# Patient Record
Sex: FEMALE | Race: WHITE | Employment: PART TIME | URBAN - METROPOLITAN AREA
[De-identification: names, ages, dates, MRNs, and addresses within clinical notes are randomized per-mention and may not be internally consistent; named-entity substitution may affect disease eponyms.]

---

## 2019-06-20 ENCOUNTER — HOSPITAL ENCOUNTER (OUTPATIENT)
Age: 39
Discharge: HOME | End: 2019-06-22
Payer: COMMERCIAL

## 2019-06-20 LAB
ALBUMIN SERPL-MCNC: 4.6 G/DL (ref 3.5–5.2)
ALP BLD-CCNC: 62 U/L (ref 35–104)
ALT SERPL-CCNC: 15 U/L (ref 0–32)
ANION GAP SERPL CALCULATED.3IONS-SCNC: 17 MMOL/L (ref 7–16)
AST SERPL-CCNC: 18 U/L (ref 0–31)
BILIRUB SERPL-MCNC: 0.3 MG/DL (ref 0–1.2)
BUN BLDV-MCNC: 8 MG/DL (ref 6–20)
CALCIUM SERPL-MCNC: 9.6 MG/DL (ref 8.6–10.2)
CHLORIDE BLD-SCNC: 102 MMOL/L (ref 98–107)
CO2: 21 MMOL/L (ref 22–29)
CREAT SERPL-MCNC: 0.8 MG/DL (ref 0.5–1)
GFR AFRICAN AMERICAN: >60
GFR NON-AFRICAN AMERICAN: >60 ML/MIN/1.73
GLUCOSE BLD-MCNC: 121 MG/DL (ref 74–99)
POTASSIUM SERPL-SCNC: 3.6 MMOL/L (ref 3.5–5)
SODIUM BLD-SCNC: 140 MMOL/L (ref 132–146)
T4 FREE: 1.29 NG/DL (ref 0.93–1.7)
TOTAL PROTEIN: 7.9 G/DL (ref 6.4–8.3)
TSH SERPL DL<=0.05 MIU/L-ACNC: 1.52 UIU/ML (ref 0.27–4.2)

## 2019-06-20 PROCEDURE — 84439 ASSAY OF FREE THYROXINE: CPT

## 2019-06-20 PROCEDURE — 80053 COMPREHEN METABOLIC PANEL: CPT

## 2019-06-20 PROCEDURE — 84443 ASSAY THYROID STIM HORMONE: CPT

## 2019-06-20 PROCEDURE — 36415 COLL VENOUS BLD VENIPUNCTURE: CPT

## 2019-07-23 ENCOUNTER — OFFICE VISIT (OUTPATIENT)
Dept: FAMILY MEDICINE CLINIC | Age: 39
End: 2019-07-23
Payer: COMMERCIAL

## 2019-07-23 VITALS
OXYGEN SATURATION: 94 % | HEIGHT: 63 IN | BODY MASS INDEX: 31.18 KG/M2 | SYSTOLIC BLOOD PRESSURE: 120 MMHG | TEMPERATURE: 97.9 F | DIASTOLIC BLOOD PRESSURE: 88 MMHG | HEART RATE: 81 BPM | WEIGHT: 176 LBS

## 2019-07-23 DIAGNOSIS — B02.29 POST HERPETIC NEURALGIA: ICD-10-CM

## 2019-07-23 DIAGNOSIS — K22.70 BARRETT'S ESOPHAGUS WITHOUT DYSPLASIA: ICD-10-CM

## 2019-07-23 DIAGNOSIS — E78.2 MIXED HYPERLIPIDEMIA: ICD-10-CM

## 2019-07-23 DIAGNOSIS — E55.9 VITAMIN D DEFICIENCY: ICD-10-CM

## 2019-07-23 DIAGNOSIS — Z79.899 LONG TERM USE OF DRUG: ICD-10-CM

## 2019-07-23 DIAGNOSIS — K22.4 ESOPHAGEAL SPASM: ICD-10-CM

## 2019-07-23 DIAGNOSIS — J30.9 CHRONIC ALLERGIC RHINITIS: ICD-10-CM

## 2019-07-23 DIAGNOSIS — F33.1 MODERATE EPISODE OF RECURRENT MAJOR DEPRESSIVE DISORDER (HCC): ICD-10-CM

## 2019-07-23 DIAGNOSIS — K52.9 COLITIS: ICD-10-CM

## 2019-07-23 DIAGNOSIS — K21.9 GASTROESOPHAGEAL REFLUX DISEASE WITHOUT ESOPHAGITIS: ICD-10-CM

## 2019-07-23 DIAGNOSIS — M15.9 PRIMARY OSTEOARTHRITIS INVOLVING MULTIPLE JOINTS: ICD-10-CM

## 2019-07-23 DIAGNOSIS — E05.90 HYPERTHYROIDISM: ICD-10-CM

## 2019-07-23 DIAGNOSIS — R73.01 IMPAIRED FASTING GLUCOSE: ICD-10-CM

## 2019-07-23 DIAGNOSIS — F41.1 GENERALIZED ANXIETY DISORDER: ICD-10-CM

## 2019-07-23 PROBLEM — M15.0 PRIMARY OSTEOARTHRITIS INVOLVING MULTIPLE JOINTS: Status: ACTIVE | Noted: 2019-07-23

## 2019-07-23 PROCEDURE — 99214 OFFICE O/P EST MOD 30 MIN: CPT | Performed by: INTERNAL MEDICINE

## 2019-07-23 RX ORDER — ATENOLOL 25 MG/1
50 TABLET ORAL 2 TIMES DAILY
Refills: 0 | COMMUNITY
Start: 2019-07-15 | End: 2021-12-13 | Stop reason: SDUPTHER

## 2019-07-23 RX ORDER — VALACYCLOVIR HYDROCHLORIDE 1 G/1
TABLET, FILM COATED ORAL
Refills: 0 | COMMUNITY
Start: 2019-06-24 | End: 2019-07-23 | Stop reason: SDUPTHER

## 2019-07-23 RX ORDER — FLUTICASONE PROPIONATE 50 MCG
2 SPRAY, SUSPENSION (ML) NASAL DAILY
Qty: 1 BOTTLE | Refills: 5 | Status: SHIPPED
Start: 2019-07-23 | End: 2020-02-25 | Stop reason: SDUPTHER

## 2019-07-23 RX ORDER — ELUXADOLINE 100 MG/1
1 TABLET, FILM COATED ORAL 2 TIMES DAILY
Refills: 0 | COMMUNITY
Start: 2019-07-01

## 2019-07-23 RX ORDER — LEVONORGESTREL/ETHINYL ESTRADIOL AND ETHINYL ESTRADIOL 100-20(84)
KIT ORAL
Refills: 0 | COMMUNITY
Start: 2019-05-04

## 2019-07-23 RX ORDER — CYCLOBENZAPRINE HCL 5 MG
TABLET ORAL
COMMUNITY
Start: 2011-12-21 | End: 2020-02-25 | Stop reason: SDUPTHER

## 2019-07-23 RX ORDER — ALPRAZOLAM 1 MG/1
1 TABLET ORAL 2 TIMES DAILY
COMMUNITY
Start: 2014-12-01 | End: 2019-07-23 | Stop reason: SDUPTHER

## 2019-07-23 RX ORDER — GABAPENTIN 600 MG/1
TABLET ORAL
Refills: 0 | COMMUNITY
Start: 2019-06-26 | End: 2019-07-23 | Stop reason: SDUPTHER

## 2019-07-23 RX ORDER — OMEPRAZOLE 40 MG/1
CAPSULE, DELAYED RELEASE ORAL
COMMUNITY
End: 2019-07-23 | Stop reason: SDUPTHER

## 2019-07-23 RX ORDER — MONTELUKAST SODIUM 10 MG/1
TABLET ORAL
Refills: 0 | COMMUNITY
Start: 2019-07-01 | End: 2019-07-23 | Stop reason: SDUPTHER

## 2019-07-23 RX ORDER — FLUTICASONE PROPIONATE 50 MCG
2 SPRAY, SUSPENSION (ML) NASAL DAILY
COMMUNITY
Start: 2019-05-16 | End: 2019-07-23 | Stop reason: SDUPTHER

## 2019-07-23 RX ORDER — DULOXETIN HYDROCHLORIDE 60 MG/1
60 CAPSULE, DELAYED RELEASE ORAL DAILY
Qty: 30 CAPSULE | Refills: 5 | Status: SHIPPED
Start: 2019-07-23 | End: 2020-02-12 | Stop reason: SDUPTHER

## 2019-07-23 RX ORDER — VALACYCLOVIR HYDROCHLORIDE 1 G/1
1000 TABLET, FILM COATED ORAL DAILY
Qty: 30 TABLET | Refills: 5 | Status: SHIPPED | OUTPATIENT
Start: 2019-07-23 | End: 2020-02-03 | Stop reason: SDUPTHER

## 2019-07-23 RX ORDER — DULOXETIN HYDROCHLORIDE 60 MG/1
CAPSULE, DELAYED RELEASE ORAL
COMMUNITY
Start: 2018-07-16 | End: 2019-07-23 | Stop reason: SDUPTHER

## 2019-07-23 RX ORDER — MONTELUKAST SODIUM 10 MG/1
10 TABLET ORAL NIGHTLY
Qty: 30 TABLET | Refills: 5 | Status: SHIPPED | OUTPATIENT
Start: 2019-07-23 | End: 2020-02-03 | Stop reason: SDUPTHER

## 2019-07-23 RX ORDER — OMEPRAZOLE 40 MG/1
40 CAPSULE, DELAYED RELEASE ORAL DAILY
Qty: 30 CAPSULE | Refills: 5 | Status: SHIPPED
Start: 2019-07-23 | End: 2020-02-25 | Stop reason: SDUPTHER

## 2019-07-23 RX ORDER — ALPRAZOLAM 1 MG/1
1 TABLET ORAL 2 TIMES DAILY
Qty: 60 TABLET | Refills: 1 | Status: SHIPPED | OUTPATIENT
Start: 2019-07-23 | End: 2019-10-21 | Stop reason: SDUPTHER

## 2019-07-23 RX ORDER — PROMETHAZINE HYDROCHLORIDE 25 MG/1
TABLET ORAL
Refills: 0 | COMMUNITY
Start: 2019-07-08 | End: 2020-07-06 | Stop reason: SDUPTHER

## 2019-07-23 RX ORDER — GABAPENTIN 600 MG/1
TABLET ORAL
Qty: 75 TABLET | Refills: 2 | Status: SHIPPED | OUTPATIENT
Start: 2019-07-23 | End: 2019-10-21 | Stop reason: SDUPTHER

## 2019-07-23 ASSESSMENT — PATIENT HEALTH QUESTIONNAIRE - PHQ9
1. LITTLE INTEREST OR PLEASURE IN DOING THINGS: 0
SUM OF ALL RESPONSES TO PHQ QUESTIONS 1-9: 0
2. FEELING DOWN, DEPRESSED OR HOPELESS: 0
SUM OF ALL RESPONSES TO PHQ QUESTIONS 1-9: 0
SUM OF ALL RESPONSES TO PHQ9 QUESTIONS 1 & 2: 0

## 2019-07-23 ASSESSMENT — ENCOUNTER SYMPTOMS
EYE PAIN: 0
CONSTIPATION: 0
BLOOD IN STOOL: 0
VOMITING: 0
ABDOMINAL PAIN: 0
SORE THROAT: 0
SHORTNESS OF BREATH: 0
DIARRHEA: 0
NAUSEA: 0
CHEST TIGHTNESS: 0
COUGH: 0
RHINORRHEA: 0

## 2019-07-23 NOTE — PROGRESS NOTES
improved with xanax and duloxetine    - States had childhood asthma. States singular has helped. States loratadine and ProAir as needed. States no wheezing or SOB. - States has seen gyn and placed on OCP for irregular menstrual cycles    Health Maintenance   - immunizations:   Influenza Vaccination  - declines  Pneumonia Vaccination  Zoster/Shingles Vaccine  Tetanus Vaccination - (2011)     - Screenings:   Bone Density Scan - (2/8/2017)  Pelvic/Pap Exam - (2017) - gyn  Mammogram     Colonoscopy - (8/2016) - polyp, hyperplastic, internal hemorrhoids, (1/2019) - tortuous colon, colon spasm, diverticulosis, No UC seen    EGD - (8/2016) - Sosa's esophagus, gastric polyps, (1/2019), Mild to mod chronic active gastritis - esophageal spasm    ROS:  Review of Systems   Constitutional: Negative for appetite change, chills, fever and unexpected weight change. HENT: Negative for congestion, rhinorrhea and sore throat. Eyes: Negative for pain and visual disturbance. Respiratory: Negative for cough, chest tightness and shortness of breath. Cardiovascular: Negative for chest pain and palpitations. Gastrointestinal: Negative for abdominal pain, blood in stool, constipation, diarrhea, nausea and vomiting. Genitourinary: Negative for difficulty urinating, dysuria, frequency, pelvic pain, urgency and vaginal bleeding. Musculoskeletal: Negative for arthralgias and myalgias. Skin: Negative for rash. Neurological: Negative for dizziness, syncope, weakness, light-headedness, numbness and headaches. Hematological: Negative for adenopathy. Psychiatric/Behavioral: Negative for suicidal ideas. The patient is not nervous/anxious.           Current Outpatient Medications:     cyclobenzaprine (FLEXERIL) 5 MG tablet, cyclobenzaprine 5 mg tablet  TAKE 1 TABLET BY MOUTH EVERY NIGHT AT BEDTIME TO TWICE DAILY AS NEEDED, Disp: , Rfl:     omeprazole (PRILOSEC) 40 MG delayed release capsule, Take 1 capsule by mouth daily, Inability: Not on file    Transportation needs:     Medical: Not on file     Non-medical: Not on file   Tobacco Use    Smoking status: Former Smoker     Packs/day: 0.50     Years: 1.50     Pack years: 0.75     Types: Cigarettes     Last attempt to quit: 1999     Years since quittin.0    Smokeless tobacco: Never Used   Substance and Sexual Activity    Alcohol use: Not on file    Drug use: Not on file    Sexual activity: Not on file   Lifestyle    Physical activity:     Days per week: Not on file     Minutes per session: Not on file    Stress: Not on file   Relationships    Social connections:     Talks on phone: Not on file     Gets together: Not on file     Attends Quaker service: Not on file     Active member of club or organization: Not on file     Attends meetings of clubs or organizations: Not on file     Relationship status: Not on file    Intimate partner violence:     Fear of current or ex partner: Not on file     Emotionally abused: Not on file     Physically abused: Not on file     Forced sexual activity: Not on file   Other Topics Concern    Not on file   Social History Narrative    Not on file       Vitals:    19 1305   BP: 120/88   Pulse: 81   Temp: 97.9 °F (36.6 °C)   SpO2: 94%   Weight: 176 lb (79.8 kg)   Height: 5' 3\" (1.6 m)       Exam:  Physical Exam   Constitutional: She is oriented to person, place, and time. She appears well-developed and well-nourished. HENT:   Head: Normocephalic and atraumatic. Right Ear: External ear normal.   Left Ear: External ear normal.   Mouth/Throat: Oropharynx is clear and moist.   Eyes: Pupils are equal, round, and reactive to light. EOM are normal.   Neck: Normal range of motion. Neck supple. No thyromegaly present. Cardiovascular: Normal rate, regular rhythm and normal heart sounds. No murmur heard. Pulmonary/Chest: Effort normal and breath sounds normal. She has no wheezes. Abdominal: Soft.  Bowel sounds are normal. She

## 2019-08-28 ENCOUNTER — HOSPITAL ENCOUNTER (OUTPATIENT)
Age: 39
Discharge: HOME | End: 2019-08-30
Payer: COMMERCIAL

## 2019-08-28 DIAGNOSIS — R73.01 IMPAIRED FASTING GLUCOSE: ICD-10-CM

## 2019-08-28 DIAGNOSIS — E78.2 MIXED HYPERLIPIDEMIA: ICD-10-CM

## 2019-08-28 DIAGNOSIS — E55.9 VITAMIN D DEFICIENCY: ICD-10-CM

## 2019-08-28 LAB
ALBUMIN SERPL-MCNC: 4.2 G/DL (ref 3.5–5.2)
ALP BLD-CCNC: 63 U/L (ref 35–104)
ALT SERPL-CCNC: 16 U/L (ref 0–32)
ANION GAP SERPL CALCULATED.3IONS-SCNC: 14 MMOL/L (ref 7–16)
AST SERPL-CCNC: 35 U/L (ref 0–31)
BACTERIA: ABNORMAL /HPF
BASOPHILS ABSOLUTE: 0.05 E9/L (ref 0–0.2)
BASOPHILS RELATIVE PERCENT: 0.4 % (ref 0–2)
BILIRUB SERPL-MCNC: 0.4 MG/DL (ref 0–1.2)
BILIRUBIN URINE: NEGATIVE
BLOOD, URINE: ABNORMAL
BUN BLDV-MCNC: 10 MG/DL (ref 6–20)
CALCIUM SERPL-MCNC: 9.2 MG/DL (ref 8.6–10.2)
CHLORIDE BLD-SCNC: 102 MMOL/L (ref 98–107)
CHOLESTEROL, FASTING: 221 MG/DL (ref 0–199)
CLARITY: CLEAR
CO2: 24 MMOL/L (ref 22–29)
COLOR: YELLOW
CREAT SERPL-MCNC: 0.8 MG/DL (ref 0.5–1)
EOSINOPHILS ABSOLUTE: 0.19 E9/L (ref 0.05–0.5)
EOSINOPHILS RELATIVE PERCENT: 1.6 % (ref 0–6)
EPITHELIAL CELLS, UA: ABNORMAL /HPF
GFR AFRICAN AMERICAN: >60
GFR NON-AFRICAN AMERICAN: >60 ML/MIN/1.73
GLUCOSE BLD-MCNC: 114 MG/DL (ref 74–99)
GLUCOSE URINE: NEGATIVE MG/DL
HBA1C MFR BLD: 5.2 % (ref 4–5.6)
HCT VFR BLD CALC: 42.4 % (ref 34–48)
HDLC SERPL-MCNC: 39 MG/DL
HEMOGLOBIN: 13.5 G/DL (ref 11.5–15.5)
IMMATURE GRANULOCYTES #: 0.05 E9/L
IMMATURE GRANULOCYTES %: 0.4 % (ref 0–5)
KETONES, URINE: NEGATIVE MG/DL
LDL CHOLESTEROL CALCULATED: 138 MG/DL (ref 0–99)
LEUKOCYTE ESTERASE, URINE: ABNORMAL
LYMPHOCYTES ABSOLUTE: 3.56 E9/L (ref 1.5–4)
LYMPHOCYTES RELATIVE PERCENT: 30.2 % (ref 20–42)
MAGNESIUM: 2.3 MG/DL (ref 1.6–2.6)
MCH RBC QN AUTO: 33.7 PG (ref 26–35)
MCHC RBC AUTO-ENTMCNC: 31.8 % (ref 32–34.5)
MCV RBC AUTO: 105.7 FL (ref 80–99.9)
MONOCYTES ABSOLUTE: 0.66 E9/L (ref 0.1–0.95)
MONOCYTES RELATIVE PERCENT: 5.6 % (ref 2–12)
NEUTROPHILS ABSOLUTE: 7.27 E9/L (ref 1.8–7.3)
NEUTROPHILS RELATIVE PERCENT: 61.8 % (ref 43–80)
NITRITE, URINE: NEGATIVE
PDW BLD-RTO: 12.2 FL (ref 11.5–15)
PH UA: 6 (ref 5–9)
PLATELET # BLD: 424 E9/L (ref 130–450)
PMV BLD AUTO: 10.5 FL (ref 7–12)
POTASSIUM SERPL-SCNC: 5 MMOL/L (ref 3.5–5)
PROTEIN UA: NEGATIVE MG/DL
RBC # BLD: 4.01 E12/L (ref 3.5–5.5)
RBC UA: ABNORMAL /HPF (ref 0–2)
SODIUM BLD-SCNC: 140 MMOL/L (ref 132–146)
SPECIFIC GRAVITY UA: <=1.005 (ref 1–1.03)
TOTAL PROTEIN: 7.5 G/DL (ref 6.4–8.3)
TRIGLYCERIDE, FASTING: 219 MG/DL (ref 0–149)
UROBILINOGEN, URINE: 0.2 E.U./DL
VITAMIN D 25-HYDROXY: 16 NG/ML (ref 30–100)
VLDLC SERPL CALC-MCNC: 44 MG/DL
WBC # BLD: 11.8 E9/L (ref 4.5–11.5)
WBC UA: ABNORMAL /HPF (ref 0–5)

## 2019-08-28 PROCEDURE — 83036 HEMOGLOBIN GLYCOSYLATED A1C: CPT

## 2019-08-28 PROCEDURE — 81001 URINALYSIS AUTO W/SCOPE: CPT

## 2019-08-28 PROCEDURE — 36415 COLL VENOUS BLD VENIPUNCTURE: CPT

## 2019-08-28 PROCEDURE — 85025 COMPLETE CBC W/AUTO DIFF WBC: CPT

## 2019-08-28 PROCEDURE — 83735 ASSAY OF MAGNESIUM: CPT

## 2019-08-28 PROCEDURE — 80061 LIPID PANEL: CPT

## 2019-08-28 PROCEDURE — 82306 VITAMIN D 25 HYDROXY: CPT

## 2019-08-28 PROCEDURE — 80053 COMPREHEN METABOLIC PANEL: CPT

## 2019-08-29 ENCOUNTER — TELEPHONE (OUTPATIENT)
Dept: PRIMARY CARE CLINIC | Age: 39
End: 2019-08-29

## 2019-08-29 NOTE — LETTER
[address] [place] [address]  Phone: [phone]  Fax: [phone]    Ashley Echevarria MD        September 3, 2019     [facility] [address]      Dear Evaristo Jacobs:    Below are the results from your recent visit:    Resulted Orders   Comprehensive Metabolic Panel   Result Value Ref Range    Sodium 140 132 - 146 mmol/L    Potassium 5.0 3.5 - 5.0 mmol/L      Comment:      Specimen is moderately Hemolyzed. Result may be artificially increased. Chloride 102 98 - 107 mmol/L    CO2 24 22 - 29 mmol/L    Anion Gap 14 7 - 16 mmol/L    Glucose 114 (H) 74 - 99 mg/dL    BUN 10 6 - 20 mg/dL    CREATININE 0.8 0.5 - 1.0 mg/dL    GFR Non-African American >60 >=60 mL/min/1.73      Comment:      Chronic Kidney Disease: less than 60 ml/min/1.73 sq.m. Kidney Failure: less than 15 ml/min/1.73 sq.m. Results valid for patients 18 years and older. GFR  >60     Calcium 9.2 8.6 - 10.2 mg/dL    Total Protein 7.5 6.4 - 8.3 g/dL    Alb 4.2 3.5 - 5.2 g/dL    Total Bilirubin 0.4 0.0 - 1.2 mg/dL    Alkaline Phosphatase 63 35 - 104 U/L    ALT 16 0 - 32 U/L    AST 35 (H) 0 - 31 U/L      Comment:      Specimen is moderately Hemolyzed. Result may be artificially increased. Lipid, Fasting   Result Value Ref Range    Cholesterol, Fasting 221 (H) 0 - 199 mg/dL    Triglyceride, Fasting 219 (H) 0 - 149 mg/dL    HDL 39 >40 mg/dL    LDL Calculated 138 (H) 0 - 99 mg/dL    VLDL Cholesterol Calculated 44 mg/dL   Magnesium   Result Value Ref Range    Magnesium 2.3 1.6 - 2.6 mg/dL   Hemoglobin A1C   Result Value Ref Range    Hemoglobin A1C 5.2 4.0 - 5.6 %   Vitamin D 25 Hydroxy   Result Value Ref Range    Vit D, 25-Hydroxy 16 (L) 30 - 100 ng/mL      Comment:      <20 ng/mL. ........... [facility] [place] Deficient  20-30 ng/mL. ......... [facility]   ng/mL. ........ [facility] [place] Sufficient  >100 ng/mL. .......... Edu[facility] [place] Toxic     Urinalysis   Result Value Ref Range    Color, UA Yellow Straw/Yellow

## 2019-09-03 DIAGNOSIS — R31.29 MICROHEMATURIA: ICD-10-CM

## 2019-09-03 DIAGNOSIS — Z79.899 LONG TERM USE OF DRUG: Primary | ICD-10-CM

## 2019-09-03 DIAGNOSIS — D75.89 MACROCYTOSIS WITHOUT ANEMIA: ICD-10-CM

## 2019-09-04 ENCOUNTER — HOSPITAL ENCOUNTER (OUTPATIENT)
Age: 39
Discharge: HOME | End: 2019-09-06
Payer: COMMERCIAL

## 2019-09-04 DIAGNOSIS — R31.29 MICROHEMATURIA: ICD-10-CM

## 2019-09-04 DIAGNOSIS — Z79.899 LONG TERM USE OF DRUG: ICD-10-CM

## 2019-09-04 DIAGNOSIS — D75.89 MACROCYTOSIS WITHOUT ANEMIA: ICD-10-CM

## 2019-09-04 LAB
BACTERIA: ABNORMAL /HPF
BILIRUBIN URINE: NEGATIVE
BLOOD, URINE: NORMAL
CLARITY: CLEAR
COLOR: YELLOW
EPITHELIAL CELLS, UA: ABNORMAL /HPF
FOLATE: 5.4 NG/ML (ref 4.8–24.2)
GLUCOSE URINE: NEGATIVE MG/DL
KETONES, URINE: NEGATIVE MG/DL
LEUKOCYTE ESTERASE, URINE: NEGATIVE
NITRITE, URINE: NEGATIVE
PH UA: 5.5 (ref 5–9)
PROTEIN UA: NEGATIVE MG/DL
RBC UA: ABNORMAL /HPF (ref 0–2)
SPECIFIC GRAVITY UA: 1.01 (ref 1–1.03)
UROBILINOGEN, URINE: 0.2 E.U./DL
VITAMIN B-12: 618 PG/ML (ref 211–946)
WBC UA: ABNORMAL /HPF (ref 0–5)

## 2019-09-04 PROCEDURE — 82607 VITAMIN B-12: CPT

## 2019-09-04 PROCEDURE — 36415 COLL VENOUS BLD VENIPUNCTURE: CPT

## 2019-09-04 PROCEDURE — 81001 URINALYSIS AUTO W/SCOPE: CPT

## 2019-09-04 PROCEDURE — 82746 ASSAY OF FOLIC ACID SERUM: CPT

## 2019-09-04 PROCEDURE — 87088 URINE BACTERIA CULTURE: CPT

## 2019-09-05 LAB
PATHOLOGIST REVIEW: NORMAL
URINE CULTURE, ROUTINE: NORMAL

## 2019-09-07 ENCOUNTER — TELEPHONE (OUTPATIENT)
Dept: FAMILY MEDICINE CLINIC | Age: 39
End: 2019-09-07

## 2019-09-09 RX ORDER — FOLIC ACID 1 MG/1
1 TABLET ORAL DAILY
COMMUNITY
End: 2019-10-21 | Stop reason: SDUPTHER

## 2019-09-10 DIAGNOSIS — N39.0 URINARY TRACT INFECTION WITHOUT HEMATURIA, SITE UNSPECIFIED: ICD-10-CM

## 2019-09-10 DIAGNOSIS — E53.8 FOLATE DEFICIENCY: ICD-10-CM

## 2019-09-10 DIAGNOSIS — E55.9 VITAMIN D DEFICIENCY: Primary | ICD-10-CM

## 2019-09-10 RX ORDER — SULFAMETHOXAZOLE AND TRIMETHOPRIM 800; 160 MG/1; MG/1
1 TABLET ORAL 2 TIMES DAILY
Qty: 10 TABLET | Refills: 0 | Status: SHIPPED | OUTPATIENT
Start: 2019-09-10 | End: 2019-09-15

## 2019-09-10 RX ORDER — FOLIC ACID 1 MG/1
1 TABLET ORAL DAILY
Qty: 30 TABLET | Refills: 5 | Status: SHIPPED
Start: 2019-09-10 | End: 2020-02-25 | Stop reason: SDUPTHER

## 2019-09-10 RX ORDER — ACETAMINOPHEN 160 MG
2000 TABLET,DISINTEGRATING ORAL DAILY
Qty: 30 CAPSULE | Refills: 5 | Status: SHIPPED | OUTPATIENT
Start: 2019-09-10 | End: 2019-12-20

## 2019-09-10 NOTE — TELEPHONE ENCOUNTER
Hannah Bonilla called back and asked if you will send a script to her pharmacy for the Vit D & Folic acid. If you write these as scripts, the insurance will cover them. Also, she is having sx of a UTI and would like you to send something in for that as well.

## 2019-09-12 RX ORDER — CYCLOBENZAPRINE HCL 5 MG
5 TABLET ORAL DAILY PRN
Qty: 30 TABLET | Refills: 5 | Status: SHIPPED | OUTPATIENT
Start: 2019-09-12 | End: 2019-10-12

## 2019-10-21 ENCOUNTER — HOSPITAL ENCOUNTER (OUTPATIENT)
Age: 39
Discharge: HOME | End: 2019-10-23
Payer: COMMERCIAL

## 2019-10-21 ENCOUNTER — OFFICE VISIT (OUTPATIENT)
Dept: FAMILY MEDICINE CLINIC | Age: 39
End: 2019-10-21
Payer: COMMERCIAL

## 2019-10-21 VITALS
TEMPERATURE: 98.1 F | SYSTOLIC BLOOD PRESSURE: 114 MMHG | OXYGEN SATURATION: 97 % | HEART RATE: 97 BPM | HEIGHT: 63 IN | DIASTOLIC BLOOD PRESSURE: 72 MMHG | WEIGHT: 185.38 LBS | BODY MASS INDEX: 32.85 KG/M2

## 2019-10-21 DIAGNOSIS — E55.9 VITAMIN D DEFICIENCY: ICD-10-CM

## 2019-10-21 DIAGNOSIS — K22.70 BARRETT'S ESOPHAGUS WITHOUT DYSPLASIA: ICD-10-CM

## 2019-10-21 DIAGNOSIS — J30.9 CHRONIC ALLERGIC RHINITIS: ICD-10-CM

## 2019-10-21 DIAGNOSIS — R73.01 IMPAIRED FASTING GLUCOSE: ICD-10-CM

## 2019-10-21 DIAGNOSIS — M15.9 PRIMARY OSTEOARTHRITIS INVOLVING MULTIPLE JOINTS: ICD-10-CM

## 2019-10-21 DIAGNOSIS — F33.1 MODERATE EPISODE OF RECURRENT MAJOR DEPRESSIVE DISORDER (HCC): ICD-10-CM

## 2019-10-21 DIAGNOSIS — K21.9 GASTROESOPHAGEAL REFLUX DISEASE WITHOUT ESOPHAGITIS: ICD-10-CM

## 2019-10-21 DIAGNOSIS — B02.29 POST HERPETIC NEURALGIA: ICD-10-CM

## 2019-10-21 DIAGNOSIS — F41.1 GENERALIZED ANXIETY DISORDER: ICD-10-CM

## 2019-10-21 DIAGNOSIS — E78.2 MIXED HYPERLIPIDEMIA: ICD-10-CM

## 2019-10-21 DIAGNOSIS — K22.4 ESOPHAGEAL SPASM: ICD-10-CM

## 2019-10-21 DIAGNOSIS — E05.90 HYPERTHYROIDISM: ICD-10-CM

## 2019-10-21 DIAGNOSIS — Z79.899 LONG TERM USE OF DRUG: Primary | ICD-10-CM

## 2019-10-21 DIAGNOSIS — K52.9 COLITIS: ICD-10-CM

## 2019-10-21 LAB
C-REACTIVE PROTEIN: 1.3 MG/DL (ref 0–0.4)
RHEUMATOID FACTOR: <10 IU/ML (ref 0–13)
URIC ACID, SERUM: 4.8 MG/DL (ref 2.4–5.7)

## 2019-10-21 PROCEDURE — 84550 ASSAY OF BLOOD/URIC ACID: CPT

## 2019-10-21 PROCEDURE — 86431 RHEUMATOID FACTOR QUANT: CPT

## 2019-10-21 PROCEDURE — G8484 FLU IMMUNIZE NO ADMIN: HCPCS | Performed by: INTERNAL MEDICINE

## 2019-10-21 PROCEDURE — 36415 COLL VENOUS BLD VENIPUNCTURE: CPT

## 2019-10-21 PROCEDURE — G8417 CALC BMI ABV UP PARAM F/U: HCPCS | Performed by: INTERNAL MEDICINE

## 2019-10-21 PROCEDURE — 86140 C-REACTIVE PROTEIN: CPT

## 2019-10-21 PROCEDURE — 1036F TOBACCO NON-USER: CPT | Performed by: INTERNAL MEDICINE

## 2019-10-21 PROCEDURE — G8427 DOCREV CUR MEDS BY ELIG CLIN: HCPCS | Performed by: INTERNAL MEDICINE

## 2019-10-21 PROCEDURE — 99214 OFFICE O/P EST MOD 30 MIN: CPT | Performed by: INTERNAL MEDICINE

## 2019-10-21 PROCEDURE — 85651 RBC SED RATE NONAUTOMATED: CPT

## 2019-10-21 PROCEDURE — 86200 CCP ANTIBODY: CPT

## 2019-10-21 RX ORDER — ALPRAZOLAM 1 MG/1
1 TABLET ORAL 2 TIMES DAILY
Qty: 60 TABLET | Refills: 2 | Status: SHIPPED | OUTPATIENT
Start: 2019-10-21 | End: 2020-09-16 | Stop reason: SDUPTHER

## 2019-10-21 RX ORDER — PERPHENAZINE AND AMITRIPTYLINE HYDROCHLORIDE 2; 10 MG/1; MG/1
1 TABLET, FILM COATED ORAL
COMMUNITY
End: 2020-09-16

## 2019-10-21 RX ORDER — GABAPENTIN 600 MG/1
TABLET ORAL
Qty: 75 TABLET | Refills: 2 | Status: SHIPPED | OUTPATIENT
Start: 2019-10-21 | End: 2020-02-25 | Stop reason: SDUPTHER

## 2019-10-21 ASSESSMENT — ENCOUNTER SYMPTOMS
VOMITING: 0
NAUSEA: 0
SHORTNESS OF BREATH: 0
CONSTIPATION: 0
RHINORRHEA: 0
DIARRHEA: 0
ABDOMINAL PAIN: 0
SORE THROAT: 0
COUGH: 0
CHEST TIGHTNESS: 0
EYE PAIN: 0
BLOOD IN STOOL: 0

## 2019-10-22 LAB — SEDIMENTATION RATE, ERYTHROCYTE: 17 MM/HR (ref 0–20)

## 2019-10-23 LAB — CCP IGG ANTIBODIES: 6 UNITS (ref 0–19)

## 2019-10-27 ENCOUNTER — TELEPHONE (OUTPATIENT)
Dept: FAMILY MEDICINE CLINIC | Age: 39
End: 2019-10-27

## 2019-11-18 RX ORDER — LORATADINE 10 MG/1
10 TABLET ORAL DAILY
Qty: 90 TABLET | Refills: 1 | Status: SHIPPED | OUTPATIENT
Start: 2019-11-18 | End: 2020-09-16 | Stop reason: SDUPTHER

## 2019-12-20 ENCOUNTER — OFFICE VISIT (OUTPATIENT)
Dept: FAMILY MEDICINE CLINIC | Age: 39
End: 2019-12-20
Payer: COMMERCIAL

## 2019-12-20 VITALS
HEIGHT: 63 IN | OXYGEN SATURATION: 98 % | TEMPERATURE: 98.3 F | DIASTOLIC BLOOD PRESSURE: 78 MMHG | BODY MASS INDEX: 33.73 KG/M2 | HEART RATE: 133 BPM | WEIGHT: 190.4 LBS | SYSTOLIC BLOOD PRESSURE: 138 MMHG

## 2019-12-20 DIAGNOSIS — K08.89 TOOTHACHE: Primary | ICD-10-CM

## 2019-12-20 PROCEDURE — 99213 OFFICE O/P EST LOW 20 MIN: CPT | Performed by: FAMILY MEDICINE

## 2019-12-20 PROCEDURE — G8484 FLU IMMUNIZE NO ADMIN: HCPCS | Performed by: FAMILY MEDICINE

## 2019-12-20 PROCEDURE — G8417 CALC BMI ABV UP PARAM F/U: HCPCS | Performed by: FAMILY MEDICINE

## 2019-12-20 PROCEDURE — G8427 DOCREV CUR MEDS BY ELIG CLIN: HCPCS | Performed by: FAMILY MEDICINE

## 2019-12-20 PROCEDURE — 1036F TOBACCO NON-USER: CPT | Performed by: FAMILY MEDICINE

## 2019-12-20 RX ORDER — AMOXICILLIN AND CLAVULANATE POTASSIUM 875; 125 MG/1; MG/1
1 TABLET, FILM COATED ORAL 2 TIMES DAILY
Qty: 14 TABLET | Refills: 0 | Status: SHIPPED | OUTPATIENT
Start: 2019-12-20 | End: 2019-12-27

## 2019-12-20 RX ORDER — AMITRIPTYLINE HYDROCHLORIDE 10 MG/1
10 TABLET, FILM COATED ORAL NIGHTLY
COMMUNITY
Start: 2019-12-12

## 2019-12-20 RX ORDER — CHOLECALCIFEROL (VITAMIN D3) 50 MCG
TABLET ORAL
Refills: 0 | COMMUNITY
Start: 2019-11-12 | End: 2020-09-16

## 2019-12-20 RX ORDER — HYDROCODONE BITARTRATE AND ACETAMINOPHEN 5; 325 MG/1; MG/1
1 TABLET ORAL EVERY 6 HOURS PRN
Qty: 12 TABLET | Refills: 0 | Status: SHIPPED | OUTPATIENT
Start: 2019-12-20 | End: 2019-12-23

## 2019-12-20 RX ORDER — ALBUTEROL SULFATE 90 UG/1
AEROSOL, METERED RESPIRATORY (INHALATION)
COMMUNITY
Start: 2019-12-10 | End: 2020-02-25 | Stop reason: SDUPTHER

## 2019-12-20 RX ORDER — PERPHENAZINE 2 MG/1
2 TABLET, FILM COATED ORAL DAILY
COMMUNITY
Start: 2019-12-12

## 2019-12-20 RX ORDER — 1.1% SODIUM FLUORIDE PRESCRIPTION DENTAL CREAM 5 MG/G
CREAM DENTAL
Refills: 0 | COMMUNITY
Start: 2019-10-19 | End: 2021-03-23 | Stop reason: ALTCHOICE

## 2020-01-14 ENCOUNTER — HOSPITAL ENCOUNTER (OUTPATIENT)
Age: 40
Discharge: HOME | End: 2020-01-16
Payer: COMMERCIAL

## 2020-01-14 LAB
ALBUMIN SERPL-MCNC: 4.3 G/DL (ref 3.5–5.2)
ALP BLD-CCNC: 62 U/L (ref 35–104)
ALT SERPL-CCNC: 13 U/L (ref 0–32)
ANION GAP SERPL CALCULATED.3IONS-SCNC: 19 MMOL/L (ref 7–16)
AST SERPL-CCNC: 17 U/L (ref 0–31)
BILIRUB SERPL-MCNC: 0.2 MG/DL (ref 0–1.2)
BUN BLDV-MCNC: 11 MG/DL (ref 6–20)
CALCIUM SERPL-MCNC: 8.9 MG/DL (ref 8.6–10.2)
CHLORIDE BLD-SCNC: 102 MMOL/L (ref 98–107)
CO2: 18 MMOL/L (ref 22–29)
CREAT SERPL-MCNC: 0.9 MG/DL (ref 0.5–1)
GFR AFRICAN AMERICAN: >60
GFR NON-AFRICAN AMERICAN: >60 ML/MIN/1.73
GLUCOSE BLD-MCNC: 163 MG/DL (ref 74–99)
POTASSIUM SERPL-SCNC: 3.8 MMOL/L (ref 3.5–5)
SODIUM BLD-SCNC: 139 MMOL/L (ref 132–146)
TOTAL PROTEIN: 7.3 G/DL (ref 6.4–8.3)
TSH SERPL DL<=0.05 MIU/L-ACNC: 2 UIU/ML (ref 0.27–4.2)

## 2020-01-14 PROCEDURE — 80053 COMPREHEN METABOLIC PANEL: CPT

## 2020-01-14 PROCEDURE — 36415 COLL VENOUS BLD VENIPUNCTURE: CPT

## 2020-01-14 PROCEDURE — 84443 ASSAY THYROID STIM HORMONE: CPT

## 2020-01-14 PROCEDURE — 84439 ASSAY OF FREE THYROXINE: CPT

## 2020-01-15 LAB — T4 FREE: 1.02 NG/DL (ref 0.93–1.7)

## 2020-02-03 RX ORDER — MONTELUKAST SODIUM 10 MG/1
10 TABLET ORAL NIGHTLY
Qty: 30 TABLET | Refills: 0 | Status: SHIPPED
Start: 2020-02-03 | End: 2020-02-25 | Stop reason: SDUPTHER

## 2020-02-03 RX ORDER — VALACYCLOVIR HYDROCHLORIDE 1 G/1
1000 TABLET, FILM COATED ORAL DAILY
Qty: 30 TABLET | Refills: 0 | Status: SHIPPED
Start: 2020-02-03 | End: 2020-02-25 | Stop reason: SDUPTHER

## 2020-02-03 NOTE — TELEPHONE ENCOUNTER
Name of Medication(s) Requested:  Singulair & Valtrex    Pharmacy Requested:   [facility]      Medication(s) pended? [x] Yes  [] No    Last Appointment:  10/21/2019    Future appts:  Future Appointments   Date Time Provider Jeanette Gia   2/25/2020  3:45 PM Akira Nation  [address]        Does patient need call back?   [] Yes  [x] No

## 2020-02-12 RX ORDER — DULOXETIN HYDROCHLORIDE 60 MG/1
60 CAPSULE, DELAYED RELEASE ORAL DAILY
Qty: 30 CAPSULE | Refills: 0 | Status: SHIPPED
Start: 2020-02-12 | End: 2020-02-25 | Stop reason: SDUPTHER

## 2020-02-25 ENCOUNTER — OFFICE VISIT (OUTPATIENT)
Dept: FAMILY MEDICINE CLINIC | Age: 40
End: 2020-02-25
Payer: COMMERCIAL

## 2020-02-25 VITALS
WEIGHT: 194 LBS | SYSTOLIC BLOOD PRESSURE: 116 MMHG | OXYGEN SATURATION: 98 % | BODY MASS INDEX: 34.37 KG/M2 | TEMPERATURE: 98.6 F | HEART RATE: 109 BPM | DIASTOLIC BLOOD PRESSURE: 76 MMHG

## 2020-02-25 PROBLEM — K60.2 ANAL FISSURE: Status: ACTIVE | Noted: 2020-02-25

## 2020-02-25 PROCEDURE — G8427 DOCREV CUR MEDS BY ELIG CLIN: HCPCS | Performed by: INTERNAL MEDICINE

## 2020-02-25 PROCEDURE — 96160 PT-FOCUSED HLTH RISK ASSMT: CPT | Performed by: INTERNAL MEDICINE

## 2020-02-25 PROCEDURE — G8484 FLU IMMUNIZE NO ADMIN: HCPCS | Performed by: INTERNAL MEDICINE

## 2020-02-25 PROCEDURE — G8417 CALC BMI ABV UP PARAM F/U: HCPCS | Performed by: INTERNAL MEDICINE

## 2020-02-25 PROCEDURE — 1036F TOBACCO NON-USER: CPT | Performed by: INTERNAL MEDICINE

## 2020-02-25 PROCEDURE — 99214 OFFICE O/P EST MOD 30 MIN: CPT | Performed by: INTERNAL MEDICINE

## 2020-02-25 RX ORDER — DULOXETIN HYDROCHLORIDE 60 MG/1
60 CAPSULE, DELAYED RELEASE ORAL DAILY
Qty: 30 CAPSULE | Refills: 2 | Status: SHIPPED
Start: 2020-02-25 | End: 2020-05-01 | Stop reason: SDUPTHER

## 2020-02-25 RX ORDER — MONTELUKAST SODIUM 10 MG/1
10 TABLET ORAL NIGHTLY
Qty: 30 TABLET | Refills: 2 | Status: SHIPPED
Start: 2020-02-25 | End: 2020-06-02 | Stop reason: SDUPTHER

## 2020-02-25 RX ORDER — FOLIC ACID 1 MG/1
1 TABLET ORAL DAILY
Qty: 30 TABLET | Refills: 5 | Status: SHIPPED
Start: 2020-02-25 | End: 2020-09-16

## 2020-02-25 RX ORDER — VALACYCLOVIR HYDROCHLORIDE 1 G/1
1000 TABLET, FILM COATED ORAL DAILY
Qty: 30 TABLET | Refills: 2 | Status: SHIPPED
Start: 2020-02-25 | End: 2020-05-01 | Stop reason: SDUPTHER

## 2020-02-25 RX ORDER — FLUTICASONE PROPIONATE 50 MCG
2 SPRAY, SUSPENSION (ML) NASAL DAILY
Qty: 1 BOTTLE | Refills: 5 | Status: SHIPPED
Start: 2020-02-25 | End: 2020-09-16 | Stop reason: SDUPTHER

## 2020-02-25 RX ORDER — ALBUTEROL SULFATE 90 UG/1
2 AEROSOL, METERED RESPIRATORY (INHALATION) EVERY 4 HOURS PRN
Qty: 1 INHALER | Refills: 3 | Status: SHIPPED
Start: 2020-02-25 | End: 2021-03-23 | Stop reason: SDUPTHER

## 2020-02-25 RX ORDER — OMEPRAZOLE 40 MG/1
40 CAPSULE, DELAYED RELEASE ORAL DAILY
Qty: 30 CAPSULE | Refills: 5 | Status: SHIPPED
Start: 2020-02-25 | End: 2020-08-31 | Stop reason: SDUPTHER

## 2020-02-25 RX ORDER — GABAPENTIN 600 MG/1
TABLET ORAL
Qty: 75 TABLET | Refills: 2 | Status: SHIPPED
Start: 2020-02-25 | End: 2020-05-01 | Stop reason: SDUPTHER

## 2020-02-25 RX ORDER — CYCLOBENZAPRINE HCL 5 MG
5 TABLET ORAL 3 TIMES DAILY PRN
Qty: 60 TABLET | Refills: 2 | Status: SHIPPED
Start: 2020-02-25 | End: 2020-07-15 | Stop reason: SDUPTHER

## 2020-02-25 RX ORDER — ALPRAZOLAM 1 MG/1
1 TABLET ORAL NIGHTLY PRN
Qty: 60 TABLET | Refills: 2 | Status: SHIPPED | OUTPATIENT
Start: 2020-02-25 | End: 2020-05-01

## 2020-02-25 ASSESSMENT — ENCOUNTER SYMPTOMS
VOMITING: 0
EYE PAIN: 0
DIARRHEA: 0
BLOOD IN STOOL: 0
NAUSEA: 0
RHINORRHEA: 0
CHEST TIGHTNESS: 0
SORE THROAT: 0
COUGH: 0
ABDOMINAL PAIN: 0
CONSTIPATION: 0
SHORTNESS OF BREATH: 0

## 2020-02-25 ASSESSMENT — PATIENT HEALTH QUESTIONNAIRE - PHQ9
3. TROUBLE FALLING OR STAYING ASLEEP: 0
5. POOR APPETITE OR OVEREATING: 0
1. LITTLE INTEREST OR PLEASURE IN DOING THINGS: 0
SUM OF ALL RESPONSES TO PHQ9 QUESTIONS 1 & 2: 0
8. MOVING OR SPEAKING SO SLOWLY THAT OTHER PEOPLE COULD HAVE NOTICED. OR THE OPPOSITE, BEING SO FIGETY OR RESTLESS THAT YOU HAVE BEEN MOVING AROUND A LOT MORE THAN USUAL: 0
SUM OF ALL RESPONSES TO PHQ QUESTIONS 1-9: 0
6. FEELING BAD ABOUT YOURSELF - OR THAT YOU ARE A FAILURE OR HAVE LET YOURSELF OR YOUR FAMILY DOWN: 0
7. TROUBLE CONCENTRATING ON THINGS, SUCH AS READING THE NEWSPAPER OR WATCHING TELEVISION: 0
10. IF YOU CHECKED OFF ANY PROBLEMS, HOW DIFFICULT HAVE THESE PROBLEMS MADE IT FOR YOU TO DO YOUR WORK, TAKE CARE OF THINGS AT HOME, OR GET ALONG WITH OTHER PEOPLE: 0
2. FEELING DOWN, DEPRESSED OR HOPELESS: 0
4. FEELING TIRED OR HAVING LITTLE ENERGY: 0
9. THOUGHTS THAT YOU WOULD BE BETTER OFF DEAD, OR OF HURTING YOURSELF: 0
SUM OF ALL RESPONSES TO PHQ QUESTIONS 1-9: 0

## 2020-02-25 NOTE — PROGRESS NOTES
[facility]) Physicians   Internal Medicine     2020  Evi Loco : 1980 Sex: female  Age:39 y.o. Chief Complaint   Patient presents with    Gastroesophageal Reflux     Sosa's    Hypothyroidism    Hyperlipidemia    Rectal Bleeding     w/ BM. Was seen at [facility] was told that she has anal fissuers. Rx for Nifedipine 0.2% ointment given.  Anxiety     PHQ9 and Med Contract signed today         HPI:   Patient presents to office for review and management of chronic medical conditions.    - States was seen at [facility]. States through colorectal surgery. States had anal fissure. States placed on compounded ointment - nifedipine 2-3 times per day. States still has pain. Bleeding is less. Using topical lidocaine. States also taking fiber.      - States GERD, states on omeprazole - had EGD and colonoscopy - Sosa esophagus on (2016), follow up 2019 - no Sosa - states esophageal spasm and gastritis. Now on perphen-amitriptyline. Stopped phenergan and isosorbide as needed. States suggested haldol or Marinol, - Marinol not covered. Has side effects with haldol, now off. To follow up. - States having severe IBS with diarrhea, possible ulcerative colitis. Stopped Lialda. No abdo pain. No current bowel urgency, bleeding. States bloating and gas. No fever or chills. States follows with GI. States on Viberzi twice a day- states has decreased to once a day. Last colonoscopy was 2019    - States anxiety is more controlled. On duloxetine and on xanax. States taking xanax daily and sometimes twice a day. States also helps with sleeping. Did not go to counseling. No Suicidal thoughts. No reported side effects with medications. States meds are helping symptoms.    - States shingles has become recurrent. Stable. States saw infectious disease and placed on valtrex daily for suppressive treatment. States taking gabapentin 600mg twice a day for post herpetic neuralgia.  Not currently the next 50mg (then repeat), Disp: , Rfl: 0    CAMRESE LO 0.1-0.02 & 0.01 MG TABS, , Disp: , Rfl: 0    promethazine (PHENERGAN) 25 MG tablet, 1-2 tabs q 4 hrs prn, Disp: , Rfl: 0    VIBERZI 100 MG TABS, 1 tablet 2 times daily. , Disp: , Rfl: 0    ALPRAZolam (XANAX) 1 MG tablet, Take 1 tablet by mouth 2 times daily for 90 days. , Disp: 60 tablet, Rfl: 2    perphenazine-amitriptyline (ETRAFON;TRIAVIL) 2-10 MG per tablet, Take 1 tablet by mouth 1=2 tabltes by mouth once a day   Green[place] Brady, Disp: , Rfl:     Allergies   Allergen Reactions    Latex     Ibuprofen Other (See Comments)     NSAIDs trigger colitis        Past Medical History:   Diagnosis Date    Asthma     Sosa's esophagus without dysplasia 7/23/2019    Chronic allergic rhinitis 7/23/2019    Colitis 7/23/2019    Depression     Esophageal spasm 7/23/2019    Gastroesophageal reflux disease without esophagitis 7/23/2019    Generalized anxiety disorder 7/23/2019    Hyperthyroidism 7/23/2019    Impaired fasting glucose 7/23/2019    Insomnia     Irritable bowel syndrome     Seasonal allergies     Ulcerative colitis ([facility] [address]        Past Surgical History:   Procedure Laterality Date    NASAL SEPTUM SURGERY      biopsy     WISDOM TOOTH EXTRACTION         Family History   Problem Relation Age of Onset    Osteoarthritis Mother     Elevated Lipids Mother     Heart Disease Father     Other Father         back pain     Elevated Lipids Father        Social History     Socioeconomic History    Marital status: Unknown     Spouse name: Not on file    Number of children: Not on file    Years of education: Not on file    Highest education level: Not on file   Occupational History    Not on file   Social Needs    Financial resource strain: Not on file    Food insecurity:     Worry: Not on file     Inability: Not on file    Transportation needs:     Medical: Not on file     Non-medical: Not on file   Tobacco Use    Smoking status: Former tab 2-3 times qd    montelukast (SINGULAIR) 10 MG tablet 30 tablet 2     Sig: Take 1 tablet by mouth nightly    omeprazole (PRILOSEC) 40 MG delayed release capsule 30 capsule 5     Sig: Take 1 capsule by mouth daily    valACYclovir (VALTREX) 1 g tablet 30 tablet 2     Sig: Take 1 tablet by mouth daily    albuterol sulfate  (90 Base) MCG/ACT inhaler 1 Inhaler 3     Sig: Inhale 2 puffs into the lungs every 4 hours as needed for Wheezing    cyclobenzaprine (FLEXERIL) 5 MG tablet 60 tablet 2     Sig: Take 1 tablet by mouth 3 times daily as needed for Muscle spasms    ALPRAZolam (XANAX) 1 MG tablet 60 tablet 2     Sig: Take 1 tablet by mouth nightly as needed for Anxiety for up to 30 days.        Solitario Stockton MD  2/25/2020  5:17 PM

## 2020-04-29 ENCOUNTER — PATIENT MESSAGE (OUTPATIENT)
Dept: FAMILY MEDICINE CLINIC | Age: 40
End: 2020-04-29

## 2020-05-01 ENCOUNTER — TELEMEDICINE (OUTPATIENT)
Dept: PRIMARY CARE CLINIC | Age: 40
End: 2020-05-01
Payer: COMMERCIAL

## 2020-05-01 PROCEDURE — 1036F TOBACCO NON-USER: CPT | Performed by: INTERNAL MEDICINE

## 2020-05-01 PROCEDURE — G8417 CALC BMI ABV UP PARAM F/U: HCPCS | Performed by: INTERNAL MEDICINE

## 2020-05-01 PROCEDURE — G8427 DOCREV CUR MEDS BY ELIG CLIN: HCPCS | Performed by: INTERNAL MEDICINE

## 2020-05-01 PROCEDURE — 99214 OFFICE O/P EST MOD 30 MIN: CPT | Performed by: INTERNAL MEDICINE

## 2020-05-01 RX ORDER — VALACYCLOVIR HYDROCHLORIDE 1 G/1
1000 TABLET, FILM COATED ORAL DAILY
Qty: 30 TABLET | Refills: 2 | Status: SHIPPED
Start: 2020-05-01 | End: 2020-09-14 | Stop reason: SDUPTHER

## 2020-05-01 RX ORDER — VALACYCLOVIR HYDROCHLORIDE 1 G/1
1000 TABLET, FILM COATED ORAL 3 TIMES DAILY
Qty: 21 TABLET | Refills: 0 | Status: SHIPPED | OUTPATIENT
Start: 2020-05-01 | End: 2020-05-08

## 2020-05-01 RX ORDER — GABAPENTIN 600 MG/1
TABLET ORAL
Qty: 75 TABLET | Refills: 2 | Status: SHIPPED
Start: 2020-05-01 | End: 2020-08-24 | Stop reason: SDUPTHER

## 2020-05-01 RX ORDER — DULOXETIN HYDROCHLORIDE 60 MG/1
60 CAPSULE, DELAYED RELEASE ORAL DAILY
Qty: 30 CAPSULE | Refills: 2 | Status: SHIPPED
Start: 2020-05-01 | End: 2020-09-14 | Stop reason: SDUPTHER

## 2020-05-01 ASSESSMENT — ENCOUNTER SYMPTOMS
ABDOMINAL PAIN: 0
CHEST TIGHTNESS: 0
COUGH: 0
EYE PAIN: 0
SHORTNESS OF BREATH: 0
VOMITING: 0
NAUSEA: 0
CONSTIPATION: 0
BLOOD IN STOOL: 0
DIARRHEA: 0
SORE THROAT: 0
RHINORRHEA: 0

## 2020-05-01 NOTE — PROGRESS NOTES
unexpected weight change. HENT: Negative for congestion, rhinorrhea and sore throat. Eyes: Negative for pain and visual disturbance. Respiratory: Negative for cough, chest tightness and shortness of breath. Cardiovascular: Negative for chest pain and palpitations. Gastrointestinal: Negative for abdominal pain, blood in stool, constipation, diarrhea, nausea and vomiting. Genitourinary: Negative for difficulty urinating, dysuria, frequency, pelvic pain, urgency and vaginal bleeding. Musculoskeletal: Negative for arthralgias and myalgias. Skin: Positive for rash. Neurological: Negative for dizziness, syncope, weakness, light-headedness, numbness and headaches. Hematological: Negative for adenopathy. Psychiatric/Behavioral: Negative for suicidal ideas. The patient is nervous/anxious. Prior to Visit Medications    Medication Sig Taking?  Authorizing Provider   gabapentin (NEURONTIN) 600 MG tablet 1 tab 2-3 times qd Yes Terri Craig MD   valACYclovir (VALTREX) 1 g tablet Take 1 tablet by mouth daily Yes Terri Craig MD   DULoxetine (CYMBALTA) 60 MG extended release capsule Take 1 capsule by mouth daily Yes Terri Craig MD   valACYclovir (VALTREX) 1 g tablet Take 1 tablet by mouth 3 times daily for 7 days Yes Terri Craig MD   NONFORMULARY Apply topically Nifedipine 0.2% ointment 2-3 times per day to annual fissure Yes Historical Provider, MD   fluticasone (FLONASE) 50 MCG/ACT nasal spray 2 sprays by Nasal route daily Yes Terri Craig MD   folic acid (FOLVITE) 1 MG tablet Take 1 tablet by mouth daily Yes Terri Craig MD   montelukast (SINGULAIR) 10 MG tablet Take 1 tablet by mouth nightly Yes Terri Craig MD   omeprazole (PRILOSEC) 40 MG delayed release capsule Take 1 capsule by mouth daily Yes Terri Craig MD   albuterol sulfate  (90 Base) MCG/ACT inhaler Inhale 2 puffs into the lungs every 4 hours as needed for Wheezing Yes Terri Craig MD Gastroesophageal reflux disease without esophagitis 2019    Generalized anxiety disorder 2019    Hyperthyroidism 2019    Impaired fasting glucose 2019    Insomnia     Irritable bowel syndrome     Seasonal allergies     Ulcerative colitis (HCC)    ,   Past Surgical History:   Procedure Laterality Date    NASAL SEPTUM SURGERY      biopsy     WISDOM TOOTH EXTRACTION     ,   Social History     Tobacco Use    Smoking status: Former Smoker     Packs/day: 0.50     Years: 1.50     Pack years: 0.75     Types: Cigarettes     Last attempt to quit: 1999     Years since quittin.7    Smokeless tobacco: Never Used   Substance Use Topics    Alcohol use: Yes     Comment: socially     Drug use: Not on file   ,   Immunization History   Administered Date(s) Administered    Tdap (Boostrix, Adacel) 10/03/2011   ,   Health Maintenance   Topic Date Due    HIV screen  1993    Cervical cancer screen  2013    A1C test (Diabetic or Prediabetic)  2020    Flu vaccine (Season Ended) 2020    TSH testing  2021    DTaP/Tdap/Td vaccine (2 - Td) 10/03/2021    Lipid screen  2024    Hepatitis A vaccine  Aged Out    Hepatitis B vaccine  Aged Out    Hib vaccine  Aged Out    Meningococcal (ACWY) vaccine  Aged Out    Pneumococcal 0-64 years Vaccine  Aged Out       PHYSICAL EXAMINATION:  [ INSTRUCTIONS:  \"[x]\" Indicates a positive item  \"[]\" Indicates a negative item  -- DELETE ALL ITEMS NOT EXAMINED]  Vital Signs: (As obtained by patient/caregiver or practitioner observation)    Blood pressure-  Heart rate-    Respiratory rate-    Temperature-  Pulse oximetry-     Constitutional: [x] Appears well-developed and well-nourished [] No apparent distress      [] Abnormal-   Mental status  [x] Alert and awake  [x] Oriented to person/place/time []Able to follow commands      Eyes:  EOM    [x]  Normal  [] Abnormal-  Sclera  []  Normal  [] Abnormal -         Discharge []  None treatment  - watch diet  - on omeprazole   - discussed long term use side effects  - EGD 2019, esophageal spasm and gastritis, no gayle  - recommended haldol by GI - did not tolerate side effects   - on perphen-amitriptyline  - stable      Colitis  - continue present treatment  - following up with GI  - uncertain etiology   - stopped lialda  - now stable  - last colonoscopy       Impaired fasting glucose  hyperglycemia  - Follow A1c      Mixed hyperlipidemia  - continue present treatment  - watch diet     Hyperthyroidism  - following with endocrinology  - off methimazole  - on atenolol on alternating doses  - last lab (2020) - normal.     Esophageal spasm  - EGD () - esophageal spasm and gastritis, no gayle  - recommended haldol by GI - did not tolerate   - concern with antipsychotic, long term use adverse reactions and other drug interactions and uncertain indication. - on perphen-amitriptyline  - unclear if calcium channel blocker is contraindicated  - Marinol not covered     Chronic allergic rhinitis  - continue present treatment  - on singulair     Vitamin D deficiency  - continue present treatment  - on vitamin D3 2000units daily   - follow labs      Primary osteoarthritis involving multiple joints  - uncertain etiology  - improved on duloxetine  - Stable      Long term use of drug    Return in about 3 months (around 2020) for check up and review. No orders of the defined types were placed in this encounter. Requested Prescriptions     Signed Prescriptions Disp Refills    gabapentin (NEURONTIN) 600 MG tablet 75 tablet 2     Si tab 2-3 times qd    valACYclovir (VALTREX) 1 g tablet 30 tablet 2     Sig: Take 1 tablet by mouth daily    DULoxetine (CYMBALTA) 60 MG extended release capsule 30 capsule 2     Sig: Take 1 capsule by mouth daily    valACYclovir (VALTREX) 1 g tablet 21 tablet 0     Sig: Take 1 tablet by mouth 3 times daily for 7 days       Cris Hoffman is a 37 y.o.

## 2020-06-02 RX ORDER — MONTELUKAST SODIUM 10 MG/1
10 TABLET ORAL NIGHTLY
Qty: 30 TABLET | Refills: 2 | Status: SHIPPED
Start: 2020-06-02 | End: 2020-08-31 | Stop reason: SDUPTHER

## 2020-07-06 RX ORDER — PROMETHAZINE HYDROCHLORIDE 25 MG/1
25 TABLET ORAL EVERY 8 HOURS PRN
Qty: 40 TABLET | Refills: 0 | Status: SHIPPED
Start: 2020-07-06 | End: 2020-09-16 | Stop reason: SDUPTHER

## 2020-07-15 RX ORDER — CYCLOBENZAPRINE HCL 5 MG
5 TABLET ORAL 3 TIMES DAILY PRN
Qty: 60 TABLET | Refills: 2 | Status: SHIPPED
Start: 2020-07-15 | End: 2020-12-14 | Stop reason: SDUPTHER

## 2020-08-24 RX ORDER — GABAPENTIN 600 MG/1
TABLET ORAL
Qty: 75 TABLET | Refills: 2 | Status: SHIPPED
Start: 2020-08-24 | End: 2020-11-04 | Stop reason: SDUPTHER

## 2020-08-28 ENCOUNTER — TELEPHONE (OUTPATIENT)
Dept: FAMILY MEDICINE CLINIC | Age: 40
End: 2020-08-28

## 2020-08-31 RX ORDER — MONTELUKAST SODIUM 10 MG/1
10 TABLET ORAL NIGHTLY
Qty: 30 TABLET | Refills: 2 | Status: SHIPPED
Start: 2020-08-31 | End: 2020-11-30 | Stop reason: SDUPTHER

## 2020-08-31 RX ORDER — OMEPRAZOLE 40 MG/1
40 CAPSULE, DELAYED RELEASE ORAL DAILY
Qty: 30 CAPSULE | Refills: 5 | Status: SHIPPED
Start: 2020-08-31 | End: 2021-03-01 | Stop reason: SDUPTHER

## 2020-09-14 RX ORDER — VALACYCLOVIR HYDROCHLORIDE 1 G/1
1000 TABLET, FILM COATED ORAL DAILY
Qty: 30 TABLET | Refills: 2 | Status: SHIPPED
Start: 2020-09-14 | End: 2020-12-07 | Stop reason: SDUPTHER

## 2020-09-14 RX ORDER — DULOXETIN HYDROCHLORIDE 60 MG/1
60 CAPSULE, DELAYED RELEASE ORAL DAILY
Qty: 30 CAPSULE | Refills: 2 | Status: SHIPPED
Start: 2020-09-14 | End: 2020-12-07 | Stop reason: SDUPTHER

## 2020-09-16 ENCOUNTER — OFFICE VISIT (OUTPATIENT)
Dept: FAMILY MEDICINE CLINIC | Age: 40
End: 2020-09-16
Payer: COMMERCIAL

## 2020-09-16 ENCOUNTER — HOSPITAL ENCOUNTER (OUTPATIENT)
Age: 40
Discharge: HOME | End: 2020-09-18
Payer: COMMERCIAL

## 2020-09-16 VITALS
DIASTOLIC BLOOD PRESSURE: 82 MMHG | SYSTOLIC BLOOD PRESSURE: 116 MMHG | TEMPERATURE: 97.8 F | WEIGHT: 205 LBS | HEIGHT: 63 IN | HEART RATE: 89 BPM | BODY MASS INDEX: 36.32 KG/M2 | OXYGEN SATURATION: 98 %

## 2020-09-16 LAB
ALBUMIN SERPL-MCNC: 4 G/DL (ref 3.5–5.2)
ALP BLD-CCNC: 62 U/L (ref 35–104)
ALT SERPL-CCNC: 13 U/L (ref 0–32)
ANION GAP SERPL CALCULATED.3IONS-SCNC: 13 MMOL/L (ref 7–16)
AST SERPL-CCNC: 17 U/L (ref 0–31)
BILIRUB SERPL-MCNC: 0.3 MG/DL (ref 0–1.2)
BUN BLDV-MCNC: 10 MG/DL (ref 6–20)
CALCIUM SERPL-MCNC: 8.9 MG/DL (ref 8.6–10.2)
CHLORIDE BLD-SCNC: 102 MMOL/L (ref 98–107)
CO2: 21 MMOL/L (ref 22–29)
CREAT SERPL-MCNC: 0.8 MG/DL (ref 0.5–1)
GFR AFRICAN AMERICAN: >60
GFR NON-AFRICAN AMERICAN: >60 ML/MIN/1.73
GLUCOSE BLD-MCNC: 118 MG/DL (ref 74–99)
HBA1C MFR BLD: 5.5 % (ref 4–5.6)
POTASSIUM SERPL-SCNC: 4.2 MMOL/L (ref 3.5–5)
SODIUM BLD-SCNC: 136 MMOL/L (ref 132–146)
T4 FREE: 1.15 NG/DL (ref 0.93–1.7)
TOTAL PROTEIN: 7.2 G/DL (ref 6.4–8.3)
TSH SERPL DL<=0.05 MIU/L-ACNC: 1.44 UIU/ML (ref 0.27–4.2)

## 2020-09-16 PROCEDURE — 84439 ASSAY OF FREE THYROXINE: CPT

## 2020-09-16 PROCEDURE — 84443 ASSAY THYROID STIM HORMONE: CPT

## 2020-09-16 PROCEDURE — 36415 COLL VENOUS BLD VENIPUNCTURE: CPT

## 2020-09-16 PROCEDURE — 83036 HEMOGLOBIN GLYCOSYLATED A1C: CPT

## 2020-09-16 PROCEDURE — 80053 COMPREHEN METABOLIC PANEL: CPT

## 2020-09-16 PROCEDURE — G8417 CALC BMI ABV UP PARAM F/U: HCPCS | Performed by: INTERNAL MEDICINE

## 2020-09-16 PROCEDURE — 1036F TOBACCO NON-USER: CPT | Performed by: INTERNAL MEDICINE

## 2020-09-16 PROCEDURE — 99214 OFFICE O/P EST MOD 30 MIN: CPT | Performed by: INTERNAL MEDICINE

## 2020-09-16 PROCEDURE — G8427 DOCREV CUR MEDS BY ELIG CLIN: HCPCS | Performed by: INTERNAL MEDICINE

## 2020-09-16 RX ORDER — PROMETHAZINE HYDROCHLORIDE 25 MG/1
25 TABLET ORAL EVERY 8 HOURS PRN
Qty: 40 TABLET | Refills: 1 | Status: SHIPPED
Start: 2020-09-16 | End: 2021-03-23 | Stop reason: SDUPTHER

## 2020-09-16 RX ORDER — FLUTICASONE PROPIONATE 50 MCG
2 SPRAY, SUSPENSION (ML) NASAL DAILY
Qty: 1 BOTTLE | Refills: 5 | Status: SHIPPED
Start: 2020-09-16 | End: 2021-03-23 | Stop reason: SDUPTHER

## 2020-09-16 RX ORDER — LORATADINE 10 MG/1
10 TABLET ORAL DAILY PRN
Qty: 30 TABLET | Refills: 2 | Status: SHIPPED
Start: 2020-09-16 | End: 2021-03-23 | Stop reason: SDUPTHER

## 2020-09-16 RX ORDER — ALPRAZOLAM 1 MG/1
1 TABLET ORAL 2 TIMES DAILY
Qty: 60 TABLET | Refills: 2 | Status: SHIPPED | OUTPATIENT
Start: 2020-09-16 | End: 2020-12-14 | Stop reason: SDUPTHER

## 2020-09-16 ASSESSMENT — ENCOUNTER SYMPTOMS
CHEST TIGHTNESS: 0
BLOOD IN STOOL: 0
DIARRHEA: 0
COUGH: 0
SHORTNESS OF BREATH: 0
SORE THROAT: 0
VOMITING: 0
RHINORRHEA: 0
ABDOMINAL PAIN: 0
CONSTIPATION: 0
EYE PAIN: 0
NAUSEA: 0

## 2020-09-16 NOTE — PROGRESS NOTES
following with ID.     - States allergy and sinus symptoms have improved with Flonase. Stable. States taking loratadine as needed. On Singulair    - States back pain is stable. States comes and goes. States gabapentin has helped. - States now seeing endocrinology for graves disease. States methimazole stopped. State on atenolol 50mg alternating with 25mg. Last TSH per patient was 2.0    - States insomnia has improved with xanax and duloxetine    - States had childhood asthma. States singular has helped. States loratadine and ProAir as needed. States no wheezing or SOB. - States has seen gyn and placed on OCP for irregular menstrual cycles    Health Maintenance   - immunizations:   Influenza Vaccination  - declines  Pneumonia Vaccination  Zoster/Shingles Vaccine  Tetanus Vaccination - (2011)     - Screenings:   Bone Density Scan - (2/8/2017)  Pelvic/Pap Exam - (2017), (2019) - gyn  Mammogram     Colonoscopy - (8/2016) - polyp, hyperplastic, internal hemorrhoids, (1/2019) - tortuous colon, colon spasm, diverticulosis, No UC seen    EGD - (8/2016) - Sosa's esophagus, gastric polyps, (1/2019), Mild to mod chronic active gastritis - esophageal spasm    ROS:  Review of Systems   Constitutional: Negative for appetite change, chills, fever and unexpected weight change. HENT: Negative for congestion, rhinorrhea and sore throat. Eyes: Negative for pain and visual disturbance. Respiratory: Negative for cough, chest tightness and shortness of breath. Cardiovascular: Negative for chest pain and palpitations. Gastrointestinal: Negative for abdominal pain, blood in stool, constipation, diarrhea, nausea and vomiting. Genitourinary: Negative for difficulty urinating, dysuria, frequency, pelvic pain, urgency and vaginal bleeding. Musculoskeletal: Negative for arthralgias and myalgias. Skin: Negative for rash.    Neurological: Negative for dizziness, syncope, weakness, light-headedness, numbness and headaches. Hematological: Negative for adenopathy. Psychiatric/Behavioral: Negative for suicidal ideas. The patient is not nervous/anxious. Current Outpatient Medications:     ALPRAZolam (XANAX) 1 MG tablet, Take 1 tablet by mouth 2 times daily for 90 days. , Disp: 60 tablet, Rfl: 2    fluticasone (FLONASE) 50 MCG/ACT nasal spray, 2 sprays by Nasal route daily, Disp: 1 Bottle, Rfl: 5    loratadine (CLARITIN) 10 MG tablet, Take 1 tablet by mouth daily as needed (allergies), Disp: 30 tablet, Rfl: 2    promethazine (PHENERGAN) 25 MG tablet, Take 1 tablet by mouth every 8 hours as needed for Nausea 1-2 tabs q 4 hrs prn, Disp: 40 tablet, Rfl: 1    Magic Mouthwash (MIRACLE MOUTHWASH), Swish and spit 5 mLs 4 times daily as needed for Irritation, Disp: 50 mL, Rfl: 0    valACYclovir (VALTREX) 1 g tablet, Take 1 tablet by mouth daily, Disp: 30 tablet, Rfl: 2    DULoxetine (CYMBALTA) 60 MG extended release capsule, Take 1 capsule by mouth daily, Disp: 30 capsule, Rfl: 2    omeprazole (PRILOSEC) 40 MG delayed release capsule, Take 1 capsule by mouth daily, Disp: 30 capsule, Rfl: 5    montelukast (SINGULAIR) 10 MG tablet, Take 1 tablet by mouth nightly, Disp: 30 tablet, Rfl: 2    gabapentin (NEURONTIN) 600 MG tablet, 1 tab 2-3 times qd, Disp: 75 tablet, Rfl: 2    cyclobenzaprine (FLEXERIL) 5 MG tablet, Take 1 tablet by mouth 3 times daily as needed for Muscle spasms, Disp: 60 tablet, Rfl: 2    albuterol sulfate  (90 Base) MCG/ACT inhaler, Inhale 2 puffs into the lungs every 4 hours as needed for Wheezing, Disp: 1 Inhaler, Rfl: 3    amitriptyline (ELAVIL) 10 MG tablet, Take 10 mg by mouth nightly , Disp: , Rfl:     perphenazine 2 MG tablet, Take 2 mg by mouth daily , Disp: , Rfl:     SF 5000 PLUS 1.1 % CREA, use as directed twice a day, Disp: , Rfl: 0    atenolol (TENORMIN) 25 MG tablet, Alternates between 25mg one day and the next 50mg (then repeat), Disp: , Rfl: 0    CAMRESE LO 0.1-0.02 & 0.01 MG TABS, , Disp: , Rfl: 0    VIBERZI 100 MG TABS, 1 tablet 2 times daily. , Disp: , Rfl: 0    Allergies   Allergen Reactions    Latex     Ibuprofen Other (See Comments)     NSAIDs trigger colitis        Past Medical History:   Diagnosis Date    Asthma     Sosa's esophagus without dysplasia 2019    Chronic allergic rhinitis 2019    Colitis 2019    Depression     Esophageal spasm 2019    Gastroesophageal reflux disease without esophagitis 2019    Generalized anxiety disorder 2019    Hyperthyroidism 2019    Impaired fasting glucose 2019    Insomnia     Irritable bowel syndrome     Seasonal allergies     Ulcerative colitis (HCC)        Past Surgical History:   Procedure Laterality Date    NASAL SEPTUM SURGERY      biopsy     WISDOM TOOTH EXTRACTION         Family History   Problem Relation Age of Onset    Osteoarthritis Mother     Elevated Lipids Mother     Heart Disease Father     Other Father         back pain     Elevated Lipids Father        Social History     Socioeconomic History    Marital status:      Spouse name: Not on file    Number of children: Not on file    Years of education: Not on file    Highest education level: Not on file   Occupational History    Not on file   Social Needs    Financial resource strain: Not on file    Food insecurity     Worry: Not on file     Inability: Not on file    Transportation needs     Medical: Not on file     Non-medical: Not on file   Tobacco Use    Smoking status: Former Smoker     Packs/day: 0.50     Years: 1.50     Pack years: 0.75     Types: Cigarettes     Last attempt to quit: 1999     Years since quittin.1    Smokeless tobacco: Never Used   Substance and Sexual Activity    Alcohol use: Yes     Comment: socially     Drug use: Not on file    Sexual activity: Not on file   Lifestyle    Physical activity     Days per week: Not on file     Minutes per session: Not on file reflux, depression and anxiety. Diagnoses and all orders for this visit:    Oral mucosal lesion  - magic mouth wash   - dental referral     Anal fissure  - following with [facility]   - placed on nifedipine - currently off   - topical lidocaine - currently off   - resolved     Gayle's esophagus without dysplasia  - last EGD 1/2019 - no gayle seen  - on omeprazole  - following with GI  - follow up EGD in 3 years (2022)     Gastroesophageal reflux disease without esophagitis  - continue present treatment  - watch diet  - on omeprazole   - discussed long term use side effects  - EGD 1/2019, esophageal spasm and gastritis, no gayle  - recommended haldol by GI - did not tolerate side effects   - on perphen-amitriptyline  - stable     Colitis  - continue present treatment  - following up with GI  - uncertain etiology   - stopped lialda  - now stable  - last colonoscopy 2019     Impaired fasting glucose  hyperglycemia  - Follow A1c     Mixed hyperlipidemia  - continue present treatment  - watch diet    Hyperthyroidism  - following with endocrinology  - off methimazole  - on atenolol on alternating doses  - last lab (1/2020) - normal     Moderate episode of recurrent major depressive disorder [facility]  - continue present treatment  - off paxil  - on duloxetine given helps also with joint pain at 60mg  - stable     Generalized anxiety disorder  - continue present treatment  - off paxil  - on duloxetine given helps also with joint pain at 60mg  - on xanax  - suggest counseling - declines  - Stable     [place] report reviewed (9/16/20)  controlled substance agreement updated (2/25/20)    Patient requests benzodiazepine medication refill. I have reviewed the current medications and prior notes regarding the need for these refills. I believe the need for refill is warranted at this time. I have reviewed the [place] report and found no suspicion of drug seeking behavior.  I have discussed the side effects and narcotic/benzodiazepine policy with this patient and the patient has demonstrated understanding. A follow up appointment will be scheduled with me. Esophageal spasm  - EGD (2019) - esophageal spasm and gastritis, no gayle  - recommended haldol by GI - did not tolerate   - concern with antipsychotic, long term use adverse reactions and other drug interactions and uncertain indication. - on perphen-amitriptyline  - unclear if calcium channel blocker is contraindicated  - Marinol not covered    Post herpetic neuralgia  - on gabapentin and valtrex  - Stable    Chronic allergic rhinitis  - continue present treatment  - on singulair    Vitamin D deficiency  - continue present treatment  - on vitamin D3 2000units daily   - follow labs     Primary osteoarthritis involving multiple joints  - uncertain etiology  - improved on duloxetine  - Stable     Long term use of drug    Return in about 3 months (around 2020) for check up and review. No orders of the defined types were placed in this encounter. Requested Prescriptions     Signed Prescriptions Disp Refills    ALPRAZolam (XANAX) 1 MG tablet 60 tablet 2     Sig: Take 1 tablet by mouth 2 times daily for 90 days.     fluticasone (FLONASE) 50 MCG/ACT nasal spray 1 Bottle 5     Si sprays by Nasal route daily    loratadine (CLARITIN) 10 MG tablet 30 tablet 2     Sig: Take 1 tablet by mouth daily as needed (allergies)    promethazine (PHENERGAN) 25 MG tablet 40 tablet 1     Sig: Take 1 tablet by mouth every 8 hours as needed for Nausea 1-2 tabs q 4 hrs prn    Magic Mouthwash (MIRACLE MOUTHWASH) 50 mL 0     Sig: Swish and spit 5 mLs 4 times daily as needed for Irritation       Cydney Daniel MD  2020  4:28 PM

## 2020-11-02 ENCOUNTER — TELEPHONE (OUTPATIENT)
Dept: PODIATRY | Age: 40
End: 2020-11-02

## 2020-11-02 NOTE — TELEPHONE ENCOUNTER
Returned pts phone message in regards to making an appt. No answer. Left message to call for scheduling.

## 2020-11-04 RX ORDER — GABAPENTIN 600 MG/1
TABLET ORAL
Qty: 75 TABLET | Refills: 0 | Status: SHIPPED
Start: 2020-11-04 | End: 2020-12-14 | Stop reason: SDUPTHER

## 2020-11-04 NOTE — TELEPHONE ENCOUNTER
Pharmacy is requesting a refill on Gabapentin. 3 mo supply was given on 8/24/20. Pt's f/u appt is scheduled for 12/14/20 and she will be short.

## 2020-11-10 LAB
AVERAGE GLUCOSE: NORMAL
HBA1C MFR BLD: 5.5 %

## 2020-11-30 RX ORDER — MONTELUKAST SODIUM 10 MG/1
10 TABLET ORAL NIGHTLY
Qty: 30 TABLET | Refills: 0 | Status: SHIPPED
Start: 2020-11-30 | End: 2020-12-14 | Stop reason: SDUPTHER

## 2020-12-07 RX ORDER — DULOXETIN HYDROCHLORIDE 60 MG/1
60 CAPSULE, DELAYED RELEASE ORAL DAILY
Qty: 30 CAPSULE | Refills: 0 | Status: SHIPPED
Start: 2020-12-07 | End: 2020-12-14 | Stop reason: SDUPTHER

## 2020-12-07 RX ORDER — VALACYCLOVIR HYDROCHLORIDE 1 G/1
1000 TABLET, FILM COATED ORAL DAILY
Qty: 30 TABLET | Refills: 0 | Status: SHIPPED
Start: 2020-12-07 | End: 2020-12-14 | Stop reason: SDUPTHER

## 2020-12-07 NOTE — TELEPHONE ENCOUNTER
Pharmacy is requesting a refill    Last Appointment:  9/16/2020  Future Appointments   Date Time Provider Jeanette Nielsen   12/14/2020  4:00 PM Mesha Torres  W [facility] [address]

## 2020-12-14 ENCOUNTER — OFFICE VISIT (OUTPATIENT)
Dept: FAMILY MEDICINE CLINIC | Age: 40
End: 2020-12-14
Payer: COMMERCIAL

## 2020-12-14 VITALS
OXYGEN SATURATION: 98 % | HEART RATE: 106 BPM | HEIGHT: 63 IN | BODY MASS INDEX: 37.61 KG/M2 | SYSTOLIC BLOOD PRESSURE: 130 MMHG | TEMPERATURE: 98 F | DIASTOLIC BLOOD PRESSURE: 86 MMHG | WEIGHT: 212.25 LBS

## 2020-12-14 PROCEDURE — 1036F TOBACCO NON-USER: CPT | Performed by: INTERNAL MEDICINE

## 2020-12-14 PROCEDURE — G8484 FLU IMMUNIZE NO ADMIN: HCPCS | Performed by: INTERNAL MEDICINE

## 2020-12-14 PROCEDURE — G8427 DOCREV CUR MEDS BY ELIG CLIN: HCPCS | Performed by: INTERNAL MEDICINE

## 2020-12-14 PROCEDURE — 99213 OFFICE O/P EST LOW 20 MIN: CPT | Performed by: INTERNAL MEDICINE

## 2020-12-14 PROCEDURE — G8417 CALC BMI ABV UP PARAM F/U: HCPCS | Performed by: INTERNAL MEDICINE

## 2020-12-14 RX ORDER — AMOXICILLIN 500 MG/1
1 TABLET, FILM COATED ORAL 3 TIMES DAILY
COMMUNITY
End: 2021-01-21

## 2020-12-14 RX ORDER — VALACYCLOVIR HYDROCHLORIDE 1 G/1
1000 TABLET, FILM COATED ORAL DAILY
Qty: 30 TABLET | Refills: 2 | Status: SHIPPED
Start: 2020-12-14 | End: 2021-03-23 | Stop reason: SDUPTHER

## 2020-12-14 RX ORDER — GABAPENTIN 600 MG/1
TABLET ORAL
Qty: 75 TABLET | Refills: 2 | Status: SHIPPED
Start: 2020-12-14 | End: 2021-03-23 | Stop reason: SDUPTHER

## 2020-12-14 RX ORDER — ACETAMINOPHEN 500 MG
500 TABLET ORAL EVERY 6 HOURS PRN
COMMUNITY

## 2020-12-14 RX ORDER — DULOXETIN HYDROCHLORIDE 60 MG/1
60 CAPSULE, DELAYED RELEASE ORAL DAILY
Qty: 30 CAPSULE | Refills: 2 | Status: SHIPPED
Start: 2020-12-14 | End: 2021-03-23 | Stop reason: SDUPTHER

## 2020-12-14 RX ORDER — ALPRAZOLAM 1 MG/1
1 TABLET ORAL 2 TIMES DAILY
Qty: 60 TABLET | Refills: 2 | Status: SHIPPED | OUTPATIENT
Start: 2020-12-14 | End: 2021-03-23 | Stop reason: SDUPTHER

## 2020-12-14 RX ORDER — CYCLOBENZAPRINE HCL 5 MG
5 TABLET ORAL 3 TIMES DAILY PRN
Qty: 60 TABLET | Refills: 2 | Status: SHIPPED
Start: 2020-12-14 | End: 2021-03-23 | Stop reason: SDUPTHER

## 2020-12-14 RX ORDER — MONTELUKAST SODIUM 10 MG/1
10 TABLET ORAL NIGHTLY
Qty: 30 TABLET | Refills: 2 | Status: SHIPPED
Start: 2020-12-14 | End: 2021-03-23 | Stop reason: SDUPTHER

## 2020-12-14 ASSESSMENT — ENCOUNTER SYMPTOMS
VOMITING: 0
SORE THROAT: 0
RHINORRHEA: 0
EYE PAIN: 0
ABDOMINAL PAIN: 0
CHEST TIGHTNESS: 0
NAUSEA: 0
BLOOD IN STOOL: 0
DIARRHEA: 0
SHORTNESS OF BREATH: 0
CONSTIPATION: 0
COUGH: 0

## 2020-12-14 NOTE — PROGRESS NOTES
[facility]) Physicians   Internal Medicine     2020  [facility] : 1980 Sex: female  Age:40 y.o. Chief Complaint   Patient presents with    3 Month Follow-Up    Gastroesophageal Reflux    Hyperlipidemia    Depression        HPI:   Patient presents to office for review and management of chronic medical conditions.    - States has lesion to mouth and throat. States has been taking tylenol. Was using magic mouth wash. States due to denatal disease, cracked tooth. Has been to dentist. States located to under tongue on right. Improved     - Has impaired fasting glucose. Following with endocrinology. States A1c was good. - States GERD, states on omeprazole - had EGD and colonoscopy - Sosa esophagus on (2016), follow up 2019 - no Sosa - states esophageal spasm and gastritis. Now on perphen-amitriptyline. Stopped phenergan and isosorbide as needed. States suggested haldol or Marinol, not covered. Has side effects with haldol, now off.     - States having severe IBS with diarrhea, possible ulcerative colitis. Stopped Lialda. No abdo pain. No current bowel urgency, bleeding. States bloating and gas. No fever or chills. States follows with GI. States on Viberzi twice a day or once a day. Last colonoscopy was 2019    - States anxiety is more controlled. On duloxetine and on xanax. States taking xanax daily and sometimes twice a day. States also helps with sleeping. Did not go to counseling. No Suicidal thoughts. No reported side effects with medications. States meds are helping symptoms.    - States shingles has become recurrent. Stable. States saw infectious disease and placed on valtrex daily for suppressive treatment. States taking gabapentin 600mg twice a day for post herpetic neuralgia. Not currently following with ID.     - States allergy and sinus symptoms have improved with Flonase. Stable. States taking loratadine as needed. On Singulair    - States back pain is stable. States comes and goes. States gabapentin has helped. - States now seeing endocrinology for graves disease. States methimazole stopped. State on atenolol 50mg alternating with 25mg. Last TSH was stable. Last endo visit was (11/20) - check US thryoid in 2 years     - States insomnia has improved with xanax and duloxetine    - States had childhood asthma. States singular has helped. States loratadine and ProAir as needed. States no wheezing or SOB. - States has seen gyn and placed on OCP for irregular menstrual cycles, last visit (11/2020)     Health Maintenance   - immunizations:   Influenza Vaccination  - declines  Pneumonia Vaccination  Zoster/Shingles Vaccine  Tetanus Vaccination - (2011)     - Screenings:   Bone Density Scan - (2/8/2017)  Pelvic/Pap Exam - (2017), (2019) - gyn  Mammogram     Colonoscopy - (8/2016) - polyp, hyperplastic, internal hemorrhoids, (1/2019) - tortuous colon, colon spasm, diverticulosis, No UC seen    EGD - (8/2016) - Sosa's esophagus, gastric polyps, (1/2019), Mild to mod chronic active gastritis - esophageal spasm    ROS:  Review of Systems   Constitutional: Negative for appetite change, chills, fever and unexpected weight change. HENT: Negative for congestion, rhinorrhea and sore throat. Eyes: Negative for pain and visual disturbance. Respiratory: Negative for cough, chest tightness and shortness of breath. Cardiovascular: Negative for chest pain and palpitations. Gastrointestinal: Negative for abdominal pain, blood in stool, constipation, diarrhea, nausea and vomiting. Genitourinary: Negative for difficulty urinating, dysuria, frequency, pelvic pain, urgency and vaginal bleeding. Musculoskeletal: Negative for arthralgias and myalgias. Skin: Negative for rash. Neurological: Negative for dizziness, syncope, weakness, light-headedness, numbness and headaches. Hematological: Negative for adenopathy. Psychiatric/Behavioral: Negative for suicidal ideas.  The patient is not nervous/anxious. Current Outpatient Medications:     Amoxicillin 500 MG TABS, Take 1 tablet by mouth 3 times daily, Disp: , Rfl:     acetaminophen (TYLENOL) 500 MG tablet, Take 500 mg by mouth every 6 hours as needed for Pain, Disp: , Rfl:     ALPRAZolam (XANAX) 1 MG tablet, Take 1 tablet by mouth 2 times daily for 90 days. , Disp: 60 tablet, Rfl: 2    cyclobenzaprine (FLEXERIL) 5 MG tablet, Take 1 tablet by mouth 3 times daily as needed for Muscle spasms, Disp: 60 tablet, Rfl: 2    DULoxetine (CYMBALTA) 60 MG extended release capsule, Take 1 capsule by mouth daily, Disp: 30 capsule, Rfl: 2    gabapentin (NEURONTIN) 600 MG tablet, 1 tab 2-3 times qd, Disp: 75 tablet, Rfl: 2    montelukast (SINGULAIR) 10 MG tablet, Take 1 tablet by mouth nightly, Disp: 30 tablet, Rfl: 2    valACYclovir (VALTREX) 1 g tablet, Take 1 tablet by mouth daily, Disp: 30 tablet, Rfl: 2    fluticasone (FLONASE) 50 MCG/ACT nasal spray, 2 sprays by Nasal route daily, Disp: 1 Bottle, Rfl: 5    loratadine (CLARITIN) 10 MG tablet, Take 1 tablet by mouth daily as needed (allergies), Disp: 30 tablet, Rfl: 2    promethazine (PHENERGAN) 25 MG tablet, Take 1 tablet by mouth every 8 hours as needed for Nausea 1-2 tabs q 4 hrs prn, Disp: 40 tablet, Rfl: 1    omeprazole (PRILOSEC) 40 MG delayed release capsule, Take 1 capsule by mouth daily, Disp: 30 capsule, Rfl: 5    albuterol sulfate  (90 Base) MCG/ACT inhaler, Inhale 2 puffs into the lungs every 4 hours as needed for Wheezing, Disp: 1 Inhaler, Rfl: 3    amitriptyline (ELAVIL) 10 MG tablet, Take 10 mg by mouth nightly , Disp: , Rfl:     perphenazine 2 MG tablet, Take 2 mg by mouth daily , Disp: , Rfl:     SF 5000 PLUS 1.1 % CREA, use as directed twice a day, Disp: , Rfl: 0    atenolol (TENORMIN) 25 MG tablet, Alternates between 25mg one day and the next 50mg (then repeat), Disp: , Rfl: 0    CAMRESE LO 0.1-0.02 & 0.01 MG TABS, , Disp: , Rfl: 0    VIBERZI connections     Talks on phone: Not on file     Gets together: Not on file     Attends Yazidism service: Not on file     Active member of club or organization: Not on file     Attends meetings of clubs or organizations: Not on file     Relationship status: Not on file    Intimate partner violence     Fear of current or ex partner: Not on file     Emotionally abused: Not on file     Physically abused: Not on file     Forced sexual activity: Not on file   Other Topics Concern    Not on file   Social History Narrative    Not on file       Vitals:    12/14/20 1618   BP: (!) 148/98   Site: Left Upper Arm   Position: Sitting   Cuff Size: Large Adult   Pulse: 106   Temp: 98 °F (36.7 °C)   SpO2: 98%   Weight: 212 lb 4 oz (96.3 kg)   Height: 5' 3\" (1.6 m)       Exam:  Physical Exam  Constitutional:       Appearance: She is well-developed. HENT:      Head: Normocephalic and atraumatic. Right Ear: External ear normal.      Left Ear: External ear normal.   Eyes:      Pupils: Pupils are equal, round, and reactive to light. Neck:      Musculoskeletal: Normal range of motion and neck supple. Thyroid: No thyromegaly. Cardiovascular:      Rate and Rhythm: Normal rate and regular rhythm. Heart sounds: Normal heart sounds. No murmur. Pulmonary:      Effort: Pulmonary effort is normal.      Breath sounds: Normal breath sounds. No wheezing. Abdominal:      General: Bowel sounds are normal. There is no distension. Palpations: Abdomen is soft. Tenderness: There is no abdominal tenderness. There is no guarding or rebound. Musculoskeletal: Normal range of motion. Lymphadenopathy:      Cervical: No cervical adenopathy. Skin:     General: Skin is warm and dry. Neurological:      Mental Status: She is alert and oriented to person, place, and time. Cranial Nerves: No cranial nerve deficit.    Psychiatric:         Judgment: Judgment normal.         Assessment and Plan:    Carlie Evans was seen today scheduled with me. Esophageal spasm  - EGD (2019) - esophageal spasm and gastritis, no gayle  - recommended haldol by GI - did not tolerate   - concern with antipsychotic, long term use adverse reactions and other drug interactions and uncertain indication. - on perphen-amitriptyline  - unclear if calcium channel blocker is contraindicated  - Marinol not covered    Post herpetic neuralgia  - on gabapentin and valtrex  - Stable    Chronic allergic rhinitis  - continue present treatment  - on singulair    Vitamin D deficiency  - continue present treatment  - on vitamin D3 2000units daily   - follow labs     Primary osteoarthritis involving multiple joints  - uncertain etiology  - improved on duloxetine  - Stable     Long term use of drug    Return in about 3 months (around 3/14/2021) for check up and review. No orders of the defined types were placed in this encounter. Requested Prescriptions     Signed Prescriptions Disp Refills    ALPRAZolam (XANAX) 1 MG tablet 60 tablet 2     Sig: Take 1 tablet by mouth 2 times daily for 90 days.     cyclobenzaprine (FLEXERIL) 5 MG tablet 60 tablet 2     Sig: Take 1 tablet by mouth 3 times daily as needed for Muscle spasms    DULoxetine (CYMBALTA) 60 MG extended release capsule 30 capsule 2     Sig: Take 1 capsule by mouth daily    gabapentin (NEURONTIN) 600 MG tablet 75 tablet 2     Si tab 2-3 times qd    montelukast (SINGULAIR) 10 MG tablet 30 tablet 2     Sig: Take 1 tablet by mouth nightly    valACYclovir (VALTREX) 1 g tablet 30 tablet 2     Sig: Take 1 tablet by mouth daily       Kori Cantrell MD  2020  4:43 PM

## 2021-01-08 ENCOUNTER — TELEPHONE (OUTPATIENT)
Dept: FAMILY MEDICINE CLINIC | Age: 41
End: 2021-01-08

## 2021-01-08 NOTE — TELEPHONE ENCOUNTER
Martin Fregoso calling about an infected toothe that was just pulled on Wed. She had gone to the ER on the weekend with a swollen jaw and was told it was infected. She was given Clindamycin & Tylenol #3 every 6 hours for pain. The tooth was pulled by the dentist on wed. She took the last pain pill last night and asking if you will give her a few more to get her through the next 2 days?

## 2021-01-21 ENCOUNTER — TELEPHONE (OUTPATIENT)
Dept: FAMILY MEDICINE CLINIC | Age: 41
End: 2021-01-21

## 2021-01-21 ENCOUNTER — OFFICE VISIT (OUTPATIENT)
Dept: FAMILY MEDICINE CLINIC | Age: 41
End: 2021-01-21
Payer: COMMERCIAL

## 2021-01-21 VITALS
WEIGHT: 219.2 LBS | TEMPERATURE: 97.4 F | DIASTOLIC BLOOD PRESSURE: 88 MMHG | HEART RATE: 110 BPM | SYSTOLIC BLOOD PRESSURE: 138 MMHG | BODY MASS INDEX: 38.84 KG/M2 | HEIGHT: 63 IN | OXYGEN SATURATION: 97 % | RESPIRATION RATE: 18 BRPM

## 2021-01-21 DIAGNOSIS — N39.0 URINARY TRACT INFECTION WITHOUT HEMATURIA, SITE UNSPECIFIED: ICD-10-CM

## 2021-01-21 DIAGNOSIS — R35.0 FREQUENCY OF MICTURITION: ICD-10-CM

## 2021-01-21 DIAGNOSIS — R30.0 DYSURIA: ICD-10-CM

## 2021-01-21 LAB
BILIRUBIN, POC: NEGATIVE
BLOOD URINE, POC: NORMAL
CLARITY, POC: CLEAR
COLOR, POC: CLEAR
CONTROL: NORMAL
GLUCOSE URINE, POC: NEGATIVE
KETONES, POC: NEGATIVE
LEUKOCYTE EST, POC: NORMAL
NITRITE, POC: NEGATIVE
PH, POC: 5.5
PREGNANCY TEST URINE, POC: NEGATIVE
PROTEIN, POC: NEGATIVE
SPECIFIC GRAVITY, POC: 1
UROBILINOGEN, POC: 0.2

## 2021-01-21 PROCEDURE — G8484 FLU IMMUNIZE NO ADMIN: HCPCS | Performed by: INTERNAL MEDICINE

## 2021-01-21 PROCEDURE — G8427 DOCREV CUR MEDS BY ELIG CLIN: HCPCS | Performed by: INTERNAL MEDICINE

## 2021-01-21 PROCEDURE — 1036F TOBACCO NON-USER: CPT | Performed by: INTERNAL MEDICINE

## 2021-01-21 PROCEDURE — 99213 OFFICE O/P EST LOW 20 MIN: CPT | Performed by: INTERNAL MEDICINE

## 2021-01-21 PROCEDURE — 81025 URINE PREGNANCY TEST: CPT | Performed by: INTERNAL MEDICINE

## 2021-01-21 PROCEDURE — 81002 URINALYSIS NONAUTO W/O SCOPE: CPT | Performed by: INTERNAL MEDICINE

## 2021-01-21 PROCEDURE — G8417 CALC BMI ABV UP PARAM F/U: HCPCS | Performed by: INTERNAL MEDICINE

## 2021-01-21 RX ORDER — VALACYCLOVIR HYDROCHLORIDE 1 G/1
1000 TABLET, FILM COATED ORAL 3 TIMES DAILY
Qty: 21 TABLET | Refills: 0 | Status: SHIPPED | OUTPATIENT
Start: 2021-01-21 | End: 2021-01-28

## 2021-01-21 RX ORDER — NITROFURANTOIN 25; 75 MG/1; MG/1
100 CAPSULE ORAL 2 TIMES DAILY
Qty: 14 CAPSULE | Refills: 0 | Status: SHIPPED | OUTPATIENT
Start: 2021-01-21 | End: 2021-01-28

## 2021-01-21 ASSESSMENT — ENCOUNTER SYMPTOMS
ABDOMINAL DISTENTION: 0
NAUSEA: 0
ABDOMINAL PAIN: 0

## 2021-01-21 NOTE — PROGRESS NOTES
[facility]     21  Faustina Jaquez : 1980 Sex: female  Age: 41 y.o. Chief Complaint   Patient presents with    Urinary Tract Infection     onset a few weeks     Dysuria       HPI  Patient presents to express care today complaining of urinary tract symptoms including dysuria frequency and urgency over the last 2 weeks. She states 3+ weeks ago she had a tooth pulled and was placed on amoxicillin and clindamycin for a week. She states she was on the antibiotics when her symptoms began. She finished them up a day or 2 into the course of her symptoms. She denies any hematuria or flank pain. No abdominal symptoms. Denies any vaginal discharge. Denies pregnancy. Review of Systems   Constitutional: Negative for chills and fever. Gastrointestinal: Negative for abdominal distention, abdominal pain and nausea. Genitourinary: Positive for dysuria, frequency and urgency. Negative for flank pain and hematuria. REST OF PERTINENT ROS GONE OVER AND WAS NEGATIVE. Current Outpatient Medications:     valACYclovir (VALTREX) 1 g tablet, Take 1 tablet by mouth 3 times daily for 7 days, Disp: 21 tablet, Rfl: 0    nitrofurantoin, macrocrystal-monohydrate, (MACROBID) 100 MG capsule, Take 1 capsule by mouth 2 times daily for 7 days, Disp: 14 capsule, Rfl: 0    acetaminophen (TYLENOL) 500 MG tablet, Take 500 mg by mouth every 6 hours as needed for Pain, Disp: , Rfl:     ALPRAZolam (XANAX) 1 MG tablet, Take 1 tablet by mouth 2 times daily for 90 days. , Disp: 60 tablet, Rfl: 2    cyclobenzaprine (FLEXERIL) 5 MG tablet, Take 1 tablet by mouth 3 times daily as needed for Muscle spasms, Disp: 60 tablet, Rfl: 2    DULoxetine (CYMBALTA) 60 MG extended release capsule, Take 1 capsule by mouth daily, Disp: 30 capsule, Rfl: 2    montelukast (SINGULAIR) 10 MG tablet, Take 1 tablet by mouth nightly, Disp: 30 tablet, Rfl: 2   valACYclovir (VALTREX) 1 g tablet, Take 1 tablet by mouth daily, Disp: 30 tablet, Rfl: 2    fluticasone (FLONASE) 50 MCG/ACT nasal spray, 2 sprays by Nasal route daily, Disp: 1 Bottle, Rfl: 5    loratadine (CLARITIN) 10 MG tablet, Take 1 tablet by mouth daily as needed (allergies), Disp: 30 tablet, Rfl: 2    promethazine (PHENERGAN) 25 MG tablet, Take 1 tablet by mouth every 8 hours as needed for Nausea 1-2 tabs q 4 hrs prn, Disp: 40 tablet, Rfl: 1    omeprazole (PRILOSEC) 40 MG delayed release capsule, Take 1 capsule by mouth daily, Disp: 30 capsule, Rfl: 5    albuterol sulfate  (90 Base) MCG/ACT inhaler, Inhale 2 puffs into the lungs every 4 hours as needed for Wheezing, Disp: 1 Inhaler, Rfl: 3    amitriptyline (ELAVIL) 10 MG tablet, Take 10 mg by mouth nightly , Disp: , Rfl:     perphenazine 2 MG tablet, Take 2 mg by mouth daily , Disp: , Rfl:     SF 5000 PLUS 1.1 % CREA, use as directed twice a day, Disp: , Rfl: 0    atenolol (TENORMIN) 25 MG tablet, Alternates between 25mg one day and the next 50mg (then repeat), Disp: , Rfl: 0    CAMRESE LO 0.1-0.02 & 0.01 MG TABS, , Disp: , Rfl: 0    VIBERZI 100 MG TABS, 1 tablet 2 times daily. , Disp: , Rfl: 0    gabapentin (NEURONTIN) 600 MG tablet, 1 tab 2-3 times qd, Disp: 75 tablet, Rfl: 2  Allergies   Allergen Reactions    Latex     Ibuprofen Other (See Comments)     NSAIDs trigger colitis        Past Medical History:   Diagnosis Date    Asthma     Sosa's esophagus without dysplasia 7/23/2019    Chronic allergic rhinitis 7/23/2019    Colitis 7/23/2019    Depression     Esophageal spasm 7/23/2019    Gastroesophageal reflux disease without esophagitis 7/23/2019    Generalized anxiety disorder 7/23/2019    Hyperthyroidism 7/23/2019    Impaired fasting glucose 7/23/2019    Insomnia     Irritable bowel syndrome     Seasonal allergies     Ulcerative colitis ([address]      Past Surgical History:   Procedure Laterality Date  NASAL SEPTUM SURGERY      biopsy     WISDOM TOOTH EXTRACTION       Family History   Problem Relation Age of Onset    Osteoarthritis Mother     Elevated Lipids Mother     Heart Disease Father     Other Father         back pain     Elevated Lipids Father      Social History     Socioeconomic History    Marital status:      Spouse name: Not on file    Number of children: Not on file    Years of education: Not on file    Highest education level: Not on file   Occupational History    Not on file   Social Needs    Financial resource strain: Not on file    Food insecurity     Worry: Not on file     Inability: Not on file    Transportation needs     Medical: Not on file     Non-medical: Not on file   Tobacco Use    Smoking status: Former Smoker     Packs/day: 0.50     Years: 1.50     Pack years: 0.75     Types: Cigarettes     Quit date: 1999     Years since quittin.5    Smokeless tobacco: Never Used   Substance and Sexual Activity    Alcohol use: Yes     Comment: socially     Drug use: Not on file    Sexual activity: Not on file   Lifestyle    Physical activity     Days per week: Not on file     Minutes per session: Not on file    Stress: Not on file   Relationships    Social connections     Talks on phone: Not on file     Gets together: Not on file     Attends Islam service: Not on file     Active member of club or organization: Not on file     Attends meetings of clubs or organizations: Not on file     Relationship status: Not on file    Intimate partner violence     Fear of current or ex partner: Not on file     Emotionally abused: Not on file     Physically abused: Not on file     Forced sexual activity: Not on file   Other Topics Concern    Not on file   Social History Narrative    Not on file       Vitals:    21 1512   BP: 138/88   Pulse: 110   Resp: 18   Temp: 97.4 °F (36.3 °C)   SpO2: 97%   Weight: 219 lb 3.2 oz (99.4 kg)   Height: 5' 3\" (1.6 m) Physical Exam  Vitals signs and nursing note reviewed. Constitutional:       General: She is not in acute distress. Appearance: She is not ill-appearing. Abdominal:      General: Bowel sounds are normal.      Palpations: Abdomen is soft. Tenderness: There is no abdominal tenderness. There is no right CVA tenderness or left CVA tenderness. Comments: No suprapubic tenderness to palpation nor flank tenderness to percussion noted. Neurological:      Mental Status: She is alert and oriented to person, place, and time. Psychiatric:         Mood and Affect: Mood normal.         Behavior: Behavior normal.         Thought Content: Thought content normal.         Judgment: Judgment normal.                 Assessment and Plan:  Cierra Marcano was seen today for urinary tract infection and dysuria. Diagnoses and all orders for this visit:    Dysuria  -     POCT Urinalysis no Micro  -     Culture, Urine  -     POCT urine pregnancy    Frequency of micturition  -     POCT urine pregnancy    Urinary tract infection without hematuria, site unspecified  -     POCT urine pregnancy    Other orders  -     nitrofurantoin, macrocrystal-monohydrate, (MACROBID) 100 MG capsule; Take 1 capsule by mouth 2 times daily for 7 days      Plan: Urine pregnancy point-of-care was negative. Urine dip trace leukocytes. Culture was sent. Empiric treatment with nitrofurantoin. Warned of potential side effects. Probiotic. Push fluids. Follow-up with PCP. Notify us if not improving. No follow-ups on file. Seen By:  Dyana Carrington MD      *Document was created using voice recognition software. Note was reviewed however may contain grammatical errors.

## 2021-01-21 NOTE — TELEPHONE ENCOUNTER
Pt called to say that she is having a shingles outbreak and is asking if you would prescribe Valtrex 1 tid instead of 1 daily.

## 2021-01-21 NOTE — TELEPHONE ENCOUNTER
Requested Prescriptions     Signed Prescriptions Disp Refills    valACYclovir (VALTREX) 1 g tablet 21 tablet 0     Sig: Take 1 tablet by mouth 3 times daily for 7 days     Authorizing Provider: Myrna Beck to pharmacy

## 2021-01-24 ENCOUNTER — TELEPHONE (OUTPATIENT)
Dept: FAMILY MEDICINE CLINIC | Age: 41
End: 2021-01-24

## 2021-01-24 LAB — URINE CULTURE, ROUTINE: NORMAL

## 2021-01-24 NOTE — TELEPHONE ENCOUNTER
Urine culture looks more like contaminated results. If she is improving with the antibiotic she can finish it.   If not stop it and she needs reevaluated by PCP

## 2021-03-01 DIAGNOSIS — K22.70 BARRETT'S ESOPHAGUS WITHOUT DYSPLASIA: ICD-10-CM

## 2021-03-01 DIAGNOSIS — K21.9 GASTROESOPHAGEAL REFLUX DISEASE WITHOUT ESOPHAGITIS: ICD-10-CM

## 2021-03-01 DIAGNOSIS — K22.4 ESOPHAGEAL SPASM: ICD-10-CM

## 2021-03-01 RX ORDER — OMEPRAZOLE 40 MG/1
40 CAPSULE, DELAYED RELEASE ORAL DAILY
Qty: 30 CAPSULE | Refills: 0 | Status: SHIPPED
Start: 2021-03-01 | End: 2021-03-23 | Stop reason: SDUPTHER

## 2021-03-01 NOTE — TELEPHONE ENCOUNTER
Pharmacy is requesting a refill     Last Appointment:  12/14/2020  Future Appointments   Date Time Provider Jeanette Nielsen   3/8/2021  4:00 PM Irma Barron  [address] [place] [address]

## 2021-03-23 ENCOUNTER — OFFICE VISIT (OUTPATIENT)
Dept: FAMILY MEDICINE CLINIC | Age: 41
End: 2021-03-23
Payer: COMMERCIAL

## 2021-03-23 VITALS
BODY MASS INDEX: 38.45 KG/M2 | WEIGHT: 217 LBS | TEMPERATURE: 97.3 F | DIASTOLIC BLOOD PRESSURE: 84 MMHG | SYSTOLIC BLOOD PRESSURE: 124 MMHG | OXYGEN SATURATION: 98 % | HEIGHT: 63 IN | HEART RATE: 89 BPM

## 2021-03-23 DIAGNOSIS — K22.4 ESOPHAGEAL SPASM: ICD-10-CM

## 2021-03-23 DIAGNOSIS — E78.2 MIXED HYPERLIPIDEMIA: ICD-10-CM

## 2021-03-23 DIAGNOSIS — J45.20 MILD INTERMITTENT ASTHMA WITHOUT COMPLICATION: ICD-10-CM

## 2021-03-23 DIAGNOSIS — K51.919 ULCERATIVE COLITIS WITH COMPLICATION, UNSPECIFIED LOCATION (HCC): ICD-10-CM

## 2021-03-23 DIAGNOSIS — E55.9 VITAMIN D DEFICIENCY: ICD-10-CM

## 2021-03-23 DIAGNOSIS — K22.70 BARRETT'S ESOPHAGUS WITHOUT DYSPLASIA: ICD-10-CM

## 2021-03-23 DIAGNOSIS — Z79.899 LONG TERM USE OF DRUG: ICD-10-CM

## 2021-03-23 DIAGNOSIS — J30.9 CHRONIC ALLERGIC RHINITIS: ICD-10-CM

## 2021-03-23 DIAGNOSIS — R73.01 IMPAIRED FASTING GLUCOSE: ICD-10-CM

## 2021-03-23 DIAGNOSIS — B02.29 POST HERPETIC NEURALGIA: ICD-10-CM

## 2021-03-23 DIAGNOSIS — K52.9 COLITIS: ICD-10-CM

## 2021-03-23 DIAGNOSIS — K21.9 GASTROESOPHAGEAL REFLUX DISEASE WITHOUT ESOPHAGITIS: ICD-10-CM

## 2021-03-23 DIAGNOSIS — F41.1 GENERALIZED ANXIETY DISORDER: ICD-10-CM

## 2021-03-23 DIAGNOSIS — K52.9 COLITIS: Primary | ICD-10-CM

## 2021-03-23 DIAGNOSIS — F33.1 MODERATE EPISODE OF RECURRENT MAJOR DEPRESSIVE DISORDER (HCC): ICD-10-CM

## 2021-03-23 DIAGNOSIS — M15.9 PRIMARY OSTEOARTHRITIS INVOLVING MULTIPLE JOINTS: ICD-10-CM

## 2021-03-23 DIAGNOSIS — E05.90 HYPERTHYROIDISM: ICD-10-CM

## 2021-03-23 LAB
ALBUMIN SERPL-MCNC: 4.3 G/DL (ref 3.5–5.2)
ALP BLD-CCNC: 58 U/L (ref 35–104)
ALT SERPL-CCNC: 12 U/L (ref 0–32)
ANION GAP SERPL CALCULATED.3IONS-SCNC: 17 MMOL/L (ref 7–16)
AST SERPL-CCNC: 20 U/L (ref 0–31)
BASOPHILS ABSOLUTE: 0.06 E9/L (ref 0–0.2)
BASOPHILS RELATIVE PERCENT: 0.5 % (ref 0–2)
BILIRUB SERPL-MCNC: 0.2 MG/DL (ref 0–1.2)
BILIRUBIN URINE: NEGATIVE
BLOOD, URINE: NEGATIVE
BUN BLDV-MCNC: 7 MG/DL (ref 6–20)
CALCIUM SERPL-MCNC: 8.8 MG/DL (ref 8.6–10.2)
CHLORIDE BLD-SCNC: 103 MMOL/L (ref 98–107)
CHOLESTEROL, FASTING: 210 MG/DL (ref 0–199)
CLARITY: CLEAR
CO2: 19 MMOL/L (ref 22–29)
COLOR: YELLOW
CREAT SERPL-MCNC: 0.9 MG/DL (ref 0.5–1)
EOSINOPHILS ABSOLUTE: 0.35 E9/L (ref 0.05–0.5)
EOSINOPHILS RELATIVE PERCENT: 2.9 % (ref 0–6)
FOLATE: 9 NG/ML (ref 4.8–24.2)
GFR AFRICAN AMERICAN: >60
GFR NON-AFRICAN AMERICAN: >60 ML/MIN/1.73
GLUCOSE BLD-MCNC: 83 MG/DL (ref 74–99)
GLUCOSE URINE: NEGATIVE MG/DL
HCT VFR BLD CALC: 42 % (ref 34–48)
HDLC SERPL-MCNC: 34 MG/DL
HEMOGLOBIN: 13.3 G/DL (ref 11.5–15.5)
IMMATURE GRANULOCYTES #: 0.06 E9/L
IMMATURE GRANULOCYTES %: 0.5 % (ref 0–5)
KETONES, URINE: NEGATIVE MG/DL
LDL CHOLESTEROL CALCULATED: 116 MG/DL (ref 0–99)
LEUKOCYTE ESTERASE, URINE: NEGATIVE
LYMPHOCYTES ABSOLUTE: 3.57 E9/L (ref 1.5–4)
LYMPHOCYTES RELATIVE PERCENT: 29.1 % (ref 20–42)
MAGNESIUM: 2.1 MG/DL (ref 1.6–2.6)
MCH RBC QN AUTO: 32.9 PG (ref 26–35)
MCHC RBC AUTO-ENTMCNC: 31.7 % (ref 32–34.5)
MCV RBC AUTO: 104 FL (ref 80–99.9)
MONOCYTES ABSOLUTE: 0.78 E9/L (ref 0.1–0.95)
MONOCYTES RELATIVE PERCENT: 6.4 % (ref 2–12)
NEUTROPHILS ABSOLUTE: 7.43 E9/L (ref 1.8–7.3)
NEUTROPHILS RELATIVE PERCENT: 60.6 % (ref 43–80)
NITRITE, URINE: NEGATIVE
PDW BLD-RTO: 12.7 FL (ref 11.5–15)
PH UA: 6 (ref 5–9)
PLATELET # BLD: 406 E9/L (ref 130–450)
PMV BLD AUTO: 10.7 FL (ref 7–12)
POTASSIUM SERPL-SCNC: 4 MMOL/L (ref 3.5–5)
PROTEIN UA: NEGATIVE MG/DL
RBC # BLD: 4.04 E12/L (ref 3.5–5.5)
SODIUM BLD-SCNC: 139 MMOL/L (ref 132–146)
SPECIFIC GRAVITY UA: <=1.005 (ref 1–1.03)
TOTAL PROTEIN: 7.2 G/DL (ref 6.4–8.3)
TRIGLYCERIDE, FASTING: 301 MG/DL (ref 0–149)
UROBILINOGEN, URINE: 0.2 E.U./DL
VITAMIN B-12: 755 PG/ML (ref 211–946)
VITAMIN D 25-HYDROXY: 16 NG/ML (ref 30–100)
VLDLC SERPL CALC-MCNC: 60 MG/DL
WBC # BLD: 12.3 E9/L (ref 4.5–11.5)

## 2021-03-23 PROCEDURE — G8427 DOCREV CUR MEDS BY ELIG CLIN: HCPCS | Performed by: INTERNAL MEDICINE

## 2021-03-23 PROCEDURE — 99214 OFFICE O/P EST MOD 30 MIN: CPT | Performed by: INTERNAL MEDICINE

## 2021-03-23 PROCEDURE — G8417 CALC BMI ABV UP PARAM F/U: HCPCS | Performed by: INTERNAL MEDICINE

## 2021-03-23 PROCEDURE — G8484 FLU IMMUNIZE NO ADMIN: HCPCS | Performed by: INTERNAL MEDICINE

## 2021-03-23 PROCEDURE — 1036F TOBACCO NON-USER: CPT | Performed by: INTERNAL MEDICINE

## 2021-03-23 RX ORDER — ALBUTEROL SULFATE 90 UG/1
2 AEROSOL, METERED RESPIRATORY (INHALATION) EVERY 4 HOURS PRN
Qty: 1 INHALER | Refills: 3 | Status: SHIPPED | OUTPATIENT
Start: 2021-03-23

## 2021-03-23 RX ORDER — MONTELUKAST SODIUM 10 MG/1
10 TABLET ORAL NIGHTLY
Qty: 30 TABLET | Refills: 2 | Status: SHIPPED
Start: 2021-03-23 | End: 2021-06-21 | Stop reason: SDUPTHER

## 2021-03-23 RX ORDER — OMEPRAZOLE 40 MG/1
40 CAPSULE, DELAYED RELEASE ORAL DAILY
Qty: 30 CAPSULE | Refills: 0 | Status: SHIPPED
Start: 2021-03-23 | End: 2021-04-27 | Stop reason: SDUPTHER

## 2021-03-23 RX ORDER — DULOXETIN HYDROCHLORIDE 60 MG/1
60 CAPSULE, DELAYED RELEASE ORAL DAILY
Qty: 30 CAPSULE | Refills: 2 | Status: SHIPPED
Start: 2021-03-23 | End: 2021-06-21 | Stop reason: SDUPTHER

## 2021-03-23 RX ORDER — GABAPENTIN 600 MG/1
TABLET ORAL
Qty: 75 TABLET | Refills: 2 | Status: SHIPPED
Start: 2021-03-23 | End: 2021-06-21 | Stop reason: SDUPTHER

## 2021-03-23 RX ORDER — FLUTICASONE PROPIONATE 50 MCG
2 SPRAY, SUSPENSION (ML) NASAL DAILY
Qty: 1 BOTTLE | Refills: 5 | Status: SHIPPED
Start: 2021-03-23 | End: 2021-09-20 | Stop reason: SDUPTHER

## 2021-03-23 RX ORDER — VALACYCLOVIR HYDROCHLORIDE 1 G/1
1000 TABLET, FILM COATED ORAL DAILY
Qty: 30 TABLET | Refills: 2 | Status: SHIPPED
Start: 2021-03-23 | End: 2021-06-21 | Stop reason: SDUPTHER

## 2021-03-23 RX ORDER — CYCLOBENZAPRINE HCL 5 MG
5 TABLET ORAL 3 TIMES DAILY PRN
Qty: 60 TABLET | Refills: 2 | Status: SHIPPED
Start: 2021-03-23 | End: 2021-06-15 | Stop reason: SDUPTHER

## 2021-03-23 RX ORDER — PROMETHAZINE HYDROCHLORIDE 25 MG/1
25 TABLET ORAL EVERY 8 HOURS PRN
Qty: 40 TABLET | Refills: 1 | Status: SHIPPED
Start: 2021-03-23 | End: 2021-06-21 | Stop reason: SDUPTHER

## 2021-03-23 RX ORDER — HYOSCYAMINE SULFATE EXTENDED-RELEASE 0.38 MG/1
TABLET ORAL
COMMUNITY
Start: 2021-02-11 | End: 2021-03-23 | Stop reason: SINTOL

## 2021-03-23 RX ORDER — LORATADINE 10 MG/1
10 TABLET ORAL DAILY PRN
Qty: 30 TABLET | Refills: 2 | Status: SHIPPED
Start: 2021-03-23 | End: 2021-09-20 | Stop reason: SDUPTHER

## 2021-03-23 RX ORDER — ALPRAZOLAM 1 MG/1
1 TABLET ORAL 2 TIMES DAILY
Qty: 60 TABLET | Refills: 2 | Status: SHIPPED | OUTPATIENT
Start: 2021-03-23 | End: 2021-06-21 | Stop reason: SDUPTHER

## 2021-03-23 ASSESSMENT — ENCOUNTER SYMPTOMS
SORE THROAT: 0
NAUSEA: 0
CHEST TIGHTNESS: 0
RHINORRHEA: 0
VOMITING: 0
EYE PAIN: 0
ABDOMINAL PAIN: 0
SHORTNESS OF BREATH: 0
COUGH: 0
CONSTIPATION: 0
BLOOD IN STOOL: 0
DIARRHEA: 0

## 2021-03-23 ASSESSMENT — PATIENT HEALTH QUESTIONNAIRE - PHQ9
2. FEELING DOWN, DEPRESSED OR HOPELESS: 0
SUM OF ALL RESPONSES TO PHQ QUESTIONS 1-9: 0
SUM OF ALL RESPONSES TO PHQ9 QUESTIONS 1 & 2: 0
SUM OF ALL RESPONSES TO PHQ QUESTIONS 1-9: 0

## 2021-03-23 NOTE — PROGRESS NOTES
[facility]) Physicians   Internal Medicine     3/23/2021  Jovana Krueger : 1980 Sex: female  Age:40 y.o. Chief Complaint   Patient presents with    Other     Scheduled 21 for EGD and Colonoscopy. Failed on Hyoscyamine - abdominal craps and Vomiting     Gastroesophageal Reflux    Hyperlipidemia        HPI:   Patient presents to office for evaluation of the following medical concerns. - States had urine infection. Was seen in urgent care. Was given antibiotics. Improved, No dysuria, frequency or urgency. - States shingles has become recurrent. Last exacerbation (2021) - increased valtrx to tid x 1 week. Infectious disease recommended valtrex daily for suppressive treatment. States taking gabapentin 600mg twice a day for post herpetic neuralgia. Not currently following with ID. Stable. - Has impaired fasting glucose. Following with endocrinology. States A1c was good at 5.5 (2020)     - States GERD, states on omeprazole - had EGD and colonoscopy - Sosa esophagus on (2016), follow up 2019 - no [place] - states esophageal spasm and gastritis. Now on perphen-amitriptyline. On phenergan as needed. Offisosorbide as needed. States suggested haldol or Marinol, not covered. Has side effects with haldol, now off. Last GI visit (2021) - plan for EGD and colonoscopy. - States having severe IBS with diarrhea, possible ulcerative colitis. Stopped Lialda. No abdo pain. No current bowel urgency, bleeding. States bloating and gas. No fever or chills. States follows with GI. States on Viberzi twice a day or once a day. Last colonoscopy was 2019 () - continue Viberzi, Phenergan, and hycosamine, plan for EGD and colonoscopy to assess IBS and Sosa's. States stopped perphemazine and amitriptyline and switched to hycosamine. did not tolerate hycoamine stopped and restarted perphenazine/amitriptyline.     - States anxiety is more controlled. On duloxetine and on xanax.  States taking xanax daily and sometimes twice a day. States also helps with sleeping. Did not go to counseling. No Suicidal thoughts. No reported side effects with medications. States meds are helping symptoms.    - States allergy and sinus symptoms have improved. On  Flonase. Stable. States taking loratadine as needed. On Singulair    - States back pain is stable. States comes and goes. States gabapentin has helped. - States now seeing endocrinology for graves disease. States methimazole stopped. State on atenolol 50mg alternating with 25mg. Last TSH was stable. Last endo visit was (11/20) - check US thryoid in 2 years     - States insomnia has improved with xanax and duloxetine    - States had childhood asthma. States singular has helped. States loratadine and ProAir as needed. States no wheezing or SOB. - States has seen gyn and placed on OCP for irregular menstrual cycles, last visit (11/2020)     Health Maintenance   - immunizations:   Influenza Vaccination  - declines  Pneumonia Vaccination  Zoster/Shingles Vaccine  Tetanus Vaccination - (2011)   covid - declined     - Screenings:   Bone Density Scan - (2/8/2017)  Pelvic/Pap Exam - (2017), (2019) - gyn  Mammogram     Colonoscopy - (8/2016) - polyp, hyperplastic, internal hemorrhoids, (1/2019) - tortuous colon, colon spasm, diverticulosis, No UC seen    EGD - (8/2016) - Sosa's esophagus, gastric polyps, (1/2019), Mild to mod chronic active gastritis - esophageal spasm    ROS:  Review of Systems   Constitutional: Negative for appetite change, chills, fever and unexpected weight change. HENT: Negative for congestion, rhinorrhea and sore throat. Eyes: Negative for pain and visual disturbance. Respiratory: Negative for cough, chest tightness and shortness of breath. Cardiovascular: Negative for chest pain and palpitations. Gastrointestinal: Negative for abdominal pain, blood in stool, constipation, diarrhea, nausea and vomiting.    Genitourinary: Negative for difficulty urinating, dysuria, frequency, pelvic pain, urgency and vaginal bleeding. Musculoskeletal: Negative for arthralgias and myalgias. Skin: Negative for rash. Neurological: Negative for dizziness, syncope, weakness, light-headedness, numbness and headaches. Hematological: Negative for adenopathy. Psychiatric/Behavioral: Negative for suicidal ideas. The patient is not nervous/anxious. Current Outpatient Medications:     albuterol sulfate  (90 Base) MCG/ACT inhaler, Inhale 2 puffs into the lungs every 4 hours as needed for Wheezing, Disp: 1 Inhaler, Rfl: 3    ALPRAZolam (XANAX) 1 MG tablet, Take 1 tablet by mouth 2 times daily for 90 days. , Disp: 60 tablet, Rfl: 2    cyclobenzaprine (FLEXERIL) 5 MG tablet, Take 1 tablet by mouth 3 times daily as needed for Muscle spasms, Disp: 60 tablet, Rfl: 2    DULoxetine (CYMBALTA) 60 MG extended release capsule, Take 1 capsule by mouth daily, Disp: 30 capsule, Rfl: 2    fluticasone (FLONASE) 50 MCG/ACT nasal spray, 2 sprays by Nasal route daily, Disp: 1 Bottle, Rfl: 5    gabapentin (NEURONTIN) 600 MG tablet, 1 tab 2-3 times qd, Disp: 75 tablet, Rfl: 2    loratadine (CLARITIN) 10 MG tablet, Take 1 tablet by mouth daily as needed (allergies), Disp: 30 tablet, Rfl: 2    montelukast (SINGULAIR) 10 MG tablet, Take 1 tablet by mouth nightly, Disp: 30 tablet, Rfl: 2    omeprazole (PRILOSEC) 40 MG delayed release capsule, Take 1 capsule by mouth daily, Disp: 30 capsule, Rfl: 0    promethazine (PHENERGAN) 25 MG tablet, Take 1 tablet by mouth every 8 hours as needed for Nausea 1-2 tabs q 4 hrs prn, Disp: 40 tablet, Rfl: 1    valACYclovir (VALTREX) 1 g tablet, Take 1 tablet by mouth daily, Disp: 30 tablet, Rfl: 2    acetaminophen (TYLENOL) 500 MG tablet, Take 500 mg by mouth every 6 hours as needed for Pain, Disp: , Rfl:     amitriptyline (ELAVIL) 10 MG tablet, Take 10 mg by mouth nightly , Disp: , Rfl:     perphenazine 2 MG tablet, Take 2 mg by mouth daily , Disp: , Rfl:     atenolol (TENORMIN) 25 MG tablet, Alternates between 25mg one day and the next 50mg (then repeat), Disp: , Rfl: 0    CAMRESE LO 0.1-0.02 & 0.01 MG TABS, , Disp: , Rfl: 0    VIBERZI 100 MG TABS, 1 tablet 2 times daily. , Disp: , Rfl: 0    Allergies   Allergen Reactions    Latex     Ibuprofen Other (See Comments)     NSAIDs trigger colitis        Past Medical History:   Diagnosis Date    Asthma     Sosa's esophagus without dysplasia 2019    Chronic allergic rhinitis 2019    Colitis 2019    Depression     Esophageal spasm 2019    Gastroesophageal reflux disease without esophagitis 2019    Generalized anxiety disorder 2019    Hyperthyroidism 2019    Impaired fasting glucose 2019    Insomnia     Irritable bowel syndrome     Seasonal allergies     Ulcerative colitis (HCC)        Past Surgical History:   Procedure Laterality Date    NASAL SEPTUM SURGERY      biopsy     WISDOM TOOTH EXTRACTION         Family History   Problem Relation Age of Onset    Osteoarthritis Mother     Elevated Lipids Mother     Heart Disease Father     Other Father         back pain     Elevated Lipids Father        Social History     Socioeconomic History    Marital status:      Spouse name: Not on file    Number of children: Not on file    Years of education: Not on file    Highest education level: Not on file   Occupational History    Not on file   Social Needs    Financial resource strain: Not on file    Food insecurity     Worry: Not on file     Inability: Not on file    Transportation needs     Medical: Not on file     Non-medical: Not on file   Tobacco Use    Smoking status: Former Smoker     Packs/day: 0.50     Years: 1.50     Pack years: 0.75     Types: Cigarettes     Quit date: 1999     Years since quittin.6    Smokeless tobacco: Never Used   Substance and Sexual Activity    Alcohol use: Yes     Comment: socially     Drug use: Not on file    Sexual activity: Not on file   Lifestyle    Physical activity     Days per week: Not on file     Minutes per session: Not on file    Stress: Not on file   Relationships    Social connections     Talks on phone: Not on file     Gets together: Not on file     Attends Islam service: Not on file     Active member of club or organization: Not on file     Attends meetings of clubs or organizations: Not on file     Relationship status: Not on file    Intimate partner violence     Fear of current or ex partner: Not on file     Emotionally abused: Not on file     Physically abused: Not on file     Forced sexual activity: Not on file   Other Topics Concern    Not on file   Social History Narrative    Not on file       Vitals:    03/23/21 1434   BP: 124/84   Pulse: 89   Temp: 97.3 °F (36.3 °C)   SpO2: 98%   Weight: 217 lb (98.4 kg)   Height: 5' 3\" (1.6 m)       Exam:  Physical Exam  Constitutional:       Appearance: She is well-developed. HENT:      Head: Normocephalic and atraumatic. Right Ear: External ear normal.      Left Ear: External ear normal.   Eyes:      Pupils: Pupils are equal, round, and reactive to light. Neck:      Musculoskeletal: Normal range of motion and neck supple. Thyroid: No thyromegaly. Cardiovascular:      Rate and Rhythm: Normal rate and regular rhythm. Heart sounds: Normal heart sounds. No murmur. Pulmonary:      Effort: Pulmonary effort is normal.      Breath sounds: Normal breath sounds. No wheezing. Abdominal:      General: Bowel sounds are normal. There is no distension. Palpations: Abdomen is soft. Tenderness: There is no abdominal tenderness. There is no guarding or rebound. Musculoskeletal: Normal range of motion. Lymphadenopathy:      Cervical: No cervical adenopathy. Skin:     General: Skin is warm and dry. Neurological:      Mental Status: She is alert and oriented to person, place, and time. Cranial Nerves: No cranial nerve deficit. Psychiatric:         Judgment: Judgment normal.         Assessment and Plan:    Diagnoses and all orders for this visit:    Gayle's esophagus without dysplasia  - last EGD 1/2019 - no gayle seen  - on omeprazole  - following with GI  - for follow up EGD     Gastroesophageal reflux disease without esophagitis  - continue present treatment  - watch diet  - on omeprazole   - discussed long term use side effects  - EGD 1/2019, esophageal spasm and gastritis, no gayle  - recommended haldol by GI - did not tolerate side effects   - on perphen-amitriptyline  - stable     Colitis - poss Ulcerative colitis with complication, unspecified location [facility])  - following up with GI  - uncertain etiology   - stopped lialda  - last colonoscopy 2019   - for colonoscopy     Impaired fasting glucose  hyperglycemia  - Follow A1c - last 11/2020 - 5.5     Mixed hyperlipidemia  - watch diet  - calculate 10 year risk   - not on medications at present     Hyperthyroidism  - following with endocrinology  - off methimazole  - on atenolol on alternating doses  - last lab (9/2020) - normal     Moderate episode of recurrent major depressive disorder (HCC)  - off paxil  - on duloxetine given helps also with joint pain at 60mg  - stable     Generalized anxiety disorder  - continue present treatment  - off paxil  - on duloxetine given helps also with joint pain at 60mg  - on xanax  - suggest counseling - declines  - Stable     [place] report reviewed (3/23/2021)  controlled substance agreement updated (2/25/20)    Patient requests benzodiazepine medication refill. I have reviewed the current medications and prior notes regarding the need for these refills. I believe the need for refill is warranted at this time. I have reviewed the [place] report and found no suspicion of drug seeking behavior.  I have discussed the side effects and narcotic/benzodiazepine policy with this patient and the patient has demonstrated understanding. A follow up appointment will be scheduled with me. Controlled Substance Monitoring:    Acute and Chronic Pain Monitoring:   RX Monitoring 3/23/2021   Periodic Controlled Substance Monitoring Possible medication side effects, risk of tolerance/dependence & alternative treatments discussed. ;No signs of potential drug abuse or diversion identified. Esophageal spasm  - EGD (2019) - esophageal spasm and gastritis, no gayle  - recommended haldol by GI - did not tolerate   - concern with antipsychotic, long term use adverse reactions and other drug interactions and uncertain indication. - on perphen-amitriptyline  - unclear if calcium channel blocker is contraindicated  - Marinol not covered    Post herpetic neuralgia  - on gabapentin and valtrex  - Stable    Chronic allergic rhinitis and Mild intermittent asthma without complication  - on singulair  - on flonase   - on antihistamine as needed  - stable     Vitamin D deficiency  - on vitamin D3 2000units daily   - follow labs     Primary osteoarthritis involving multiple joints  - uncertain etiology  - improved on duloxetine  - Stable     Long term use of drug    Return in about 3 months (around 6/23/2021) for check up and review.     Orders Placed This Encounter   Procedures    Comprehensive Metabolic Panel     Standing Status:   Future     Number of Occurrences:   1     Standing Expiration Date:   3/23/2022    Magnesium     Standing Status:   Future     Number of Occurrences:   1     Standing Expiration Date:   3/23/2022    Lipid, Fasting     Standing Status:   Future     Number of Occurrences:   1     Standing Expiration Date:   3/23/2022    Vitamin D 25 Hydroxy     Standing Status:   Future     Number of Occurrences:   1     Standing Expiration Date:   3/23/2022    Urinalysis     Standing Status:   Future     Number of Occurrences:   1     Standing Expiration Date:   3/23/2022    CBC Auto Differential     Standing Status: Future     Number of Occurrences:   1     Standing Expiration Date:   3/23/2022    Vitamin B12 & Folate     Standing Status:   Future     Number of Occurrences:   1     Standing Expiration Date:   3/23/2022     Requested Prescriptions     Signed Prescriptions Disp Refills    albuterol sulfate  (90 Base) MCG/ACT inhaler 1 Inhaler 3     Sig: Inhale 2 puffs into the lungs every 4 hours as needed for Wheezing    ALPRAZolam (XANAX) 1 MG tablet 60 tablet 2     Sig: Take 1 tablet by mouth 2 times daily for 90 days.     cyclobenzaprine (FLEXERIL) 5 MG tablet 60 tablet 2     Sig: Take 1 tablet by mouth 3 times daily as needed for Muscle spasms    DULoxetine (CYMBALTA) 60 MG extended release capsule 30 capsule 2     Sig: Take 1 capsule by mouth daily    fluticasone (FLONASE) 50 MCG/ACT nasal spray 1 Bottle 5     Si sprays by Nasal route daily    gabapentin (NEURONTIN) 600 MG tablet 75 tablet 2     Si tab 2-3 times qd    loratadine (CLARITIN) 10 MG tablet 30 tablet 2     Sig: Take 1 tablet by mouth daily as needed (allergies)    montelukast (SINGULAIR) 10 MG tablet 30 tablet 2     Sig: Take 1 tablet by mouth nightly    omeprazole (PRILOSEC) 40 MG delayed release capsule 30 capsule 0     Sig: Take 1 capsule by mouth daily    promethazine (PHENERGAN) 25 MG tablet 40 tablet 1     Sig: Take 1 tablet by mouth every 8 hours as needed for Nausea 1-2 tabs q 4 hrs prn    valACYclovir (VALTREX) 1 g tablet 30 tablet 2     Sig: Take 1 tablet by mouth daily       Marcelo Lake MD  3/23/2021  4:50 PM

## 2021-03-24 ENCOUNTER — TELEPHONE (OUTPATIENT)
Dept: FAMILY MEDICINE CLINIC | Age: 41
End: 2021-03-24

## 2021-03-24 NOTE — TELEPHONE ENCOUNTER
Please let the patient know that blood work results showed    Cholesterol levels were elevated. Triglyceride levels were elevated. HDL or good cholesterol was lower than recommended.   Will need to consider medications    Bicarbonate level was slightly low which could be related dehydration    Other electrolytes, liver, and kidney function values were normal    Vitamin D levels were still low and similar when compared to previous    White blood cell count was slightly elevated    Hemoglobin levels were normal but size of the red cells were slightly enlarged    Vitamin F11 and folic acid levels were normal    Urinalysis was normal    Thanks

## 2021-04-27 DIAGNOSIS — K22.4 ESOPHAGEAL SPASM: ICD-10-CM

## 2021-04-27 DIAGNOSIS — K21.9 GASTROESOPHAGEAL REFLUX DISEASE WITHOUT ESOPHAGITIS: ICD-10-CM

## 2021-04-27 DIAGNOSIS — K22.70 BARRETT'S ESOPHAGUS WITHOUT DYSPLASIA: ICD-10-CM

## 2021-04-27 RX ORDER — OMEPRAZOLE 40 MG/1
40 CAPSULE, DELAYED RELEASE ORAL DAILY
Qty: 30 CAPSULE | Refills: 5 | Status: SHIPPED
Start: 2021-04-27 | End: 2021-09-20 | Stop reason: SDUPTHER

## 2021-04-27 NOTE — TELEPHONE ENCOUNTER
Last Appointment:  3/23/2021  Future Appointments   Date Time Provider Jeanette Nielsen   6/21/2021  2:30 PM Alma Cervantes  [address] [place] [address]

## 2021-05-07 LAB
ALBUMIN SERPL-MCNC: 4.3 G/DL (ref 3.5–5.2)
ALP BLD-CCNC: 69 U/L (ref 35–104)
ALT SERPL-CCNC: 15 U/L (ref 0–32)
ANION GAP SERPL CALCULATED.3IONS-SCNC: 15 MMOL/L (ref 7–16)
AST SERPL-CCNC: 25 U/L (ref 0–31)
BILIRUB SERPL-MCNC: <0.2 MG/DL (ref 0–1.2)
BUN BLDV-MCNC: 8 MG/DL (ref 6–20)
CALCIUM SERPL-MCNC: 9.4 MG/DL (ref 8.6–10.2)
CHLORIDE BLD-SCNC: 104 MMOL/L (ref 98–107)
CO2: 22 MMOL/L (ref 22–29)
CREAT SERPL-MCNC: 0.9 MG/DL (ref 0.5–1)
GFR AFRICAN AMERICAN: >60
GFR NON-AFRICAN AMERICAN: >60 ML/MIN/1.73
GLUCOSE BLD-MCNC: 112 MG/DL (ref 74–99)
HBA1C MFR BLD: 5.7 % (ref 4–5.6)
POTASSIUM SERPL-SCNC: 3.9 MMOL/L (ref 3.5–5)
SODIUM BLD-SCNC: 141 MMOL/L (ref 132–146)
T4 FREE: 0.94 NG/DL (ref 0.93–1.7)
TOTAL PROTEIN: 7.6 G/DL (ref 6.4–8.3)
TSH SERPL DL<=0.05 MIU/L-ACNC: 3.41 UIU/ML (ref 0.27–4.2)

## 2021-05-10 LAB — INSULIN: 17 UIU/ML

## 2021-05-28 ENCOUNTER — TELEPHONE (OUTPATIENT)
Dept: PRIMARY CARE CLINIC | Age: 41
End: 2021-05-28

## 2021-05-28 DIAGNOSIS — B02.29 POST HERPETIC NEURALGIA: Primary | ICD-10-CM

## 2021-05-28 RX ORDER — VALACYCLOVIR HYDROCHLORIDE 1 G/1
1000 TABLET, FILM COATED ORAL 3 TIMES DAILY
Qty: 21 TABLET | Refills: 0 | Status: SHIPPED | OUTPATIENT
Start: 2021-05-28 | End: 2021-06-04

## 2021-05-28 NOTE — TELEPHONE ENCOUNTER
Pt calling and said she has a flare up of shingles on her left arm. Ephraim Held you will normally send in valtrex TID for her.

## 2021-06-03 ENCOUNTER — TELEPHONE (OUTPATIENT)
Dept: FAMILY MEDICINE CLINIC | Age: 41
End: 2021-06-03

## 2021-06-03 NOTE — TELEPHONE ENCOUNTER
Patient calling will you approve a work excuse for tomorrow? She has a shingles flare. The valtrex is helping. Though she has lethargy fatigue and a headache.

## 2021-06-15 RX ORDER — CYCLOBENZAPRINE HCL 5 MG
5 TABLET ORAL 3 TIMES DAILY PRN
Qty: 60 TABLET | Refills: 2 | Status: SHIPPED
Start: 2021-06-15 | End: 2021-08-16 | Stop reason: SDUPTHER

## 2021-06-21 ENCOUNTER — OFFICE VISIT (OUTPATIENT)
Dept: FAMILY MEDICINE CLINIC | Age: 41
End: 2021-06-21
Payer: COMMERCIAL

## 2021-06-21 VITALS
DIASTOLIC BLOOD PRESSURE: 84 MMHG | HEIGHT: 63 IN | HEART RATE: 101 BPM | SYSTOLIC BLOOD PRESSURE: 130 MMHG | TEMPERATURE: 97.8 F | BODY MASS INDEX: 38.8 KG/M2 | WEIGHT: 219 LBS | OXYGEN SATURATION: 98 %

## 2021-06-21 DIAGNOSIS — E05.90 HYPERTHYROIDISM: ICD-10-CM

## 2021-06-21 DIAGNOSIS — E66.09 CLASS 2 OBESITY DUE TO EXCESS CALORIES WITHOUT SERIOUS COMORBIDITY WITH BODY MASS INDEX (BMI) OF 38.0 TO 38.9 IN ADULT: ICD-10-CM

## 2021-06-21 DIAGNOSIS — K52.9 COLITIS: ICD-10-CM

## 2021-06-21 DIAGNOSIS — F33.1 MODERATE EPISODE OF RECURRENT MAJOR DEPRESSIVE DISORDER (HCC): ICD-10-CM

## 2021-06-21 DIAGNOSIS — B02.29 POST HERPETIC NEURALGIA: ICD-10-CM

## 2021-06-21 DIAGNOSIS — K21.9 GASTROESOPHAGEAL REFLUX DISEASE WITHOUT ESOPHAGITIS: ICD-10-CM

## 2021-06-21 DIAGNOSIS — K51.919 ULCERATIVE COLITIS WITH COMPLICATION, UNSPECIFIED LOCATION (HCC): ICD-10-CM

## 2021-06-21 DIAGNOSIS — R73.01 IMPAIRED FASTING GLUCOSE: ICD-10-CM

## 2021-06-21 DIAGNOSIS — J30.9 CHRONIC ALLERGIC RHINITIS: ICD-10-CM

## 2021-06-21 DIAGNOSIS — K22.4 ESOPHAGEAL SPASM: Primary | ICD-10-CM

## 2021-06-21 DIAGNOSIS — E55.9 VITAMIN D DEFICIENCY: ICD-10-CM

## 2021-06-21 DIAGNOSIS — E78.2 MIXED HYPERLIPIDEMIA: ICD-10-CM

## 2021-06-21 DIAGNOSIS — Z86.19 HX OF HERPES ZOSTER: ICD-10-CM

## 2021-06-21 DIAGNOSIS — M15.9 PRIMARY OSTEOARTHRITIS INVOLVING MULTIPLE JOINTS: ICD-10-CM

## 2021-06-21 DIAGNOSIS — F41.1 GENERALIZED ANXIETY DISORDER: ICD-10-CM

## 2021-06-21 DIAGNOSIS — J45.20 MILD INTERMITTENT ASTHMA WITHOUT COMPLICATION: ICD-10-CM

## 2021-06-21 DIAGNOSIS — K22.70 BARRETT'S ESOPHAGUS WITHOUT DYSPLASIA: ICD-10-CM

## 2021-06-21 PROCEDURE — G8417 CALC BMI ABV UP PARAM F/U: HCPCS | Performed by: INTERNAL MEDICINE

## 2021-06-21 PROCEDURE — 99214 OFFICE O/P EST MOD 30 MIN: CPT | Performed by: INTERNAL MEDICINE

## 2021-06-21 PROCEDURE — G8427 DOCREV CUR MEDS BY ELIG CLIN: HCPCS | Performed by: INTERNAL MEDICINE

## 2021-06-21 PROCEDURE — 1036F TOBACCO NON-USER: CPT | Performed by: INTERNAL MEDICINE

## 2021-06-21 RX ORDER — DULOXETIN HYDROCHLORIDE 60 MG/1
60 CAPSULE, DELAYED RELEASE ORAL DAILY
Qty: 30 CAPSULE | Refills: 2 | Status: SHIPPED
Start: 2021-06-21 | End: 2021-09-20 | Stop reason: SDUPTHER

## 2021-06-21 RX ORDER — VALACYCLOVIR HYDROCHLORIDE 1 G/1
1000 TABLET, FILM COATED ORAL DAILY
Qty: 30 TABLET | Refills: 2 | Status: SHIPPED
Start: 2021-06-21 | End: 2021-09-20 | Stop reason: SDUPTHER

## 2021-06-21 RX ORDER — MONTELUKAST SODIUM 10 MG/1
10 TABLET ORAL NIGHTLY
Qty: 30 TABLET | Refills: 2 | Status: SHIPPED
Start: 2021-06-21 | End: 2021-09-20 | Stop reason: SDUPTHER

## 2021-06-21 RX ORDER — GABAPENTIN 600 MG/1
TABLET ORAL
Qty: 75 TABLET | Refills: 2 | Status: SHIPPED
Start: 2021-06-21 | End: 2021-09-20 | Stop reason: SDUPTHER

## 2021-06-21 RX ORDER — PROMETHAZINE HYDROCHLORIDE 25 MG/1
25 TABLET ORAL EVERY 8 HOURS PRN
Qty: 40 TABLET | Refills: 2 | Status: SHIPPED
Start: 2021-06-21 | End: 2021-08-31 | Stop reason: SDUPTHER

## 2021-06-21 RX ORDER — LEVOTHYROXINE SODIUM 75 UG/1
75 CAPSULE ORAL DAILY
COMMUNITY

## 2021-06-21 RX ORDER — ALPRAZOLAM 1 MG/1
1 TABLET ORAL 2 TIMES DAILY
Qty: 60 TABLET | Refills: 2 | Status: SHIPPED | OUTPATIENT
Start: 2021-06-21 | End: 2021-09-20 | Stop reason: SDUPTHER

## 2021-06-21 ASSESSMENT — ENCOUNTER SYMPTOMS
COUGH: 0
VOMITING: 0
BLOOD IN STOOL: 0
NAUSEA: 0
CONSTIPATION: 0
DIARRHEA: 0
RHINORRHEA: 0
EYE PAIN: 0
SHORTNESS OF BREATH: 0
ABDOMINAL PAIN: 0
CHEST TIGHTNESS: 0
SORE THROAT: 0

## 2021-06-21 NOTE — PROGRESS NOTES
[facility]) Physicians   Internal Medicine     2021  [facility] : 1980 Sex: female  Age:40 y.o. Chief Complaint   Patient presents with    3 Month Follow-Up    Herpes Zoster     6/3   Reita Reshma' Disease     Endocrinology started Levothyroxine 50mcg qd         HPI:   Patient presents to office for evaluation of the following medical concerns. - States shingles has become recurrent. Last exacerbation (2021) - increased valtrx to tid x 1 week, back to daily. Infectious disease recommended valtrex daily for suppressive treatment. States taking gabapentin 600mg twice a day for post herpetic neuralgia. Not currently following with ID. Stable. - Has impaired fasting glucose. Following with endocrinology. States A1c was good at 5.7 (2021)     - States GERD, states on omeprazole - had EGD and colonoscopy - Sosa esophagus on (2016), follow up 2019 - no Sosa - states esophageal spasm and gastritis. Now on perphen-amitriptyline. On phenergan as needed. Offisosorbide as needed. States suggested haldol or Marinol, not covered. Has side effects with haldol, now off. EGD () - gastric polyps, otherwise normal, path fundic gland polyp    - States having severe IBS with diarrhea, possible ulcerative colitis. Stopped Lialda. No abdo pain. No current bowel urgency, bleeding. States bloating and gas. No fever or chills. States follows with GI. States on Viberzi twice a day or once a day. Last colonoscopy () - normal colonoscopy, int hemorrhoids, path - colon mucosa w no signicant inflammation. continue Viberzi, Phenergan, perphemazine and amitriptyline. Stable     - States anxiety is more controlled. On duloxetine and on xanax. States taking xanax daily and sometimes twice a day. States also helps with sleeping. Did not go to counseling. No Suicidal thoughts. No reported side effects with medications.  States meds are helping symptoms.    - States allergy and sinus symptoms have diarrhea, nausea and vomiting. Genitourinary: Negative for difficulty urinating, dysuria, frequency, pelvic pain, urgency and vaginal bleeding. Musculoskeletal: Negative for arthralgias and myalgias. Skin: Negative for rash. Neurological: Negative for dizziness, syncope, weakness, light-headedness, numbness and headaches. Hematological: Negative for adenopathy. Psychiatric/Behavioral: Negative for suicidal ideas. The patient is not nervous/anxious. Current Outpatient Medications:     levothyroxine (SYNTHROID) 50 MCG tablet, Take 50 mcg by mouth Daily, Disp: , Rfl:     ALPRAZolam (XANAX) 1 MG tablet, Take 1 tablet by mouth 2 times daily for 90 days. , Disp: 60 tablet, Rfl: 2    DULoxetine (CYMBALTA) 60 MG extended release capsule, Take 1 capsule by mouth daily, Disp: 30 capsule, Rfl: 2    gabapentin (NEURONTIN) 600 MG tablet, 1 tab 2-3 times qd, Disp: 75 tablet, Rfl: 2    montelukast (SINGULAIR) 10 MG tablet, Take 1 tablet by mouth nightly, Disp: 30 tablet, Rfl: 2    valACYclovir (VALTREX) 1 g tablet, Take 1 tablet by mouth daily, Disp: 30 tablet, Rfl: 2    promethazine (PHENERGAN) 25 MG tablet, Take 1 tablet by mouth every 8 hours as needed for Nausea 1-2 tabs q 4 hrs prn, Disp: 40 tablet, Rfl: 2    cyclobenzaprine (FLEXERIL) 5 MG tablet, Take 1 tablet by mouth 3 times daily as needed for Muscle spasms, Disp: 60 tablet, Rfl: 2    omeprazole (PRILOSEC) 40 MG delayed release capsule, Take 1 capsule by mouth daily, Disp: 30 capsule, Rfl: 5    albuterol sulfate  (90 Base) MCG/ACT inhaler, Inhale 2 puffs into the lungs every 4 hours as needed for Wheezing, Disp: 1 Inhaler, Rfl: 3    fluticasone (FLONASE) 50 MCG/ACT nasal spray, 2 sprays by Nasal route daily, Disp: 1 Bottle, Rfl: 5    loratadine (CLARITIN) 10 MG tablet, Take 1 tablet by mouth daily as needed (allergies), Disp: 30 tablet, Rfl: 2    acetaminophen (TYLENOL) 500 MG tablet, Take 500 mg by mouth every 6 hours as needed for Pain, Disp: , Rfl:     amitriptyline (ELAVIL) 10 MG tablet, Take 10 mg by mouth nightly , Disp: , Rfl:     perphenazine 2 MG tablet, Take 2 mg by mouth daily , Disp: , Rfl:     atenolol (TENORMIN) 25 MG tablet, Alternates between 25mg one day and the next 50mg (then repeat), Disp: , Rfl: 0    CAMRESE LO 0.1-0.02 & 0.01 MG TABS, , Disp: , Rfl: 0    VIBERZI 100 MG TABS, 1 tablet 2 times daily. , Disp: , Rfl: 0    Allergies   Allergen Reactions    Latex     Ibuprofen Other (See Comments)     NSAIDs trigger colitis        Past Medical History:   Diagnosis Date    Asthma     Sosa's esophagus without dysplasia 2019    Chronic allergic rhinitis 2019    Colitis 2019    Depression     Esophageal spasm 2019    Gastroesophageal reflux disease without esophagitis 2019    Generalized anxiety disorder 2019    Hyperthyroidism 2019    Impaired fasting glucose 2019    Insomnia     Irritable bowel syndrome     Seasonal allergies     Ulcerative colitis (HCC)        Past Surgical History:   Procedure Laterality Date    NASAL SEPTUM SURGERY      biopsy     WISDOM TOOTH EXTRACTION         Family History   Problem Relation Age of Onset    Osteoarthritis Mother     Elevated Lipids Mother     Heart Disease Father     Other Father         back pain     Elevated Lipids Father        Social History     Socioeconomic History    Marital status:      Spouse name: Not on file    Number of children: Not on file    Years of education: Not on file    Highest education level: Not on file   Occupational History    Not on file   Tobacco Use    Smoking status: Former Smoker     Packs/day: 0.50     Years: 1.50     Pack years: 0.75     Types: Cigarettes     Quit date: 1999     Years since quittin.9    Smokeless tobacco: Never Used   Substance and Sexual Activity    Alcohol use: Yes     Comment: socially     Drug use: Not on file    Sexual activity: Not on file   Other Topics Concern    Not on file   Social History Narrative    Not on file     Social Determinants of Health     Financial Resource Strain:     Difficulty of Paying Living Expenses:    Food Insecurity:     Worried About Running Out of Food in the Last Year:     [address] in the Last Year:    Transportation Needs:     Lack of Transportation (Medical):  Lack of Transportation (Non-Medical):    Physical Activity:     Days of Exercise per Week:     Minutes of Exercise per Session:    Stress:     Feeling of Stress :    Social Connections:     Frequency of Communication with Friends and Family:     Frequency of Social Gatherings with Friends and Family:     Attends Jainism Services:     Active Member of Clubs or Organizations:     Attends Club or Organization Meetings:     Marital Status:    Intimate Partner Violence:     Fear of Current or Ex-Partner:     Emotionally Abused:     Physically Abused:     Sexually Abused:        Vitals:    06/21/21 1426   BP: 130/84   Site: Left Upper Arm   Position: Sitting   Cuff Size: Large Adult   Pulse: 101   Temp: 97.8 °F (36.6 °C)   SpO2: 98%   Weight: 219 lb (99.3 kg)   Height: 5' 3\" (1.6 m)       Exam:  Physical Exam  Constitutional:       Appearance: She is well-developed. HENT:      Head: Normocephalic and atraumatic. Right Ear: External ear normal.      Left Ear: External ear normal.   Eyes:      Pupils: Pupils are equal, round, and reactive to light. Neck:      Thyroid: No thyromegaly. Cardiovascular:      Rate and Rhythm: Normal rate and regular rhythm. Heart sounds: Normal heart sounds. No murmur heard. Pulmonary:      Effort: Pulmonary effort is normal.      Breath sounds: Normal breath sounds. No wheezing. Abdominal:      General: Bowel sounds are normal. There is no distension. Palpations: Abdomen is soft. Tenderness: There is no abdominal tenderness. There is no guarding or rebound.    Musculoskeletal: General: Normal range of motion. Cervical back: Normal range of motion and neck supple. Lymphadenopathy:      Cervical: No cervical adenopathy. Skin:     General: Skin is warm and dry. Neurological:      Mental Status: She is alert and oriented to person, place, and time. Cranial Nerves: No cranial nerve deficit.    Psychiatric:         Judgment: Judgment normal.         Assessment and Plan:    Diagnoses and all orders for this visit:    Gayle's esophagus without dysplasia  - last EGD 4/2021- no gayle seen, gastric polyps  - on omeprazole  - following with GI  - stable     Gastroesophageal reflux disease without esophagitis  - watch diet  - on omeprazole   - discussed long term use side effects  - recommended haldol by GI - did not tolerate side effects   - on perphen-amitriptyline for spasm   - last EGD 4/2021- no gayle seen, gastric polyps  - stable     Colitis - poss Ulcer[facility] with complication[facility]  - following up with GI  - uncertain etiology   - stopped lialda  - last colonoscopy (4/21) - normal colonoscopy, int hemorrhoids, path - colon mucosa w no signicant inflammation    Impaired fasting glucose  hyperglycemia  - Follow A1c - last 5/2021 - 5.7     Mixed hyperlipidemia  - watch diet  - calculate 10 year risk   - not on medications at present   - may need to consider medications     Hyperthyroidism  - following with endocrinology  - off methimazole  - on atenolol on alternating doses  - last lab (5/2021) - normal but was having symptoms, stared on levothyroxine 50mcg  - to have follow up labs     Moderate episode of recurrent major depressive disorder ([facility]  - off paxil  - on duloxetine given helps also with joint pain at 60mg  - stable     Generalized anxiety disorder  - off paxil  - on duloxetine given helps also with joint pain at 60mg  - on xanax  - suggest counseling - declines  - Stable     [place] report reviewed (6/21/2021)  controlled substance agreement updated (2/25/20)    Patient requests benzodiazepine medication refill. I have reviewed the current medications and prior notes regarding the need for these refills. I believe the need for refill is warranted at this time. I have reviewed the [place] report and found no suspicion of drug seeking behavior. I have discussed the side effects and narcotic/benzodiazepine policy with this patient and the patient has demonstrated understanding. A follow up appointment will be scheduled with me. Controlled Substance Monitoring:    Acute and Chronic Pain Monitoring:   RX Monitoring 6/21/2021   Periodic Controlled Substance Monitoring Possible medication side effects, risk of tolerance/dependence & alternative treatments discussed. ;No signs of potential drug abuse or diversion identified. Esophageal spasm  - EGD (2019) - esophageal spasm and gastritis, no gayle  - recommended haldol by GI - did not tolerate   - concern with antipsychotic, long term use adverse reactions and other drug interactions and uncertain indication. - on perphen-amitriptyline  - unclear if calcium channel blocker is contraindicated  - Marinol not covered    Post herpetic neuralgia  - on gabapentin and valtrex  - Stable    Hx of herpes zoster  - recurrent   - last exacerbation (6/2021)   - on valtrex daily suppressive     Chronic allergic rhinitis and Mild intermittent asthma without complication  - on singulair  - on flonase   - on antihistamine as needed  - stable     Vitamin D deficiency  - on vitamin D3 2000units daily   - follow labs     Primary osteoarthritis involving multiple joints  - uncertain etiology  - improved on duloxetine  - Stable     Long term use of drug    Return in about 3 months (around 9/21/2021) for check up and review.     Orders Placed This Encounter   Procedures    Amb Referral to Nutrition Services     Referral Priority:   Routine     Referral Type:   Consult for Advice and Opinion     Number of Visits Requested:   1     Requested Prescriptions     Signed Prescriptions Disp Refills    ALPRAZolam (XANAX) 1 MG tablet 60 tablet 2     Sig: Take 1 tablet by mouth 2 times daily for 90 days.     DULoxetine (CYMBALTA) 60 MG extended release capsule 30 capsule 2     Sig: Take 1 capsule by mouth daily    gabapentin (NEURONTIN) 600 MG tablet 75 tablet 2     Si tab 2-3 times qd    montelukast (SINGULAIR) 10 MG tablet 30 tablet 2     Sig: Take 1 tablet by mouth nightly    valACYclovir (VALTREX) 1 g tablet 30 tablet 2     Sig: Take 1 tablet by mouth daily    promethazine (PHENERGAN) 25 MG tablet 40 tablet 2     Sig: Take 1 tablet by mouth every 8 hours as needed for Nausea 1-2 tabs q 4 hrs prn       Lelo Ruano MD  2021  3:09 PM

## 2021-07-13 LAB
T4 FREE: 1.01 NG/DL (ref 0.93–1.7)
TSH SERPL DL<=0.05 MIU/L-ACNC: 0.78 UIU/ML (ref 0.27–4.2)

## 2021-07-14 ENCOUNTER — TELEPHONE (OUTPATIENT)
Dept: FAMILY MEDICINE CLINIC | Age: 41
End: 2021-07-14

## 2021-07-14 NOTE — TELEPHONE ENCOUNTER
Please let the patient know that blood work results showed    Thyroid stimulating hormone (TSH) and free T4 levels were in normal limits    TSH was on the lower limits of normal on the overactive and but otherwise was normal and would continue present dose of medication    Thanks

## 2021-08-16 RX ORDER — CYCLOBENZAPRINE HCL 5 MG
5 TABLET ORAL 3 TIMES DAILY PRN
Qty: 60 TABLET | Refills: 2 | Status: SHIPPED
Start: 2021-08-16 | End: 2021-10-14 | Stop reason: SDUPTHER

## 2021-08-16 NOTE — TELEPHONE ENCOUNTER
Last Appointment:  6/21/2021  Future Appointments   Date Time Provider Jeanette Nielsen   9/20/2021  3:45 PM Kameron Zazueta  [address] [facility] [address]

## 2021-08-24 LAB
ALBUMIN SERPL-MCNC: 3.9 G/DL (ref 3.5–5.2)
ALP BLD-CCNC: 62 U/L (ref 35–104)
ALT SERPL-CCNC: 24 U/L (ref 0–32)
ANION GAP SERPL CALCULATED.3IONS-SCNC: 14 MMOL/L (ref 7–16)
AST SERPL-CCNC: 34 U/L (ref 0–31)
BILIRUB SERPL-MCNC: <0.2 MG/DL (ref 0–1.2)
BUN BLDV-MCNC: 6 MG/DL (ref 6–20)
CALCIUM SERPL-MCNC: 8.8 MG/DL (ref 8.6–10.2)
CHLORIDE BLD-SCNC: 105 MMOL/L (ref 98–107)
CO2: 19 MMOL/L (ref 22–29)
CREAT SERPL-MCNC: 0.8 MG/DL (ref 0.5–1)
GFR AFRICAN AMERICAN: >60
GFR NON-AFRICAN AMERICAN: >60 ML/MIN/1.73
GLUCOSE BLD-MCNC: 138 MG/DL (ref 74–99)
HBA1C MFR BLD: 5.9 % (ref 4–5.6)
POTASSIUM SERPL-SCNC: 4.1 MMOL/L (ref 3.5–5)
SODIUM BLD-SCNC: 138 MMOL/L (ref 132–146)
T3 FREE: 3.2 PG/ML (ref 2–4.4)
T3 TOTAL: 169.1 NG/DL (ref 80–200)
T4 FREE: 1.12 NG/DL (ref 0.93–1.7)
TOTAL PROTEIN: 7.2 G/DL (ref 6.4–8.3)
TSH SERPL DL<=0.05 MIU/L-ACNC: 0.32 UIU/ML (ref 0.27–4.2)

## 2021-08-31 DIAGNOSIS — K21.9 GASTROESOPHAGEAL REFLUX DISEASE WITHOUT ESOPHAGITIS: ICD-10-CM

## 2021-08-31 DIAGNOSIS — K22.70 BARRETT'S ESOPHAGUS WITHOUT DYSPLASIA: ICD-10-CM

## 2021-08-31 RX ORDER — PROMETHAZINE HYDROCHLORIDE 25 MG/1
25 TABLET ORAL EVERY 8 HOURS PRN
Qty: 40 TABLET | Refills: 2 | Status: SHIPPED
Start: 2021-08-31 | End: 2021-11-06 | Stop reason: SDUPTHER

## 2021-08-31 NOTE — TELEPHONE ENCOUNTER
Last Appointment:  6/21/2021  Future Appointments   Date Time Provider Jeanette Nielsen   9/20/2021  3:45 PM Promise Ferrer  [address]

## 2021-09-20 ENCOUNTER — OFFICE VISIT (OUTPATIENT)
Dept: FAMILY MEDICINE CLINIC | Age: 41
End: 2021-09-20
Payer: COMMERCIAL

## 2021-09-20 VITALS
DIASTOLIC BLOOD PRESSURE: 92 MMHG | OXYGEN SATURATION: 99 % | HEIGHT: 63 IN | TEMPERATURE: 98.9 F | BODY MASS INDEX: 39.38 KG/M2 | WEIGHT: 222.25 LBS | HEART RATE: 106 BPM | SYSTOLIC BLOOD PRESSURE: 138 MMHG

## 2021-09-20 DIAGNOSIS — K22.4 ESOPHAGEAL SPASM: ICD-10-CM

## 2021-09-20 DIAGNOSIS — B02.29 POST HERPETIC NEURALGIA: ICD-10-CM

## 2021-09-20 DIAGNOSIS — F41.1 GENERALIZED ANXIETY DISORDER: ICD-10-CM

## 2021-09-20 DIAGNOSIS — K21.9 GASTROESOPHAGEAL REFLUX DISEASE WITHOUT ESOPHAGITIS: ICD-10-CM

## 2021-09-20 DIAGNOSIS — Z12.31 ENCOUNTER FOR SCREENING MAMMOGRAM FOR MALIGNANT NEOPLASM OF BREAST: Primary | ICD-10-CM

## 2021-09-20 DIAGNOSIS — J30.9 CHRONIC ALLERGIC RHINITIS: ICD-10-CM

## 2021-09-20 DIAGNOSIS — M15.9 PRIMARY OSTEOARTHRITIS INVOLVING MULTIPLE JOINTS: ICD-10-CM

## 2021-09-20 DIAGNOSIS — F33.1 MODERATE EPISODE OF RECURRENT MAJOR DEPRESSIVE DISORDER (HCC): ICD-10-CM

## 2021-09-20 DIAGNOSIS — E05.90 HYPERTHYROIDISM: ICD-10-CM

## 2021-09-20 DIAGNOSIS — K22.70 BARRETT'S ESOPHAGUS WITHOUT DYSPLASIA: ICD-10-CM

## 2021-09-20 PROCEDURE — 1036F TOBACCO NON-USER: CPT | Performed by: INTERNAL MEDICINE

## 2021-09-20 PROCEDURE — G8417 CALC BMI ABV UP PARAM F/U: HCPCS | Performed by: INTERNAL MEDICINE

## 2021-09-20 PROCEDURE — G8427 DOCREV CUR MEDS BY ELIG CLIN: HCPCS | Performed by: INTERNAL MEDICINE

## 2021-09-20 PROCEDURE — 99214 OFFICE O/P EST MOD 30 MIN: CPT | Performed by: INTERNAL MEDICINE

## 2021-09-20 RX ORDER — VALACYCLOVIR HYDROCHLORIDE 1 G/1
1000 TABLET, FILM COATED ORAL DAILY
Qty: 30 TABLET | Refills: 2 | Status: SHIPPED
Start: 2021-09-20 | End: 2021-12-13 | Stop reason: SDUPTHER

## 2021-09-20 RX ORDER — OMEPRAZOLE 40 MG/1
40 CAPSULE, DELAYED RELEASE ORAL DAILY
Qty: 30 CAPSULE | Refills: 5 | Status: SHIPPED
Start: 2021-09-20 | End: 2022-03-08 | Stop reason: SDUPTHER

## 2021-09-20 RX ORDER — LORATADINE 10 MG/1
10 TABLET ORAL DAILY PRN
Qty: 30 TABLET | Refills: 2 | Status: SHIPPED
Start: 2021-09-20 | End: 2022-02-01 | Stop reason: SDUPTHER

## 2021-09-20 RX ORDER — FLUTICASONE PROPIONATE 50 MCG
2 SPRAY, SUSPENSION (ML) NASAL DAILY
Qty: 1 EACH | Refills: 2 | Status: SHIPPED
Start: 2021-09-20 | End: 2021-12-13 | Stop reason: SDUPTHER

## 2021-09-20 RX ORDER — GABAPENTIN 600 MG/1
TABLET ORAL
Qty: 75 TABLET | Refills: 2 | Status: SHIPPED
Start: 2021-09-20 | End: 2021-12-13 | Stop reason: SDUPTHER

## 2021-09-20 RX ORDER — DULOXETIN HYDROCHLORIDE 60 MG/1
60 CAPSULE, DELAYED RELEASE ORAL DAILY
Qty: 30 CAPSULE | Refills: 2 | Status: SHIPPED
Start: 2021-09-20 | End: 2021-12-13 | Stop reason: SDUPTHER

## 2021-09-20 RX ORDER — MONTELUKAST SODIUM 10 MG/1
10 TABLET ORAL NIGHTLY
Qty: 30 TABLET | Refills: 2 | Status: SHIPPED
Start: 2021-09-20 | End: 2021-12-13 | Stop reason: SDUPTHER

## 2021-09-20 RX ORDER — ALPRAZOLAM 1 MG/1
1 TABLET ORAL 2 TIMES DAILY
Qty: 60 TABLET | Refills: 2 | Status: SHIPPED | OUTPATIENT
Start: 2021-09-20 | End: 2021-12-13 | Stop reason: SDUPTHER

## 2021-09-20 SDOH — ECONOMIC STABILITY: FOOD INSECURITY: WITHIN THE PAST 12 MONTHS, YOU WORRIED THAT YOUR FOOD WOULD RUN OUT BEFORE YOU GOT MONEY TO BUY MORE.: NEVER TRUE

## 2021-09-20 SDOH — ECONOMIC STABILITY: FOOD INSECURITY: WITHIN THE PAST 12 MONTHS, THE FOOD YOU BOUGHT JUST DIDN'T LAST AND YOU DIDN'T HAVE MONEY TO GET MORE.: NEVER TRUE

## 2021-09-20 ASSESSMENT — ENCOUNTER SYMPTOMS
EYE PAIN: 0
DIARRHEA: 0
VOMITING: 0
COUGH: 0
SORE THROAT: 0
CHEST TIGHTNESS: 0
BLOOD IN STOOL: 0
ABDOMINAL PAIN: 0
NAUSEA: 0
SHORTNESS OF BREATH: 0
RHINORRHEA: 0
CONSTIPATION: 0

## 2021-09-20 ASSESSMENT — SOCIAL DETERMINANTS OF HEALTH (SDOH): HOW HARD IS IT FOR YOU TO PAY FOR THE VERY BASICS LIKE FOOD, HOUSING, MEDICAL CARE, AND HEATING?: NOT HARD AT ALL

## 2021-09-20 NOTE — PROGRESS NOTES
[facility]) Physicians   Internal Medicine     2021  Joce Cruz : 1980 Sex: female  Age:41 y.o. Chief Complaint   Patient presents with    3 Month Follow-Up    Gastroesophageal Reflux     Barretts    Hyperlipidemia    Hyperthyroidism     Would like to review lab results from Aug    Anxiety     Increased anxiety         HPI:   Patient presents to office for evaluation of the following medical concerns. - States anxiety is more uncontrolled. On duloxetine and on xanax. States increased taking xanax twice a day, asking for further increase. States also helps with sleeping. Did not go to counseling. No Suicidal thoughts. No reported side effects with medications. States meds are helping symptoms.    - States shingles, recurrent. Last exacerbation (2021) - increased valtrx to tid x 1 week, back to daily. Infectious disease recommended valtrex daily for suppressive treatment. States taking gabapentin 600mg twice a day for post herpetic neuralgia. Not currently following with ID. Stable. - Has impaired fasting glucose. Following with endocrinology. States A1c was good at 5.9 (2021)     - States GERD, states on omeprazole - had EGD and colonoscopy - Sosa esophagus on (2016), follow up 2019 - no Sosa - states esophageal spasm and gastritis. Now on perphen-amitriptyline. On phenergan as needed. EGD () - gastric polyps, otherwise normal, path fundic gland polyp    - States having severe IBS with diarrhea, possible ulcerative colitis. Stopped Lialda. No abdo pain. No current bowel urgency, bleeding. States bloating and gas. No fever or chills. States follows with GI. States on Viberzi twice a day or once a day. Last colonoscopy () - normal colonoscopy, int hemorrhoids, path - colon mucosa w no signicant inflammation. continue Viberzi, Phenergan, perphemazine and amitriptyline. Stable     - States allergy and sinus symptoms have improved and stable. On  Flonase.  On Singulair States taking loratadine as needed. - States back pain is stable. States comes and goes. States gabapentin has helped. - States following with endocrinology for graves disease. States methimazole stopped. State on atenolol 50mg alternating with 25mg. Started on levothyroxine 50mcg daily. Last endo visit per reviewed note (8/21) -Graves' disease and thyroid nodules thyroid labs normal ultrasound stable, impaired fasting glucose A1c 5.7, follow-up with labs 2/2022.      - States insomnia has improved with xanax and duloxetine    - States had childhood asthma. States singular has helped. States loratadine and ProAir as needed. States no wheezing or SOB. - States has seen gyn and placed on OCP for irregular menstrual cycles, last visit (11/2020)     Health Maintenance   - immunizations:   Influenza Vaccination  - declines  Pneumonia Vaccination  Zoster/Shingles Vaccine  Tetanus Vaccination - (2011)   covid - declined     - Screenings:   Bone Density Scan - (2/8/2017)  Pelvic/Pap Exam - (2017), (2019) - gyn  Mammogram     Colonoscopy - (8/2016) - polyp, hyperplastic, internal hemorrhoids, (1/2019) - tortuous colon, colon spasm, diverticulosis, No UC seen, (4/21) - normal colonoscopy, int hemorrhoids, path - colon mucosa w no signicant inflammation     EGD - (8/2016) - Sosa's esophagus, gastric polyps, (1/2019), Mild to mod chronic active gastritis - esophageal spasm, (4/21) - gastric polyps, otherwise normal, path fundic gland polyp    ROS:  Review of Systems   Constitutional: Negative for appetite change, chills, fever and unexpected weight change. HENT: Negative for congestion, rhinorrhea and sore throat. Eyes: Negative for pain and visual disturbance. Respiratory: Negative for cough, chest tightness and shortness of breath. Cardiovascular: Negative for chest pain and palpitations.    Gastrointestinal: Negative for abdominal pain, blood in stool, constipation, diarrhea, nausea and vomiting. Genitourinary: Negative for difficulty urinating, dysuria, frequency, pelvic pain, urgency and vaginal bleeding. Musculoskeletal: Negative for arthralgias and myalgias. Skin: Negative for rash. Neurological: Negative for dizziness, syncope, weakness, light-headedness, numbness and headaches. Hematological: Negative for adenopathy. Psychiatric/Behavioral: Negative for suicidal ideas. The patient is not nervous/anxious. Current Outpatient Medications:     valACYclovir (VALTREX) 1 g tablet, Take 1 tablet by mouth daily, Disp: 30 tablet, Rfl: 2    omeprazole (PRILOSEC) 40 MG delayed release capsule, Take 1 capsule by mouth daily, Disp: 30 capsule, Rfl: 5    montelukast (SINGULAIR) 10 MG tablet, Take 1 tablet by mouth nightly, Disp: 30 tablet, Rfl: 2    loratadine (CLARITIN) 10 MG tablet, Take 1 tablet by mouth daily as needed (allergies), Disp: 30 tablet, Rfl: 2    gabapentin (NEURONTIN) 600 MG tablet, 1 tab 2-3 times qd, Disp: 75 tablet, Rfl: 2    DULoxetine (CYMBALTA) 60 MG extended release capsule, Take 1 capsule by mouth daily, Disp: 30 capsule, Rfl: 2    fluticasone (FLONASE) 50 MCG/ACT nasal spray, 2 sprays by Nasal route daily, Disp: 1 each, Rfl: 2    ALPRAZolam (XANAX) 1 MG tablet, Take 1 tablet by mouth 2 times daily for 90 days. , Disp: 60 tablet, Rfl: 2    promethazine (PHENERGAN) 25 MG tablet, Take 1 tablet by mouth every 8 hours as needed for Nausea 1-2 tabs q 4 hrs prn, Disp: 40 tablet, Rfl: 2    cyclobenzaprine (FLEXERIL) 5 MG tablet, Take 1 tablet by mouth 3 times daily as needed for Muscle spasms, Disp: 60 tablet, Rfl: 2    levothyroxine (SYNTHROID) 50 MCG tablet, Take 50 mcg by mouth Daily, Disp: , Rfl:     albuterol sulfate  (90 Base) MCG/ACT inhaler, Inhale 2 puffs into the lungs every 4 hours as needed for Wheezing, Disp: 1 Inhaler, Rfl: 3    acetaminophen (TYLENOL) 500 MG tablet, Take 500 mg by mouth every 6 hours as needed for Pain, Disp: , Rfl:   amitriptyline (ELAVIL) 10 MG tablet, Take 10 mg by mouth nightly , Disp: , Rfl:     perphenazine 2 MG tablet, Take 2 mg by mouth daily , Disp: , Rfl:     atenolol (TENORMIN) 25 MG tablet, Alternates between 25mg one day and the next 50mg (then repeat), Disp: , Rfl: 0    CAMRESE LO 0.1-0.02 & 0.01 MG TABS, , Disp: , Rfl: 0    VIBERZI 100 MG TABS, 1 tablet 2 times daily. , Disp: , Rfl: 0    Allergies   Allergen Reactions    Latex     Ibuprofen Other (See Comments)     NSAIDs trigger colitis        Past Medical History:   Diagnosis Date    Asthma     Sosa's esophagus without dysplasia 2019    Chronic allergic rhinitis 2019    Colitis 2019    Depression     Esophageal spasm 2019    Gastroesophageal reflux disease without esophagitis 2019    Generalized anxiety disorder 2019    Hyperthyroidism 2019    Impaired fasting glucose 2019    Insomnia     Irritable bowel syndrome     Seasonal allergies     Ulcerative colitis (HCC)        Past Surgical History:   Procedure Laterality Date    NASAL SEPTUM SURGERY      biopsy     WISDOM TOOTH EXTRACTION         Family History   Problem Relation Age of Onset    Osteoarthritis Mother     Elevated Lipids Mother     Heart Disease Father     Other Father         back pain     Elevated Lipids Father        Social History     Socioeconomic History    Marital status:      Spouse name: Not on file    Number of children: Not on file    Years of education: Not on file    Highest education level: Not on file   Occupational History    Not on file   Tobacco Use    Smoking status: Former Smoker     Packs/day: 0.50     Years: 1.50     Pack years: 0.75     Types: Cigarettes     Quit date: 1999     Years since quittin.1    Smokeless tobacco: Never Used   Substance and Sexual Activity    Alcohol use: Yes     Comment: socially     Drug use: Not on file    Sexual activity: Not on file   Other Topics Concern    Not on file   Social History Narrative    Not on file     Social Determinants of Health     Financial Resource Strain: Low Risk     Difficulty of Paying Living Expenses: Not hard at all   Food Insecurity: No Food Insecurity    Worried About Running Out of Food in the Last Year: Never true    [address] in the Last Year: Never true   Transportation Needs:     Lack of Transportation (Medical):  Lack of Transportation (Non-Medical):    Physical Activity:     Days of Exercise per Week:     Minutes of Exercise per Session:    Stress:     Feeling of Stress :    Social Connections:     Frequency of Communication with Friends and Family:     Frequency of Social Gatherings with Friends and Family:     Attends L[employer] Services:     Active Member of Clubs or Organizations:     Attends Club or Organization Meetings:     Marital Status:    Intimate Partner Violence:     Fear of Current or Ex-Partner:     Emotionally Abused:     Physically Abused:     Sexually Abused:        Vitals:    09/20/21 1545 09/20/21 1555   BP: (!) 154/90 (!) 138/92   Site: Left Upper Arm Left Upper Arm   Position: Sitting Sitting   Cuff Size: Large Adult Large Adult   Pulse: 106    Temp: 98.9 °F (37.2 °C)    SpO2: 99%    Weight: 222 lb 4 oz (100.8 kg)    Height: 5' 3\" (1.6 m)        Exam:  Physical Exam  Constitutional:       Appearance: She is well-developed. HENT:      Head: Normocephalic and atraumatic. Right Ear: External ear normal.      Left Ear: External ear normal.   Eyes:      Pupils: Pupils are equal, round, and reactive to light. Neck:      Thyroid: No thyromegaly. Cardiovascular:      Rate and Rhythm: Normal rate and regular rhythm. Heart sounds: Normal heart sounds. No murmur heard. Pulmonary:      Effort: Pulmonary effort is normal.      Breath sounds: Normal breath sounds. No wheezing. Abdominal:      General: Bowel sounds are normal. There is no distension.       Palpations: Abdomen is soft. Tenderness: There is no abdominal tenderness. There is no guarding or rebound. Musculoskeletal:         General: Normal range of motion. Cervical back: Normal range of motion and neck supple. Lymphadenopathy:      Cervical: No cervical adenopathy. Skin:     General: Skin is warm and dry. Neurological:      Mental Status: She is alert and oriented to person, place, and time. Cranial Nerves: No cranial nerve deficit. Psychiatric:         Judgment: Judgment normal.         Assessment and Plan:    Diagnoses and all orders for this visit:      Moderate episode of recurrent major depressive disorder (HCC)  - off paxil  - on duloxetine given helps also with joint pain at 60mg  - stable     Generalized anxiety disorder  - off paxil  - on duloxetine given helps also with joint pain at 60mg  - on xanax. Now increased to twice a day, asking for furthe rincrease   - suggest counseling - declines  - uncontrolled   - recommend referral - placed     [place] report reviewed (9/20/2021)  controlled substance agreement updated (2/25/20)    Patient requests benzodiazepine medication refill. I have reviewed the current medications and prior notes regarding the need for these refills. I believe the need for refill is warranted at this time. I have reviewed the [place] report and found no suspicion of drug seeking behavior. I have discussed the side effects and narcotic/benzodiazepine policy with this patient and the patient has demonstrated understanding. A follow up appointment will be scheduled with me    Controlled Substance Monitoring:    Acute and Chronic Pain Monitoring:   RX Monitoring 9/20/2021   Periodic Controlled Substance Monitoring Possible medication side effects, risk of tolerance/dependence & alternative treatments discussed. ;No signs of potential drug abuse or diversion identified.;Obtaining appropriate analgesic effect of treatment.        Sosa's esophagus without dysplasia  - last EGD 4/2021- no gayle seen, gastric polyps  - on omeprazole  - following with GI  - stable     Gastroesophageal reflux disease without esophagitis  - watch diet  - on omeprazole   - discussed long term use side effects  - recommended haldol by GI - did not tolerate side effects   - on perphen-amitriptyline for spasm   - last EGD 4/2021- no gayle seen, gastric polyps  - stable     Colitis - poss Ulcerative colitis with complication, unspecified location [facility])  - following up with GI  - uncertain etiology   - stopped lialda  - last colonoscopy (4/21) - normal colonoscopy, int hemorrhoids, path - colon mucosa w no signicant inflammation    Impaired fasting glucose  hyperglycemia  - Follow A1c - last 5/2021 - 5.7     Mixed hyperlipidemia  - watch diet  - calculate 10 year risk   - not on medications at present   - may need to consider medications     Hyperthyroidism  - following with endocrinology  - off methimazole  - on atenolol on alternating doses  - last lab (5/2021) - normal but was having symptoms, stared on levothyroxine 50mcg  - to have follow up labs     Esophageal spasm  - EGD (2019) - esophageal spasm and gastritis, no gayle  - recommended haldol by GI - did not tolerate   - concern with antipsychotic, long term use adverse reactions and other drug interactions and uncertain indication.   - on perphen-amitriptyline  - unclear if calcium channel blocker is contraindicated  - Marinol not covered    Post herpetic neuralgia  - on gabapentin and valtrex  - Stable    Hx of herpes zoster  - recurrent   - last exacerbation (6/2021)   - on valtrex daily suppressive     Chronic allergic rhinitis and Mild intermittent asthma without complication  - on singulair  - on flonase   - on antihistamine as needed  - stable     Vitamin D deficiency  - on vitamin D3 2000units daily   - follow labs     Primary osteoarthritis involving multiple joints  - uncertain etiology  - improved on duloxetine  - Stable     Long term use of drug    Return in about 3 months (around 2021) for check up and review. Orders Placed This Encounter   Procedures    JENNIFER MISAEL DIGITAL SCREEN BILATERAL     PER [address] PROTOCOL     Standing Status:   Future     Standing Expiration Date:   2022     Order Specific Question:   Reason for exam:     Answer:   screening mammogram    External Referral To Psychiatry     Referral Priority:   Routine     Referral Type:   Eval and Treat     Referral Reason:   Specialty Services Required     Referred to Provider:   Cedrick Smith MD     Requested Specialty:   Psychiatry     Number of Visits Requested:   1     Requested Prescriptions     Signed Prescriptions Disp Refills    valACYclovir (VALTREX) 1 g tablet 30 tablet 2     Sig: Take 1 tablet by mouth daily    omeprazole (PRILOSEC) 40 MG delayed release capsule 30 capsule 5     Sig: Take 1 capsule by mouth daily    montelukast (SINGULAIR) 10 MG tablet 30 tablet 2     Sig: Take 1 tablet by mouth nightly    loratadine (CLARITIN) 10 MG tablet 30 tablet 2     Sig: Take 1 tablet by mouth daily as needed (allergies)    gabapentin (NEURONTIN) 600 MG tablet 75 tablet 2     Si tab 2-3 times qd    DULoxetine (CYMBALTA) 60 MG extended release capsule 30 capsule 2     Sig: Take 1 capsule by mouth daily    fluticasone (FLONASE) 50 MCG/ACT nasal spray 1 each 2     Si sprays by Nasal route daily    ALPRAZolam (XANAX) 1 MG tablet 60 tablet 2     Sig: Take 1 tablet by mouth 2 times daily for 90 days.        Bernardo Albarado MD  2021  4:20 PM

## 2021-10-14 RX ORDER — CYCLOBENZAPRINE HCL 5 MG
5 TABLET ORAL 3 TIMES DAILY PRN
Qty: 60 TABLET | Refills: 2 | Status: SHIPPED
Start: 2021-10-14 | End: 2021-12-13 | Stop reason: SDUPTHER

## 2021-10-14 NOTE — TELEPHONE ENCOUNTER
Last Appointment:  9/20/2021  Future Appointments   Date Time Provider Jeanette Nielsen   12/9/2021  3:30 PM Dot Ray  [address] [place] [address]

## 2021-10-23 ENCOUNTER — TELEPHONE (OUTPATIENT)
Dept: PRIMARY CARE CLINIC | Age: 41
End: 2021-10-23

## 2021-10-27 ENCOUNTER — OFFICE VISIT (OUTPATIENT)
Dept: PRIMARY CARE CLINIC | Age: 41
End: 2021-10-27
Payer: COMMERCIAL

## 2021-10-27 VITALS
OXYGEN SATURATION: 98 % | DIASTOLIC BLOOD PRESSURE: 80 MMHG | WEIGHT: 225 LBS | TEMPERATURE: 98 F | RESPIRATION RATE: 18 BRPM | BODY MASS INDEX: 39.87 KG/M2 | SYSTOLIC BLOOD PRESSURE: 132 MMHG | HEIGHT: 63 IN | HEART RATE: 140 BPM

## 2021-10-27 DIAGNOSIS — B02.31 HERPES ZOSTER CONJUNCTIVITIS: Primary | ICD-10-CM

## 2021-10-27 PROCEDURE — G8484 FLU IMMUNIZE NO ADMIN: HCPCS | Performed by: FAMILY MEDICINE

## 2021-10-27 PROCEDURE — 1036F TOBACCO NON-USER: CPT | Performed by: FAMILY MEDICINE

## 2021-10-27 PROCEDURE — G8427 DOCREV CUR MEDS BY ELIG CLIN: HCPCS | Performed by: FAMILY MEDICINE

## 2021-10-27 PROCEDURE — G8417 CALC BMI ABV UP PARAM F/U: HCPCS | Performed by: FAMILY MEDICINE

## 2021-10-27 PROCEDURE — 99214 OFFICE O/P EST MOD 30 MIN: CPT | Performed by: FAMILY MEDICINE

## 2021-10-27 RX ORDER — PREDNISONE 10 MG/1
TABLET ORAL
Qty: 30 TABLET | Refills: 0 | Status: SHIPPED
Start: 2021-10-27 | End: 2021-12-13 | Stop reason: ALTCHOICE

## 2021-10-27 RX ORDER — VALACYCLOVIR HYDROCHLORIDE 1 G/1
1000 TABLET, FILM COATED ORAL 3 TIMES DAILY
Qty: 21 TABLET | Refills: 0 | Status: SHIPPED
Start: 2021-10-27 | End: 2021-11-01

## 2021-10-27 NOTE — LETTER
[address]  Phone: [phone]  Fax: [phone]    Christina Jeter MD        October 27, 2021     Patient: Anatoly Montelongo   YOB: 1980   Date of Visit: 10/27/2021       To Whom it May Concern:    Anatoly Montelongo was seen in my clinic on 10/27/2021. She should be excused from work today. If you have any questions or concerns, please don't hesitate to call.     Sincerely,         Christina Jeter MD

## 2021-10-27 NOTE — PROGRESS NOTES
10/27/21  Glenna Lane : 1980 Sex: female  Age: 35 y.o. Assessment and Plan:  Johanna Jose was seen today for rash. Diagnoses and all orders for this visit:    Herpes zoster conjunctivitis    Other orders  -     valACYclovir (VALTREX) 1 g tablet; Take 1 tablet by mouth 3 times daily for 7 days  -     predniSONE (DELTASONE) 10 MG tablet; 4 tabs daily x 3 days, then 3 tabs daily x 3 days, then 2 tabs daily x 3 days, then 1 tab daily x 3 days    Start treatment dose Valtrex and prednisone  Refer to optho for immediate evaluation of right eye - pt will see Dr. Momo Mosquera at [place] [id number] today   She is to contact her PCP for acute f/u    Return if symptoms worsen or fail to improve. Chief Complaint   Patient presents with    Rash       HPI  Pt here for acute concerns    She has h/o post-herpetic neuralgia and is on Valtrex daily for suppression   She developed rash right scalp that started about a week ago   Initially was one spot, but now has spread   It's painful, tingling and burning  Feels like Shingles to her    Jeannie Toby she started with some right eye dryness, gritty   Constantly oozing   This morning was red, crusted over   +blurry vision   +irritation   Used some tobramycin with dexamethasone yesterday that helped a bit    Problem list reviewed and updated in full with patient today as necessary.         Current Outpatient Medications:     valACYclovir (VALTREX) 1 g tablet, Take 1 tablet by mouth 3 times daily for 7 days, Disp: 21 tablet, Rfl: 0    predniSONE (DELTASONE) 10 MG tablet, 4 tabs daily x 3 days, then 3 tabs daily x 3 days, then 2 tabs daily x 3 days, then 1 tab daily x 3 days, Disp: 30 tablet, Rfl: 0    cyclobenzaprine (FLEXERIL) 5 MG tablet, Take 1 tablet by mouth 3 times daily as needed for Muscle spasms, Disp: 60 tablet, Rfl: 2    valACYclovir (VALTREX) 1 g tablet, Take 1 tablet by mouth daily, Disp: 30 tablet, Rfl: 2    omeprazole (PRILOSEC) 40 MG delayed release capsule, Take 1 capsule by mouth daily, Disp: 30 capsule, Rfl: 5    montelukast (SINGULAIR) 10 MG tablet, Take 1 tablet by mouth nightly, Disp: 30 tablet, Rfl: 2    loratadine (CLARITIN) 10 MG tablet, Take 1 tablet by mouth daily as needed (allergies), Disp: 30 tablet, Rfl: 2    gabapentin (NEURONTIN) 600 MG tablet, 1 tab 2-3 times qd, Disp: 75 tablet, Rfl: 2    DULoxetine (CYMBALTA) 60 MG extended release capsule, Take 1 capsule by mouth daily, Disp: 30 capsule, Rfl: 2    fluticasone (FLONASE) 50 MCG/ACT nasal spray, 2 sprays by Nasal route daily, Disp: 1 each, Rfl: 2    ALPRAZolam (XANAX) 1 MG tablet, Take 1 tablet by mouth 2 times daily for 90 days. , Disp: 60 tablet, Rfl: 2    promethazine (PHENERGAN) 25 MG tablet, Take 1 tablet by mouth every 8 hours as needed for Nausea 1-2 tabs q 4 hrs prn, Disp: 40 tablet, Rfl: 2    levothyroxine (SYNTHROID) 50 MCG tablet, Take 50 mcg by mouth Daily, Disp: , Rfl:     albuterol sulfate  (90 Base) MCG/ACT inhaler, Inhale 2 puffs into the lungs every 4 hours as needed for Wheezing, Disp: 1 Inhaler, Rfl: 3    acetaminophen (TYLENOL) 500 MG tablet, Take 500 mg by mouth every 6 hours as needed for Pain, Disp: , Rfl:     amitriptyline (ELAVIL) 10 MG tablet, Take 10 mg by mouth nightly , Disp: , Rfl:     perphenazine 2 MG tablet, Take 2 mg by mouth daily , Disp: , Rfl:     atenolol (TENORMIN) 25 MG tablet, Alternates between 25mg one day and the next 50mg (then repeat), Disp: , Rfl: 0    CAMRESE LO 0.1-0.02 & 0.01 MG TABS, , Disp: , Rfl: 0    VIBERZI 100 MG TABS, 1 tablet 2 times daily. , Disp: , Rfl: 0  Allergies   Allergen Reactions    Latex     Ibuprofen Other (See Comments)     NSAIDs trigger colitis        Pt's past medical and surgical history were reviewed and updated as necessary today   Pt's family and social history were reviewed and updated as necessary today          Vitals:    10/27/21 0833   BP: 132/80   Pulse: 140   Resp: 18   Temp: 98 °F (36.7 °C)   TempSrc: Temporal   SpO2: 98%   Weight: 225 lb (102.1 kg)   Height: 5' 3\" (1.6 m)       Physical Exam  Constitutional:       Appearance: Normal appearance. HENT:      Head: Normocephalic and atraumatic. Comments: Right upper forehead back through hair line show discrete erythematous ulcerated lesions. No surrounding skin erythema. Eyes:      Conjunctiva/sclera:      Right eye: Right conjunctiva is injected. Comments: Right eye with some periorbital puffiness and conjunctival erythema    Cardiovascular:      Rate and Rhythm: Normal rate. Pulmonary:      Effort: Pulmonary effort is normal.   Abdominal:      Palpations: Abdomen is soft. Tenderness: There is no abdominal tenderness. Musculoskeletal:         General: Normal range of motion. Skin:     General: Skin is warm and dry. Neurological:      General: No focal deficit present. Mental Status: She is alert and oriented to person, place, and time. Psychiatric:         Mood and Affect: Mood normal.         Behavior: Behavior normal.          Counseled patient as appropriate and relevant regarding above diagnosis, including possible risks and complications, especially if left uncontrolled. Counseled patient as appropriate and relevant regarding any  possible side effects, risks, and alternatives to treatment; patient and/or guardian verbalizes understanding, and is in agreement with the plan as detailed above. Reviewed age and gender appropriate health screening exams and vaccinations. Advised patient regarding importance of keeping up with recommended health maintenance and to schedule as soon as possible if overdue, as this is important in assessing for undiagnosed pathology, especially cancer, as well as protecting against potentially harmful/life threatening disease.       If discussed, any educational materials and/or home exercises printed for patient's review and were included in patient instructions on his/her After Visit Summary and given to patient at the end of visit. Advised patient to call with any new medication issues, and and other concerns/complaints prior to scheduled follow up. All questions answered to the patient's satisfaction.         Seen By:  Christian Rivera MD

## 2021-11-01 RX ORDER — VALACYCLOVIR HYDROCHLORIDE 1 G/1
TABLET, FILM COATED ORAL
Qty: 21 TABLET | Refills: 0 | Status: SHIPPED
Start: 2021-11-01 | End: 2021-12-13 | Stop reason: ALTCHOICE

## 2021-11-06 DIAGNOSIS — K21.9 GASTROESOPHAGEAL REFLUX DISEASE WITHOUT ESOPHAGITIS: ICD-10-CM

## 2021-11-06 DIAGNOSIS — K22.70 BARRETT'S ESOPHAGUS WITHOUT DYSPLASIA: ICD-10-CM

## 2021-11-08 RX ORDER — PROMETHAZINE HYDROCHLORIDE 25 MG/1
25 TABLET ORAL EVERY 8 HOURS PRN
Qty: 40 TABLET | Refills: 2 | Status: SHIPPED
Start: 2021-11-08 | End: 2022-02-01 | Stop reason: SDUPTHER

## 2021-11-08 NOTE — TELEPHONE ENCOUNTER
Last Appointment:  9/20/2021  Future Appointments   Date Time Provider Jeanette Nielsen   12/9/2021  3:30 PM Brionna Luque  [address] [place] [address]

## 2021-11-23 ENCOUNTER — PATIENT MESSAGE (OUTPATIENT)
Dept: FAMILY MEDICINE CLINIC | Age: 41
End: 2021-11-23

## 2021-11-23 DIAGNOSIS — U07.1 COVID: Primary | ICD-10-CM

## 2021-11-23 NOTE — TELEPHONE ENCOUNTER
From: Anatoly Montelongo  To: Dr. Marietta Michelle  Sent: 11/23/2021 1:26 PM EST  Subject: Test Results Question    Neri Maddox I wanted to let you know that I tested positive for covid yesterday on rapid test. 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 I have many symptoms sore throat, cough, wheezing, headache, terrible body aches, tiredness, loss of smell and taste, fevered with chills. I'm taking tylenol every 6 hours. I wanted to touch base with you if there is something else I should or shouldn't be doing. Also, with all my autoimmune issues what you advise me to do as far as quarantine time? When to get retested and certain type? I appreciate your advice. Thank you!

## 2021-11-24 RX ORDER — METHYLPREDNISOLONE 4 MG/1
TABLET ORAL
Qty: 1 KIT | Refills: 0 | Status: SHIPPED | OUTPATIENT
Start: 2021-11-24 | End: 2021-11-30

## 2021-11-24 NOTE — TELEPHONE ENCOUNTER
I would like pred rx phoned into [facility] in [place]. If could get methylpred instead of pred. Recently on pred gave me severe pain with BM and bleeding. If  Prefers pred I want to do what's best.  Also, I am interested in antibody infusion. If you can refer me to [place] since they're the closest. I don't think I could drive to [place] and  has covid too. Thank you for all of your help as always. I hope you all have a nice Thanksgiving!

## 2021-11-24 NOTE — TELEPHONE ENCOUNTER
Faxed order to [facility] for the infusion. Pt was provided the phone number to call and schedule. She is requesting methylpred instead of prednisone. She would like the Rx sent to [facility]. Please advise.

## 2021-12-01 ENCOUNTER — OFFICE VISIT (OUTPATIENT)
Dept: FAMILY MEDICINE CLINIC | Age: 41
End: 2021-12-01
Payer: COMMERCIAL

## 2021-12-01 VITALS
RESPIRATION RATE: 20 BRPM | BODY MASS INDEX: 39.34 KG/M2 | TEMPERATURE: 96.8 F | DIASTOLIC BLOOD PRESSURE: 90 MMHG | WEIGHT: 222 LBS | OXYGEN SATURATION: 97 % | HEART RATE: 136 BPM | SYSTOLIC BLOOD PRESSURE: 148 MMHG | HEIGHT: 63 IN

## 2021-12-01 DIAGNOSIS — R52 BODY ACHES: ICD-10-CM

## 2021-12-01 DIAGNOSIS — R43.0 LOSS OF SMELL: ICD-10-CM

## 2021-12-01 DIAGNOSIS — R05.9 COUGH: Primary | ICD-10-CM

## 2021-12-01 DIAGNOSIS — R43.2 LOSS OF TASTE: ICD-10-CM

## 2021-12-01 DIAGNOSIS — Z20.822 EXPOSURE TO COVID-19 VIRUS: ICD-10-CM

## 2021-12-01 LAB
Lab: NORMAL
PERFORMING INSTRUMENT: NORMAL
QC PASS/FAIL: NORMAL
SARS-COV-2, POC: NORMAL

## 2021-12-01 PROCEDURE — 87426 SARSCOV CORONAVIRUS AG IA: CPT | Performed by: PHYSICIAN ASSISTANT

## 2021-12-01 PROCEDURE — G8427 DOCREV CUR MEDS BY ELIG CLIN: HCPCS | Performed by: PHYSICIAN ASSISTANT

## 2021-12-01 PROCEDURE — 99203 OFFICE O/P NEW LOW 30 MIN: CPT | Performed by: PHYSICIAN ASSISTANT

## 2021-12-01 PROCEDURE — G8417 CALC BMI ABV UP PARAM F/U: HCPCS | Performed by: PHYSICIAN ASSISTANT

## 2021-12-01 PROCEDURE — 1036F TOBACCO NON-USER: CPT | Performed by: PHYSICIAN ASSISTANT

## 2021-12-01 PROCEDURE — G8484 FLU IMMUNIZE NO ADMIN: HCPCS | Performed by: PHYSICIAN ASSISTANT

## 2021-12-01 RX ORDER — POLYMYXIN B SULFATE AND TRIMETHOPRIM 1; 10000 MG/ML; [USP'U]/ML
SOLUTION OPHTHALMIC
COMMUNITY
Start: 2021-10-27 | End: 2021-12-13 | Stop reason: ALTCHOICE

## 2021-12-01 ASSESSMENT — ENCOUNTER SYMPTOMS
SORE THROAT: 0
VOMITING: 0
BACK PAIN: 0
DIARRHEA: 0
COUGH: 1
PHOTOPHOBIA: 0
NAUSEA: 0
SHORTNESS OF BREATH: 0
ABDOMINAL PAIN: 0

## 2021-12-01 NOTE — PROGRESS NOTES
21  Dominique Loera : 1980 Sex: female  Age 35 y.o. Subjective:  Chief Complaint   Patient presents with    Cough    Generalized Body Aches    Other     loss of taste and smell          64-year-old female with a history of Sosa's esophagus, colitis, GERD and esophageal spasm presents to the walk-in clinic for evaluation of head congestion, cough and loss of taste and smell that started a few days ago. She states that there have been positive Covid cases at work and states she would have been exposed about 10 days ago. She is taking over-the-counter Tylenol. She denies any fever or chills. No nausea or vomiting. No shortness of breath or chest pain. No hemoptysis. Patient denies chance of pregnancy. She takes oral contraceptive pills. Her last menstrual cycle was about 1 month ago. Review of Systems   Constitutional: Negative for chills and fever. HENT: Positive for congestion. Negative for ear pain and sore throat. Loss of taste and smell   Eyes: Negative for photophobia and visual disturbance. Respiratory: Positive for cough. Negative for shortness of breath. Cardiovascular: Negative for chest pain. Gastrointestinal: Negative for abdominal pain, diarrhea, nausea and vomiting. Genitourinary: Negative for difficulty urinating, dysuria, frequency and urgency. Musculoskeletal: Positive for myalgias. Negative for back pain, neck pain and neck stiffness. Skin: Negative for rash. Neurological: Negative for dizziness, syncope, weakness, light-headedness and headaches. Hematological: Negative for adenopathy. Does not bruise/bleed easily. Psychiatric/Behavioral: Negative for agitation and confusion. All other systems reviewed and are negative.         PMH:     Past Medical History:   Diagnosis Date    Asthma     Sosa's esophagus without dysplasia 2019    Chronic allergic rhinitis 2019    Colitis 2019    Depression     Esophageal spasm 7/23/2019    Gastroesophageal reflux disease without esophagitis 7/23/2019    Generalized anxiety disorder 7/23/2019    Hyperthyroidism 7/23/2019    Impaired fasting glucose 7/23/2019    Insomnia     Irritable bowel syndrome     Seasonal allergies     Ulcerative colitis ([facility] [address]        Past Surgical History:   Procedure Laterality Date    NASAL SEPTUM SURGERY      biopsy     WISDOM TOOTH EXTRACTION         Family History   Problem Relation Age of Onset    Osteoarthritis Mother     Elevated Lipids Mother     Heart Disease Father     Other Father         back pain     Elevated Lipids Father     Colon Cancer Father     Colon Cancer Maternal Grandmother     Breast Cancer Maternal Aunt     Breast Cancer Paternal Aunt        Medications:     Current Outpatient Medications:     trimethoprim-polymyxin b (POLYTRIM) 65085-7.1 UNIT/ML-% ophthalmic solution, instill 1 drop INTO AFFECTED EYE(S) four times a day, Disp: , Rfl:     promethazine (PHENERGAN) 25 MG tablet, Take 1 tablet by mouth every 8 hours as needed for Nausea 1-2 tabs q 4 hrs prn, Disp: 40 tablet, Rfl: 2    valACYclovir (VALTREX) 1 g tablet, take 1 tablet by mouth three times a day, Disp: 21 tablet, Rfl: 0    predniSONE (DELTASONE) 10 MG tablet, 4 tabs daily x 3 days, then 3 tabs daily x 3 days, then 2 tabs daily x 3 days, then 1 tab daily x 3 days, Disp: 30 tablet, Rfl: 0    cyclobenzaprine (FLEXERIL) 5 MG tablet, Take 1 tablet by mouth 3 times daily as needed for Muscle spasms, Disp: 60 tablet, Rfl: 2    valACYclovir (VALTREX) 1 g tablet, Take 1 tablet by mouth daily, Disp: 30 tablet, Rfl: 2    omeprazole (PRILOSEC) 40 MG delayed release capsule, Take 1 capsule by mouth daily, Disp: 30 capsule, Rfl: 5    montelukast (SINGULAIR) 10 MG tablet, Take 1 tablet by mouth nightly, Disp: 30 tablet, Rfl: 2    loratadine (CLARITIN) 10 MG tablet, Take 1 tablet by mouth daily as needed (allergies), Disp: 30 tablet, Rfl: 2    gabapentin (NEURONTIN) 600 MG tablet, 1 tab 2-3 times qd, Disp: 75 tablet, Rfl: 2    DULoxetine (CYMBALTA) 60 MG extended release capsule, Take 1 capsule by mouth daily, Disp: 30 capsule, Rfl: 2    fluticasone (FLONASE) 50 MCG/ACT nasal spray, 2 sprays by Nasal route daily, Disp: 1 each, Rfl: 2    ALPRAZolam (XANAX) 1 MG tablet, Take 1 tablet by mouth 2 times daily for 90 days. , Disp: 60 tablet, Rfl: 2    levothyroxine (SYNTHROID) 50 MCG tablet, Take 50 mcg by mouth Daily, Disp: , Rfl:     albuterol sulfate  (90 Base) MCG/ACT inhaler, Inhale 2 puffs into the lungs every 4 hours as needed for Wheezing, Disp: 1 Inhaler, Rfl: 3    acetaminophen (TYLENOL) 500 MG tablet, Take 500 mg by mouth every 6 hours as needed for Pain, Disp: , Rfl:     amitriptyline (ELAVIL) 10 MG tablet, Take 10 mg by mouth nightly , Disp: , Rfl:     perphenazine 2 MG tablet, Take 2 mg by mouth daily , Disp: , Rfl:     atenolol (TENORMIN) 25 MG tablet, Alternates between 25mg one day and the next 50mg (then repeat), Disp: , Rfl: 0    CAMRESE LO 0.1-0.02 & 0.01 MG TABS, , Disp: , Rfl: 0    VIBERZI 100 MG TABS, 1 tablet 2 times daily. , Disp: , Rfl: 0    Allergies: Allergies   Allergen Reactions    Latex     Ibuprofen Other (See Comments)     NSAIDs trigger colitis        Social History:     Social History     Tobacco Use    Smoking status: Former Smoker     Packs/day: 0.50     Years: 1.50     Pack years: 0.75     Types: Cigarettes     Quit date: 1999     Years since quittin.3    Smokeless tobacco: Never Used   Substance Use Topics    Alcohol use: Yes     Comment: socially     Drug use: Not on file       Patient lives at home. Physical Exam:     Vitals:    21 1214 21 1216   BP: (!) 148/90 (!) 148/90   Pulse: 136    Resp: 20    Temp: 96.8 °F (36 °C)    TempSrc: Temporal    SpO2: 97%    Weight: 222 lb (100.7 kg)    Height: 5' 3\" (1.6 m)        Exam:  Physical Exam  Vitals and nursing note reviewed. with the above complaint of cough and congestion. Rapid antigen for Covid is negative. COVID-19 PCR is pending. Differential diagnosis was discussed. At this point she will quarantine until we have the results of her Covid test back. The patient is to treat her symptoms with over-the-counter analgesics and decongestants. She is to maintain good oral intake. If positive for COVID-19 she is to isolate for 10 days after the onset of her symptoms as well as remain fever free for 24 hours with her other symptoms improving until she is able to return to work duties. Patient understands plan is agreeable. We discussed signs and symptoms that would warrant emergent evaluation the emergency department. Patient will follow up as needed with her PCP. Clinical Impression:   Rosa Castro was seen today for cough, generalized body aches and other. Diagnoses and all orders for this visit:    Cough  -     POCT COVID-19, Antigen  -     COVID-19 Ambulatory; Future    Exposure to COVID-19 virus    Body aches    Loss of taste    Loss of smell        The patient is to call for any concerns or return if any of the signs or symptoms worsen. The patient is to follow-up with PCP in the next 2-3 days for repeat evaluation repeat assessment or go directly to the emergency department. SIGNATURE: Diana Bonilla PA-C

## 2021-12-02 DIAGNOSIS — R05.9 COUGH: ICD-10-CM

## 2021-12-03 LAB
SARS-COV-2: NOT DETECTED
SOURCE: NORMAL

## 2021-12-13 ENCOUNTER — OFFICE VISIT (OUTPATIENT)
Dept: FAMILY MEDICINE CLINIC | Age: 41
End: 2021-12-13
Payer: COMMERCIAL

## 2021-12-13 ENCOUNTER — TELEPHONE (OUTPATIENT)
Dept: ADMINISTRATIVE | Age: 41
End: 2021-12-13

## 2021-12-13 VITALS
DIASTOLIC BLOOD PRESSURE: 100 MMHG | SYSTOLIC BLOOD PRESSURE: 142 MMHG | HEART RATE: 131 BPM | OXYGEN SATURATION: 100 % | WEIGHT: 230 LBS | BODY MASS INDEX: 40.75 KG/M2 | TEMPERATURE: 98.2 F | HEIGHT: 63 IN

## 2021-12-13 DIAGNOSIS — U07.1 COVID-19: Primary | ICD-10-CM

## 2021-12-13 DIAGNOSIS — E55.9 VITAMIN D DEFICIENCY: ICD-10-CM

## 2021-12-13 DIAGNOSIS — R00.0 TACHYCARDIA: ICD-10-CM

## 2021-12-13 DIAGNOSIS — E05.90 HYPERTHYROIDISM: ICD-10-CM

## 2021-12-13 DIAGNOSIS — F33.1 MODERATE EPISODE OF RECURRENT MAJOR DEPRESSIVE DISORDER (HCC): ICD-10-CM

## 2021-12-13 DIAGNOSIS — E78.2 MIXED HYPERLIPIDEMIA: ICD-10-CM

## 2021-12-13 DIAGNOSIS — R73.01 IMPAIRED FASTING GLUCOSE: ICD-10-CM

## 2021-12-13 DIAGNOSIS — M15.9 PRIMARY OSTEOARTHRITIS INVOLVING MULTIPLE JOINTS: ICD-10-CM

## 2021-12-13 DIAGNOSIS — R21 RASH AND NONSPECIFIC SKIN ERUPTION: ICD-10-CM

## 2021-12-13 DIAGNOSIS — B02.29 POST HERPETIC NEURALGIA: ICD-10-CM

## 2021-12-13 DIAGNOSIS — K52.9 COLITIS: ICD-10-CM

## 2021-12-13 DIAGNOSIS — K51.919 ULCERATIVE COLITIS WITH COMPLICATION, UNSPECIFIED LOCATION (HCC): ICD-10-CM

## 2021-12-13 DIAGNOSIS — F41.1 GENERALIZED ANXIETY DISORDER: ICD-10-CM

## 2021-12-13 DIAGNOSIS — K21.9 GASTROESOPHAGEAL REFLUX DISEASE WITHOUT ESOPHAGITIS: ICD-10-CM

## 2021-12-13 DIAGNOSIS — J30.9 CHRONIC ALLERGIC RHINITIS: ICD-10-CM

## 2021-12-13 DIAGNOSIS — K22.70 BARRETT'S ESOPHAGUS WITHOUT DYSPLASIA: ICD-10-CM

## 2021-12-13 DIAGNOSIS — K08.89 PAIN, DENTAL: ICD-10-CM

## 2021-12-13 LAB
ALBUMIN SERPL-MCNC: 3.8 G/DL (ref 3.5–5.2)
ALP BLD-CCNC: 84 U/L (ref 35–104)
ALT SERPL-CCNC: 17 U/L (ref 0–32)
ANION GAP SERPL CALCULATED.3IONS-SCNC: 15 MMOL/L (ref 7–16)
AST SERPL-CCNC: 19 U/L (ref 0–31)
BASOPHILS ABSOLUTE: 0.04 E9/L (ref 0–0.2)
BASOPHILS RELATIVE PERCENT: 0.4 % (ref 0–2)
BILIRUB SERPL-MCNC: <0.2 MG/DL (ref 0–1.2)
BUN BLDV-MCNC: 6 MG/DL (ref 6–20)
CALCIUM SERPL-MCNC: 9 MG/DL (ref 8.6–10.2)
CHLORIDE BLD-SCNC: 104 MMOL/L (ref 98–107)
CO2: 21 MMOL/L (ref 22–29)
CREAT SERPL-MCNC: 0.9 MG/DL (ref 0.5–1)
EOSINOPHILS ABSOLUTE: 0.21 E9/L (ref 0.05–0.5)
EOSINOPHILS RELATIVE PERCENT: 2 % (ref 0–6)
GFR AFRICAN AMERICAN: >60
GFR NON-AFRICAN AMERICAN: >60 ML/MIN/1.73
GLUCOSE BLD-MCNC: 97 MG/DL (ref 74–99)
HCT VFR BLD CALC: 40.1 % (ref 34–48)
HEMOGLOBIN: 13 G/DL (ref 11.5–15.5)
IMMATURE GRANULOCYTES #: 0.04 E9/L
IMMATURE GRANULOCYTES %: 0.4 % (ref 0–5)
LYMPHOCYTES ABSOLUTE: 3.43 E9/L (ref 1.5–4)
LYMPHOCYTES RELATIVE PERCENT: 33.3 % (ref 20–42)
MAGNESIUM: 2.2 MG/DL (ref 1.6–2.6)
MCH RBC QN AUTO: 33.4 PG (ref 26–35)
MCHC RBC AUTO-ENTMCNC: 32.4 % (ref 32–34.5)
MCV RBC AUTO: 103.1 FL (ref 80–99.9)
MONOCYTES ABSOLUTE: 0.62 E9/L (ref 0.1–0.95)
MONOCYTES RELATIVE PERCENT: 6 % (ref 2–12)
NEUTROPHILS ABSOLUTE: 5.96 E9/L (ref 1.8–7.3)
NEUTROPHILS RELATIVE PERCENT: 57.9 % (ref 43–80)
PDW BLD-RTO: 14 FL (ref 11.5–15)
PLATELET # BLD: 339 E9/L (ref 130–450)
PMV BLD AUTO: 10.8 FL (ref 7–12)
POTASSIUM SERPL-SCNC: 3.8 MMOL/L (ref 3.5–5)
RBC # BLD: 3.89 E12/L (ref 3.5–5.5)
SODIUM BLD-SCNC: 140 MMOL/L (ref 132–146)
T4 FREE: 0.93 NG/DL (ref 0.93–1.7)
TOTAL PROTEIN: 7.2 G/DL (ref 6.4–8.3)
TSH SERPL DL<=0.05 MIU/L-ACNC: 1.53 UIU/ML (ref 0.27–4.2)
WBC # BLD: 10.3 E9/L (ref 4.5–11.5)

## 2021-12-13 PROCEDURE — 1036F TOBACCO NON-USER: CPT | Performed by: INTERNAL MEDICINE

## 2021-12-13 PROCEDURE — G8427 DOCREV CUR MEDS BY ELIG CLIN: HCPCS | Performed by: INTERNAL MEDICINE

## 2021-12-13 PROCEDURE — G8417 CALC BMI ABV UP PARAM F/U: HCPCS | Performed by: INTERNAL MEDICINE

## 2021-12-13 PROCEDURE — 93000 ELECTROCARDIOGRAM COMPLETE: CPT | Performed by: INTERNAL MEDICINE

## 2021-12-13 PROCEDURE — 99214 OFFICE O/P EST MOD 30 MIN: CPT | Performed by: INTERNAL MEDICINE

## 2021-12-13 PROCEDURE — G8484 FLU IMMUNIZE NO ADMIN: HCPCS | Performed by: INTERNAL MEDICINE

## 2021-12-13 RX ORDER — AMOXICILLIN 500 MG/1
500 CAPSULE ORAL 3 TIMES DAILY
Qty: 21 CAPSULE | Refills: 0 | Status: SHIPPED | OUTPATIENT
Start: 2021-12-13 | End: 2021-12-20

## 2021-12-13 RX ORDER — GABAPENTIN 600 MG/1
TABLET ORAL
Qty: 75 TABLET | Refills: 2 | Status: SHIPPED
Start: 2021-12-13 | End: 2022-02-21 | Stop reason: SDUPTHER

## 2021-12-13 RX ORDER — VALACYCLOVIR HYDROCHLORIDE 1 G/1
1000 TABLET, FILM COATED ORAL DAILY
Qty: 30 TABLET | Refills: 2 | Status: SHIPPED
Start: 2021-12-13 | End: 2022-03-08 | Stop reason: SDUPTHER

## 2021-12-13 RX ORDER — MONTELUKAST SODIUM 10 MG/1
10 TABLET ORAL NIGHTLY
Qty: 30 TABLET | Refills: 2 | Status: SHIPPED
Start: 2021-12-13 | End: 2022-03-08 | Stop reason: SDUPTHER

## 2021-12-13 RX ORDER — DULOXETIN HYDROCHLORIDE 60 MG/1
60 CAPSULE, DELAYED RELEASE ORAL DAILY
Qty: 30 CAPSULE | Refills: 2 | Status: SHIPPED
Start: 2021-12-13 | End: 2022-03-08 | Stop reason: SDUPTHER

## 2021-12-13 RX ORDER — ACETAMINOPHEN AND CODEINE PHOSPHATE 300; 30 MG/1; MG/1
1 TABLET ORAL EVERY 4 HOURS PRN
Qty: 30 TABLET | Refills: 0 | Status: SHIPPED | OUTPATIENT
Start: 2021-12-13 | End: 2021-12-18

## 2021-12-13 RX ORDER — ATENOLOL 25 MG/1
50 TABLET ORAL 2 TIMES DAILY
Qty: 60 TABLET | Refills: 0
Start: 2021-12-13 | End: 2022-03-10

## 2021-12-13 RX ORDER — FLUTICASONE PROPIONATE 50 MCG
2 SPRAY, SUSPENSION (ML) NASAL DAILY
Qty: 1 EACH | Refills: 2 | Status: SHIPPED
Start: 2021-12-13 | End: 2022-03-08 | Stop reason: SDUPTHER

## 2021-12-13 RX ORDER — ALPRAZOLAM 1 MG/1
1 TABLET ORAL 2 TIMES DAILY
Qty: 60 TABLET | Refills: 2 | Status: SHIPPED | OUTPATIENT
Start: 2021-12-13 | End: 2022-03-08 | Stop reason: SDUPTHER

## 2021-12-13 RX ORDER — CYCLOBENZAPRINE HCL 5 MG
5 TABLET ORAL 3 TIMES DAILY PRN
Qty: 60 TABLET | Refills: 2 | Status: SHIPPED
Start: 2021-12-13 | End: 2022-02-10 | Stop reason: SDUPTHER

## 2021-12-13 ASSESSMENT — ENCOUNTER SYMPTOMS
SHORTNESS OF BREATH: 0
SORE THROAT: 0
NAUSEA: 0
VOMITING: 0
BLOOD IN STOOL: 0
ABDOMINAL PAIN: 0
RHINORRHEA: 0
DIARRHEA: 0
CONSTIPATION: 0
EYE PAIN: 0
COUGH: 0
CHEST TIGHTNESS: 0

## 2021-12-13 NOTE — TELEPHONE ENCOUNTER
Patient Appointment Form:      PCP: Patrick Jimenez  Referring: pcp    Has the Patient:    Seen a Cardiologist? no    Had a heart catheterization? no    Had heart surgery? no    Had a stress test or nuclear stress test? no    Had an echocardiogram? no    Had a vascular ultrasound? no    Had a 24/48 heart monitor or extended cardiac event monitor? no    Had recent blood work in the last 6 months? yes    date: 12/13/21    ordering physician: Patrick Jimenez    Had a pacemaker/ICD/ILR implant? no    Seen an Electrophysiologist? no        Will send records via: in [address] [place] [address]      Date & time of appointment:  appt 12/20 w/ Charles at 9:30 am  Pt da neg covid test 12/01/21

## 2021-12-13 NOTE — PROGRESS NOTES
[facility]   Internal Medicine     2021  Oneil Mendez : 1980 Sex: female  Age:41 y.o. Chief Complaint   Patient presents with    Post-COVID Symptoms     Tested positive  and had infusion. Still has spells of night sweats, headache, body aches, loss of tatse and smell all come and go     Herpes Zoster     Treated for shingles at [facility]. Still has a \"sore\" on her scalp. Was also sent to the eye doctor to confirm that shingles did not go into eye. Still gets redness in her right eye (using eye drops)    Gastroesophageal Reflux     Seen by Dr Neil King. Prednisone caused blood in stool.  Diarrhea     Since Covid infection     Dental Pain     Does not have a dentist     Tachycardia     and blood pressure has been running high         HPI:   Patient presents to office for evaluation of the following medical concerns. - States since last visit has had covid. States was given prednisone, did not take. States has monoclonal antibody infusion. States still with episodes of fatigue, weakness, aches and wheezing.     - States has been having increased heart rate and blood pressure. States has been having diaphoresis. optho () -spots on the right side of the head Romycin dexamethasone in the eyes to the right eye feel conjunctivitis not herpetic given Polytrim 4 times a day for 7 days. Still with spots to right head. - States having dental pain. - States anxiety is more uncontrolled. On duloxetine and on xanax. States increased taking xanax twice a day, asking for further increase. States also helps with sleeping. Did not go to counseling. No Suicidal thoughts. No reported side effects with medications. States meds are helping symptoms.    - States shingles, recurrent. Last exacerbation (2021) - increased valtrx to tid x 1 week, back to daily. Infectious disease recommended valtrex daily for suppressive treatment.  States taking gabapentin 600mg twice a day for post herpetic neuralgia. Not currently following with ID. Stable. - Has impaired fasting glucose. Following with endocrinology. States A1c was good at 5.9 (8/2021)     - States GERD, states on omeprazole - had EGD and colonoscopy - Sosa esophagus on (8/2016), follow up 1/2019 - no Sosa - states esophageal spasm and gastritis. Now on perphen-amitriptyline. On phenergan as needed. EGD (4/21) - gastric polyps, otherwise normal, path fundic gland polyp    - States having severe IBS with diarrhea, possible ulcerative colitis. Stopped Lialda. No abdo pain. No current bowel urgency, bleeding. States bloating and gas. No fever or chills. States follows with GI. States on Viberzi twice a day or once a day. Last colonoscopy (4/21) - normal colonoscopy, int hemorrhoids, path - colon mucosa w no signicant inflammation. continue Viberzi, Phenergan, perphemazine and amitriptyline. Last visit with GI per reviewed note (11/21) - no change f/u 1yr    - States allergy and sinus symptoms have improved and stable. On  Flonase. On Singulair States taking loratadine as needed. - States back pain is stable. States comes and goes. States gabapentin has helped. - States following with endocrinology for graves disease. States methimazole stopped. State on atenolol 50mg alternating with 25mg. Started on levothyroxine 50mcg daily. Last endo visit per reviewed note (8/21) -Graves' disease and thyroid nodules thyroid labs normal ultrasound stable, impaired fasting glucose A1c 5.7, follow-up with labs 2/2022.      - States insomnia has improved with xanax and duloxetine    - States had childhood asthma. States singular has helped. States loratadine and ProAir as needed. States no wheezing or SOB.     - States has seen gyn and placed on OCP for irregular menstrual cycles, last visit (11/2020)     Health Maintenance   - immunizations:   Influenza Vaccination  - declines  Pneumonia Vaccination  Zoster/Shingles Vaccine  Tetanus Vaccination - (2011)   covid - declined     - Screenings:   Bone Density Scan - (2/8/2017)  Pelvic/Pap Exam - (2017), (2019) - gyn  Mammogram (10/21) - No mammographic evidence of malignancy. Colonoscopy - (8/2016) - polyp, hyperplastic, internal hemorrhoids, (1/2019) - tortuous colon, colon spasm, diverticulosis, No UC seen, (4/21) - normal colonoscopy, int hemorrhoids, path - colon mucosa w no signicant inflammation     EGD - (8/2016) - Sosa's esophagus, gastric polyps, (1/2019), Mild to mod chronic active gastritis - esophageal spasm, (4/21) - gastric polyps, otherwise normal, path fundic gland polyp    ROS:  Review of Systems   Constitutional: Negative for appetite change, chills, fever and unexpected weight change. HENT: Negative for congestion, rhinorrhea and sore throat. Eyes: Negative for pain and visual disturbance. Respiratory: Negative for cough, chest tightness and shortness of breath. Cardiovascular: Negative for chest pain and palpitations. Gastrointestinal: Negative for abdominal pain, blood in stool, constipation, diarrhea, nausea and vomiting. Genitourinary: Negative for difficulty urinating, dysuria, frequency, pelvic pain, urgency and vaginal bleeding. Musculoskeletal: Negative for arthralgias and myalgias. Skin: Negative for rash. Neurological: Negative for dizziness, syncope, weakness, light-headedness, numbness and headaches. Hematological: Negative for adenopathy. Psychiatric/Behavioral: Negative for suicidal ideas. The patient is not nervous/anxious. Current Outpatient Medications:     gabapentin (NEURONTIN) 600 MG tablet, 1 tab 2-3 times qd, Disp: 75 tablet, Rfl: 2    ALPRAZolam (XANAX) 1 MG tablet, Take 1 tablet by mouth 2 times daily for 90 days. , Disp: 60 tablet, Rfl: 2    valACYclovir (VALTREX) 1 g tablet, Take 1 tablet by mouth daily, Disp: 30 tablet, Rfl: 2    montelukast (SINGULAIR) 10 MG tablet, Take 1 tablet by mouth nightly, Disp: 30 tablet, Rfl: 2    fluticasone (FLONASE) 50 MCG/ACT nasal spray, 2 sprays by Nasal route daily, Disp: 1 each, Rfl: 2    DULoxetine (CYMBALTA) 60 MG extended release capsule, Take 1 capsule by mouth daily, Disp: 30 capsule, Rfl: 2    cyclobenzaprine (FLEXERIL) 5 MG tablet, Take 1 tablet by mouth 3 times daily as needed for Muscle spasms, Disp: 60 tablet, Rfl: 2    acetaminophen-codeine (TYLENOL/CODEINE #3) 300-30 MG per tablet, Take 1 tablet by mouth every 4 hours as needed for Pain for up to 5 days. Intended supply: 5 days. Take lowest dose possible to manage pain, Disp: 30 tablet, Rfl: 0    amoxicillin (AMOXIL) 500 MG capsule, Take 1 capsule by mouth 3 times daily for 7 days, Disp: 21 capsule, Rfl: 0    promethazine (PHENERGAN) 25 MG tablet, Take 1 tablet by mouth every 8 hours as needed for Nausea 1-2 tabs q 4 hrs prn, Disp: 40 tablet, Rfl: 2    omeprazole (PRILOSEC) 40 MG delayed release capsule, Take 1 capsule by mouth daily, Disp: 30 capsule, Rfl: 5    loratadine (CLARITIN) 10 MG tablet, Take 1 tablet by mouth daily as needed (allergies), Disp: 30 tablet, Rfl: 2    levothyroxine (SYNTHROID) 50 MCG tablet, Take 50 mcg by mouth Daily, Disp: , Rfl:     albuterol sulfate  (90 Base) MCG/ACT inhaler, Inhale 2 puffs into the lungs every 4 hours as needed for Wheezing, Disp: 1 Inhaler, Rfl: 3    acetaminophen (TYLENOL) 500 MG tablet, Take 500 mg by mouth every 6 hours as needed for Pain, Disp: , Rfl:     amitriptyline (ELAVIL) 10 MG tablet, Take 10 mg by mouth nightly , Disp: , Rfl:     perphenazine 2 MG tablet, Take 2 mg by mouth daily , Disp: , Rfl:     atenolol (TENORMIN) 25 MG tablet, 50 mg daily Alternates between 25mg one day and the next 50mg (then repeat). States that she is taking 50mg qd for the last month, Disp: , Rfl: 0    CAMRESE LO 0.1-0.02 & 0.01 MG TABS, , Disp: , Rfl: 0    VIBERZI 100 MG TABS, 1 tablet 2 times daily. , Disp: , Rfl: 0    Allergies   Allergen Reactions    Latex     Ibuprofen Other (See Comments)     NSAIDs trigger colitis        Past Medical History:   Diagnosis Date    Asthma     Sosa's esophagus without dysplasia 2019    Chronic allergic rhinitis 2019    Colitis 2019    Depression     Esophageal spasm 2019    Gastroesophageal reflux disease without esophagitis 2019    Generalized anxiety disorder 2019    Hyperthyroidism 2019    Impaired fasting glucose 2019    Insomnia     Irritable bowel syndrome     Seasonal allergies     Ulcerative colitis (HCC)        Past Surgical History:   Procedure Laterality Date    NASAL SEPTUM SURGERY      biopsy     WISDOM TOOTH EXTRACTION         Family History   Problem Relation Age of Onset    Osteoarthritis Mother     Elevated Lipids Mother     Heart Disease Father     Other Father         back pain     Elevated Lipids Father     Colon Cancer Father     Colon Cancer Maternal Grandmother     Breast Cancer Maternal Aunt     Breast Cancer Paternal Aunt        Social History     Socioeconomic History    Marital status:      Spouse name: Not on file    Number of children: Not on file    Years of education: Not on file    Highest education level: Not on file   Occupational History    Not on file   Tobacco Use    Smoking status: Former Smoker     Packs/day: 0.50     Years: 1.50     Pack years: 0.75     Types: Cigarettes     Quit date: 1999     Years since quittin.4    Smokeless tobacco: Never Used   Substance and Sexual Activity    Alcohol use: Yes     Comment: socially     Drug use: Not on file    Sexual activity: Not on file   Other Topics Concern    Not on file   Social History Narrative    Not on file     Social Determinants of Health     Financial Resource Strain: Low Risk     Difficulty of Paying Living Expenses: Not hard at all   Food Insecurity: No Food Insecurity    Worried About Running Out of Food in the Last Year: no guarding or rebound. Musculoskeletal:         General: Normal range of motion. Cervical back: Normal range of motion and neck supple. Lymphadenopathy:      Cervical: No cervical adenopathy. Skin:     General: Skin is warm and dry. Neurological:      Mental Status: She is alert and oriented to person, place, and time. Cranial Nerves: No cranial nerve deficit. Psychiatric:         Judgment: Judgment normal.         Assessment and Plan:    Diagnoses and all orders for this visit:    COVID-19  - states treated with steroids  - states treatment with monoclonal antibody infusion   - still with some residual symptoms    Rash and nonspecific skin eruption  - location to scalp, uncertain as to cause   -     Pain, dental  - broken tooth   - needs to see dental - cannot get appointment     Tachycardia  - uncertain etiology   - on atenolol   - has hx of graves and would recheck labs   - EKG today   - referral to cardio logy   - increase atenolol to 50mg twice a day     Moderate episode of recurrent major depressive disorder (HCC)  - off paxil  - on duloxetine given helps also with joint pain at 60mg  - stable     Generalized anxiety disorder  - off paxil  - on duloxetine given helps also with joint pain at 60mg  - on xanax. Now increased to twice a day, asking for furthe rincrease   - suggest counseling - declines  - uncontrolled   - recommend referral - placed     [place] report reviewed (12/13/2021)  controlled substance agreement updated (2/25/20)    Patient requests benzodiazepine medication refill. I have reviewed the current medications and prior notes regarding the need for these refills. I believe the need for refill is warranted at this time. I have reviewed the [place] report and found no suspicion of drug seeking behavior. I have discussed the side effects and narcotic/benzodiazepine policy with this patient and the patient has demonstrated understanding.  A follow up appointment will be scheduled with me    Controlled Substance Monitoring:    Acute and Chronic Pain Monitoring:   RX Monitoring 12/13/2021   Periodic Controlled Substance Monitoring Possible medication side effects, risk of tolerance/dependence & alternative treatments discussed. ;No signs of potential drug abuse or diversion identified. Gayle's esophagus without dysplasia  - last EGD 4/2021- no gayle seen, gastric polyps  - on omeprazole  - following with GI  - stable     Gastroesophageal reflux disease without esophagitis  - watch diet  - on omeprazole   - discussed long term use side effects  - recommended haldol by GI - did not tolerate side effects   - on perphen-amitriptyline for spasm   - last EGD 4/2021- no gayle seen, gastric polyps  - stable     Colitis - poss Ulcerative colitis with complication, unspecified location [facility])  - following up with GI  - uncertain etiology   - stopped lialda  - last colonoscopy (4/21) - normal colonoscopy, int hemorrhoids, path - colon mucosa w no signicant inflammation    Impaired fasting glucose  hyperglycemia  - Follow A1c - last 8/2021 - 5.9     Mixed hyperlipidemia  - watch diet  - calculate 10 year risk   - not on medications at present   - may need to consider medications     Hyperthyroidism  - following with endocrinology  - off methimazole  - on atenolol on alternating doses  - last lab (8/2021)  - on levothyroxine 50mcg  - to have follow up labs     Esophageal spasm  - EGD (2019) - esophageal spasm and gastritis, no gayle  - recommended haldol by GI - did not tolerate   - concern with antipsychotic, long term use adverse reactions and other drug interactions and uncertain indication.   - on perphen-amitriptyline  - unclear if calcium channel blocker is contraindicated  - Marinol not covered    Post herpetic neuralgia  - on gabapentin and valtrex  - Stable    Hx of herpes zoster  - recurrent   - last exacerbation (6/2021)   - on valtrex daily suppressive     Chronic allergic rhinitis and Mild intermittent asthma without complication  - on singulair  - on flonase   - on antihistamine as needed  - stable     Vitamin D deficiency  - on vitamin D3 2000units daily   - follow labs     Primary osteoarthritis involving multiple joints  - uncertain etiology  - improved on duloxetine  - Stable     Long term use of drug    Return in about 3 months (around 3/13/2022) for check up and review. Orders Placed This Encounter   Procedures    Comprehensive Metabolic Panel     Standing Status:   Future     Standing Expiration Date:   2022    TSH without Reflex     Standing Status:   Future     Standing Expiration Date:   2022    Magnesium     Standing Status:   Future     Standing Expiration Date:   2022    CBC Auto Differential     Standing Status:   Future     Standing Expiration Date:   2022    T4, FREE     Standing Status:   Future     Standing Expiration Date:   2022   [facility]     Referral Priority:   Routine     Referral Type:   Eval and Treat     Referral Reason:   Specialty Services Required     Requested Specialty:   Cardiology     Number of Visits Requested:   1   Rosa Lima DO, [facility] ([facility])     Referral Priority:   Routine     Referral Type:   Eval and Treat     Referral Reason:   Specialty Services Required     Referred to Provider:   Carlos Dorsey DO     Requested Specialty:   Dermatology     Number of Visits Requested:   1    EKG 12 Lead     Order Specific Question:   Reason for Exam?     Answer:   Chest pain     Requested Prescriptions     Signed Prescriptions Disp Refills    gabapentin (NEURONTIN) 600 MG tablet 75 tablet 2     Si tab 2-3 times qd    ALPRAZolam (XANAX) 1 MG tablet 60 tablet 2     Sig: Take 1 tablet by mouth 2 times daily for 90 days.     valACYclovir (VALTREX) 1 g tablet 30 tablet 2     Sig: Take 1 tablet by mouth daily    montelukast (SINGULAIR) 10 MG tablet 30 tablet 2     Sig: Take 1 tablet by mouth nightly    fluticasone (FLONASE) 50 MCG/ACT nasal spray 1 each 2     Si sprays by Nasal route daily    DULoxetine (CYMBALTA) 60 MG extended release capsule 30 capsule 2     Sig: Take 1 capsule by mouth daily    cyclobenzaprine (FLEXERIL) 5 MG tablet 60 tablet 2     Sig: Take 1 tablet by mouth 3 times daily as needed for Muscle spasms    acetaminophen-codeine (TYLENOL/CODEINE #3) 300-30 MG per tablet 30 tablet 0     Sig: Take 1 tablet by mouth every 4 hours as needed for Pain for up to 5 days. Intended supply: 5 days.  Take lowest dose possible to manage pain    amoxicillin (AMOXIL) 500 MG capsule 21 capsule 0     Sig: Take 1 capsule by mouth 3 times daily for 7 days       Ester Chung MD  2021  9:04 AM

## 2021-12-14 ENCOUNTER — TELEPHONE (OUTPATIENT)
Dept: FAMILY MEDICINE CLINIC | Age: 41
End: 2021-12-14

## 2021-12-14 NOTE — TELEPHONE ENCOUNTER
Please let the patient know that blood work results showed    Bicarbonate level was borderline low    Other electrolytes, liver functions, and kidney function values were normal    Blood counts were normal with just an elevated MCV which is describing slightly larger red blood cells    Thyroid labs including free T4 were normal    Thanks

## 2021-12-14 NOTE — LETTER
[address] [place] [address]  Phone: [phone]  Fax: [phone]    Marquis Nagy MD        December 14, 2021     [facility] [id number]      Dear Zachary Brown:    Below are the results from your recent visit:    Resulted Orders   T4, FREE   Result Value Ref Range    T4 Free 0.93 0.93 - 1.70 ng/dL   CBC Auto Differential   Result Value Ref Range    WBC 10.3 4.5 - 11.5 E9/L    RBC 3.89 3.50 - 5.50 E12/L    Hemoglobin 13.0 11.5 - 15.5 g/dL    Hematocrit 40.1 34.0 - 48.0 %    .1 (H) 80.0 - 99.9 fL    MCH 33.4 26.0 - 35.0 pg    MCHC 32.4 32.0 - 34.5 %    RDW 14.0 11.5 - 15.0 fL    Platelets 489 767 - 194 E9/L    MPV 10.8 7.0 - 12.0 fL    Neutrophils % 57.9 43.0 - 80.0 %    Immature Granulocytes % 0.4 0.0 - 5.0 %    Lymphocytes % 33.3 20.0 - 42.0 %    Monocytes % 6.0 2.0 - 12.0 %    Eosinophils % 2.0 0.0 - 6.0 %    Basophils % 0.4 0.0 - 2.0 %    Neutrophils Absolute 5.96 1.80 - 7.30 E9/L    Immature Granulocytes # 0.04 E9/L    Lymphocytes Absolute 3.43 1.50 - 4.00 E9/L    Monocytes Absolute 0.62 0.10 - 0.95 E9/L    Eosinophils Absolute 0.21 0.05 - 0.50 E9/L    Basophils Absolute 0.04 0.00 - 0.20 E9/L   Magnesium   Result Value Ref Range    Magnesium 2.2 1.6 - 2.6 mg/dL   TSH without Reflex   Result Value Ref Range    TSH 1.530 0.270 - 4.200 uIU/mL   Comprehensive Metabolic Panel   Result Value Ref Range    Sodium 140 132 - 146 mmol/L    Potassium 3.8 3.5 - 5.0 mmol/L    Chloride 104 98 - 107 mmol/L    CO2 21 (L) 22 - 29 mmol/L    Anion Gap 15 7 - 16 mmol/L    Glucose 97 74 - 99 mg/dL    BUN 6 6 - 20 mg/dL    CREATININE 0.9 0.5 - 1.0 mg/dL    GFR Non-African American >60 >=60 mL/min/1.73      Comment:      Chronic Kidney Disease: less than 60 ml/min/1.73 sq.m. Kidney Failure: less than 15 ml/min/1.73 sq.m. Results valid for patients 18 years and older.       GFR African American >60     Calcium 9.0 8.6 - 10.2 mg/dL Total Protein 7.2 6.4 - 8.3 g/dL    Albumin 3.8 3.5 - 5.2 g/dL    Total Bilirubin <0.2 0.0 - 1.2 mg/dL    Alkaline Phosphatase 84 35 - 104 U/L    ALT 17 0 - 32 U/L    AST 19 0 - 31 U/L     The test results show:    Bicarbonate level was borderline low     Other electrolytes, liver functions, and kidney function values were normal     Blood counts were normal with just an elevated MCV which is describing slightly larger red blood cells     Thyroid labs including free T4 were normal      If you have any questions or concerns, please don't hesitate to call.     Sincerely,        Bucky Bess MD

## 2021-12-20 PROBLEM — R00.0 SINUS TACHYCARDIA: Status: ACTIVE | Noted: 2021-12-20

## 2022-01-11 DIAGNOSIS — K08.89 TOOTH ACHE: Primary | ICD-10-CM

## 2022-01-11 RX ORDER — ACETAMINOPHEN AND CODEINE PHOSPHATE 300; 30 MG/1; MG/1
1 TABLET ORAL EVERY 4 HOURS PRN
Qty: 30 TABLET | Refills: 0 | OUTPATIENT
Start: 2022-01-11 | End: 2022-02-10

## 2022-01-11 NOTE — TELEPHONE ENCOUNTER
Name of Medication(s) Requested:  Tylenol #3    Pharmacy Requested:   [facility]     Medication(s) pended? [x] Yes  [] No    Last Appointment:  12/13/2021    Future appts:  Future Appointments   Date Time Provider Jeanette Nielsen   3/8/2022  3:00 PM Alma Cervantes MD [facility]          Does patient need call back? [x] Yes  [] No  Patient asking for a refill on tylenol #3 for dental pain. Dentist told her she needs a root canal. She is scheduled for one with a specialist on 01/19. Dentist differed refill to pcp. She states she takes at nigh to sleep from her pain from her broken teeth.

## 2022-01-11 NOTE — TELEPHONE ENCOUNTER
Cannot refill pain medications without appointments    Will need an update regarding reason for medications, has she any upcoming appointments to take care of the issue

## 2022-01-12 NOTE — TELEPHONE ENCOUNTER
Spoke w/ Nai Estrada and let her know that Dr Maddy Singh does not like to prescribe controlled pain medications long term. We had given the prescription for dental pain but had not intended to refill it. Nai Estrada said that she understands. She was actually able to see the dentis today (her appointment was moved up) and he told her that the antibiotic she stated yesterday should help w/ the pain.  He was able to fix one tooth today and she will be going back for a root canal.

## 2022-02-01 DIAGNOSIS — K21.9 GASTROESOPHAGEAL REFLUX DISEASE WITHOUT ESOPHAGITIS: ICD-10-CM

## 2022-02-01 DIAGNOSIS — K22.70 BARRETT'S ESOPHAGUS WITHOUT DYSPLASIA: ICD-10-CM

## 2022-02-01 DIAGNOSIS — J30.9 CHRONIC ALLERGIC RHINITIS: ICD-10-CM

## 2022-02-01 RX ORDER — PROMETHAZINE HYDROCHLORIDE 25 MG/1
25 TABLET ORAL EVERY 8 HOURS PRN
Qty: 40 TABLET | Refills: 2 | Status: SHIPPED
Start: 2022-02-01 | End: 2022-04-13 | Stop reason: SDUPTHER

## 2022-02-01 RX ORDER — LORATADINE 10 MG/1
10 TABLET ORAL DAILY PRN
Qty: 30 TABLET | Refills: 5 | Status: SHIPPED | OUTPATIENT
Start: 2022-02-01

## 2022-02-01 NOTE — TELEPHONE ENCOUNTER
Last Appointment:  12/13/2021  Future Appointments   Date Time Provider Jeanette Nielsen   3/8/2022  3:00 PM Grygla MD [place] [address]

## 2022-02-11 RX ORDER — CYCLOBENZAPRINE HCL 5 MG
5 TABLET ORAL 3 TIMES DAILY PRN
Qty: 60 TABLET | Refills: 2 | Status: SHIPPED
Start: 2022-02-11 | End: 2022-04-13 | Stop reason: SDUPTHER

## 2022-02-11 NOTE — TELEPHONE ENCOUNTER
Last Appointment:  12/13/2021  Future Appointments   Date Time Provider Jeanette Nielsen   3/8/2022  3:00 PM Basil Salomon  [address] [place] [address]

## 2022-02-21 DIAGNOSIS — B02.29 POST HERPETIC NEURALGIA: ICD-10-CM

## 2022-02-21 LAB
ALBUMIN SERPL-MCNC: 3.8 G/DL (ref 3.5–5.2)
ALP BLD-CCNC: 66 U/L (ref 35–104)
ALT SERPL-CCNC: 18 U/L (ref 0–32)
ANION GAP SERPL CALCULATED.3IONS-SCNC: 13 MMOL/L (ref 7–16)
AST SERPL-CCNC: 25 U/L (ref 0–31)
BILIRUB SERPL-MCNC: <0.2 MG/DL (ref 0–1.2)
BUN BLDV-MCNC: 6 MG/DL (ref 6–20)
CALCIUM SERPL-MCNC: 8.6 MG/DL (ref 8.6–10.2)
CHLORIDE BLD-SCNC: 104 MMOL/L (ref 98–107)
CO2: 20 MMOL/L (ref 22–29)
CREAT SERPL-MCNC: 0.8 MG/DL (ref 0.5–1)
GFR AFRICAN AMERICAN: >60
GFR NON-AFRICAN AMERICAN: >60 ML/MIN/1.73
GLUCOSE BLD-MCNC: 125 MG/DL (ref 74–99)
HBA1C MFR BLD: 5.4 % (ref 4–5.6)
POTASSIUM SERPL-SCNC: 4.6 MMOL/L (ref 3.5–5)
SODIUM BLD-SCNC: 137 MMOL/L (ref 132–146)
T4 FREE: 0.91 NG/DL (ref 0.93–1.7)
TOTAL PROTEIN: 7.1 G/DL (ref 6.4–8.3)
TSH SERPL DL<=0.05 MIU/L-ACNC: 0.95 UIU/ML (ref 0.27–4.2)

## 2022-02-21 RX ORDER — GABAPENTIN 600 MG/1
TABLET ORAL
Qty: 75 TABLET | Refills: 0 | Status: SHIPPED
Start: 2022-02-21 | End: 2022-03-08 | Stop reason: SDUPTHER

## 2022-02-21 NOTE — TELEPHONE ENCOUNTER
Pharmacy is requesting refill    Last Appointment:  12/13/2021  Future Appointments   Date Time Provider Jeanette Nielsen   3/8/2022  3:00 PM Helga Rodriguez  [address]

## 2022-03-08 ENCOUNTER — OFFICE VISIT (OUTPATIENT)
Dept: FAMILY MEDICINE CLINIC | Age: 42
End: 2022-03-08
Payer: COMMERCIAL

## 2022-03-08 VITALS
HEIGHT: 63 IN | WEIGHT: 227.25 LBS | DIASTOLIC BLOOD PRESSURE: 86 MMHG | BODY MASS INDEX: 40.27 KG/M2 | OXYGEN SATURATION: 98 % | HEART RATE: 104 BPM | SYSTOLIC BLOOD PRESSURE: 124 MMHG | TEMPERATURE: 96.9 F

## 2022-03-08 DIAGNOSIS — K22.70 BARRETT'S ESOPHAGUS WITHOUT DYSPLASIA: ICD-10-CM

## 2022-03-08 DIAGNOSIS — B02.29 POST HERPETIC NEURALGIA: ICD-10-CM

## 2022-03-08 DIAGNOSIS — E55.9 VITAMIN D DEFICIENCY: ICD-10-CM

## 2022-03-08 DIAGNOSIS — E78.2 MIXED HYPERLIPIDEMIA: ICD-10-CM

## 2022-03-08 DIAGNOSIS — M15.9 PRIMARY OSTEOARTHRITIS INVOLVING MULTIPLE JOINTS: ICD-10-CM

## 2022-03-08 DIAGNOSIS — R73.01 IMPAIRED FASTING GLUCOSE: ICD-10-CM

## 2022-03-08 DIAGNOSIS — K52.9 COLITIS: ICD-10-CM

## 2022-03-08 DIAGNOSIS — K21.9 GASTROESOPHAGEAL REFLUX DISEASE WITHOUT ESOPHAGITIS: ICD-10-CM

## 2022-03-08 DIAGNOSIS — R00.0 TACHYCARDIA: ICD-10-CM

## 2022-03-08 DIAGNOSIS — E05.90 HYPERTHYROIDISM: ICD-10-CM

## 2022-03-08 DIAGNOSIS — J45.20 MILD INTERMITTENT ASTHMA WITHOUT COMPLICATION: ICD-10-CM

## 2022-03-08 DIAGNOSIS — R21 RASH AND NONSPECIFIC SKIN ERUPTION: Primary | ICD-10-CM

## 2022-03-08 DIAGNOSIS — F33.1 MODERATE EPISODE OF RECURRENT MAJOR DEPRESSIVE DISORDER (HCC): ICD-10-CM

## 2022-03-08 DIAGNOSIS — F41.1 GENERALIZED ANXIETY DISORDER: ICD-10-CM

## 2022-03-08 DIAGNOSIS — J30.9 CHRONIC ALLERGIC RHINITIS: ICD-10-CM

## 2022-03-08 DIAGNOSIS — K22.4 ESOPHAGEAL SPASM: ICD-10-CM

## 2022-03-08 DIAGNOSIS — N92.6 IRREGULAR MENSTRUAL CYCLE: ICD-10-CM

## 2022-03-08 PROBLEM — K51.919 ULCERATIVE COLITIS WITH COMPLICATION, UNSPECIFIED LOCATION (HCC): Status: RESOLVED | Noted: 2019-07-23 | Resolved: 2022-03-08

## 2022-03-08 PROBLEM — K51.919 ULCERATIVE COLITIS WITH COMPLICATION, UNSPECIFIED LOCATION (HCC): Status: ACTIVE | Noted: 2019-07-23

## 2022-03-08 PROCEDURE — 1036F TOBACCO NON-USER: CPT | Performed by: INTERNAL MEDICINE

## 2022-03-08 PROCEDURE — G8484 FLU IMMUNIZE NO ADMIN: HCPCS | Performed by: INTERNAL MEDICINE

## 2022-03-08 PROCEDURE — G8427 DOCREV CUR MEDS BY ELIG CLIN: HCPCS | Performed by: INTERNAL MEDICINE

## 2022-03-08 PROCEDURE — G8417 CALC BMI ABV UP PARAM F/U: HCPCS | Performed by: INTERNAL MEDICINE

## 2022-03-08 PROCEDURE — 99214 OFFICE O/P EST MOD 30 MIN: CPT | Performed by: INTERNAL MEDICINE

## 2022-03-08 RX ORDER — ATENOLOL 25 MG/1
50 TABLET ORAL 2 TIMES DAILY
Qty: 120 TABLET | Refills: 2 | Status: CANCELLED | OUTPATIENT
Start: 2022-03-08

## 2022-03-08 RX ORDER — VALACYCLOVIR HYDROCHLORIDE 1 G/1
1000 TABLET, FILM COATED ORAL DAILY
Qty: 30 TABLET | Refills: 2 | Status: SHIPPED
Start: 2022-03-08 | End: 2022-05-31 | Stop reason: SDUPTHER

## 2022-03-08 RX ORDER — ALPRAZOLAM 1 MG/1
1 TABLET ORAL 2 TIMES DAILY
Qty: 60 TABLET | Refills: 2 | Status: SHIPPED | OUTPATIENT
Start: 2022-03-08 | End: 2022-06-01 | Stop reason: SDUPTHER

## 2022-03-08 RX ORDER — DULOXETIN HYDROCHLORIDE 60 MG/1
60 CAPSULE, DELAYED RELEASE ORAL DAILY
Qty: 30 CAPSULE | Refills: 2 | Status: SHIPPED
Start: 2022-03-08 | End: 2022-05-31 | Stop reason: SDUPTHER

## 2022-03-08 RX ORDER — GABAPENTIN 600 MG/1
TABLET ORAL
Qty: 75 TABLET | Refills: 2 | Status: SHIPPED
Start: 2022-03-08 | End: 2022-06-06 | Stop reason: SDUPTHER

## 2022-03-08 RX ORDER — FLUTICASONE PROPIONATE 50 MCG
2 SPRAY, SUSPENSION (ML) NASAL DAILY
Qty: 1 EACH | Refills: 2 | Status: SHIPPED
Start: 2022-03-08 | End: 2022-06-06 | Stop reason: SDUPTHER

## 2022-03-08 RX ORDER — OMEPRAZOLE 40 MG/1
40 CAPSULE, DELAYED RELEASE ORAL DAILY
Qty: 30 CAPSULE | Refills: 5 | Status: SHIPPED
Start: 2022-03-08 | End: 2022-09-13 | Stop reason: SDUPTHER

## 2022-03-08 RX ORDER — MONTELUKAST SODIUM 10 MG/1
10 TABLET ORAL NIGHTLY
Qty: 30 TABLET | Refills: 2 | Status: SHIPPED
Start: 2022-03-08 | End: 2022-05-31 | Stop reason: SDUPTHER

## 2022-03-08 RX ORDER — ATENOLOL 50 MG/1
50 TABLET ORAL DAILY
Qty: 30 TABLET | Refills: 2 | Status: SHIPPED
Start: 2022-03-08 | End: 2022-03-10 | Stop reason: SDUPTHER

## 2022-03-08 ASSESSMENT — ENCOUNTER SYMPTOMS
COUGH: 0
ABDOMINAL PAIN: 0
RHINORRHEA: 0
VOMITING: 0
CHEST TIGHTNESS: 0
DIARRHEA: 0
CONSTIPATION: 0
SHORTNESS OF BREATH: 0
SORE THROAT: 0
BLOOD IN STOOL: 0
EYE PAIN: 0
NAUSEA: 0

## 2022-03-08 NOTE — PROGRESS NOTES
[facility]) Physicians   Internal Medicine     3/8/2022  Jeramy Hung : 1980 Sex: female  Age:41 y.o. Chief Complaint   Patient presents with    3 Month Follow-Up    Other     Spot on forehead that is painful and tender. Scheculed 3/14/22 to see derm in [place]    Wheezing     Wheezing and SOB     Hypertension     Controlled on higher dose of atenolol     Hypothyroidism     Endo ordered ultrasound of thyroid         HPI:   Patient presents to office for evaluation of the following medical concerns. - States has been having increased heart rate and blood pressure. States has been having diaphoresis. Now on atenolol 50mg twice a day. Was referred to cardiology, did not schedule. - States anxiety is stable. On duloxetine and on xanax. States increased taking xanax twice a day, asking for further increase. States also helps with sleeping. Did not go to counseling. No Suicidal thoughts. No reported side effects with medications. States meds are helping symptoms. Last PHQ score of 0 (3/2021)     - States shingles, recurrent. Last exacerbation (2021) - increased valtrx to tid x 1 week, back to daily. Infectious disease recommended valtrex daily for suppressive treatment. States taking gabapentin 600mg twice a day for post herpetic neuralgia. Not currently following with ID. Stable. - Has impaired fasting glucose. Following with endocrinology. States A1c was good at 5.4 (2022)     - States GERD, states on omeprazole - had EGD and colonoscopy - Sosa esophagus on (2016), follow up 2019 - no Sosa - states esophageal spasm and gastritis. Now on perphen-amitriptyline. On phenergan as needed. EGD () - gastric polyps, otherwise normal, path fundic gland polyp    - States having severe IBS with diarrhea, possible ulcerative colitis. Stopped Lialda. No abdo pain. No current bowel urgency, bleeding. States bloating and gas. No fever or chills. States follows with GI.  States on . AshtynLivestreamVamsi Voucherlink twice a day or once a day. Last colonoscopy (4/21) - normal colonoscopy, int hemorrhoids, path - colon mucosa w no signicant inflammation. continue Viberzi, Phenergan, perphemazine and amitriptyline. Last visit with GI per reviewed note (11/21) - no change f/u 1yr    - States allergy and sinus symptoms have improved and stable. On  Flonase. On Singulair States taking loratadine as needed. - States back pain is stable. States comes and goes. States gabapentin has helped. - States following with endocrinology for graves disease. States methimazole stopped. State on atenolol 50mg alternating with 25mg. Started on levothyroxine 50mcg daily. Last endo visit per reviewed note (3/22) -Graves' disease now hypothyroid increase levothyroxine to 75 mcg recheck labs 6 weeks impaired fasting glucose A1c 5.4 thyroid nodules ultrasound shows stability      - States insomnia has improved with xanax and duloxetine    - States had childhood asthma. States singular has helped. States loratadine and ProAir as needed. States no wheezing or SOB. - States has seen gyn and placed on OCP for irregular menstrual cycles, last visit (11/2020)     optho (11/21) -spots on the right side of the head Romycin dexamethasone in the eyes to the right eye feel conjunctivitis not herpetic given Polytrim 4 times a day for 7 days. Still with spots to right head. Health Maintenance   - immunizations:   Influenza Vaccination  - declines  Pneumonia Vaccination  Zoster/Shingles Vaccine  Tetanus Vaccination - (2011)   covid - declined     - Screenings:   Bone Density Scan - (2/8/2017)  Pelvic/Pap Exam - (2017), (2019) - gyn  Mammogram (10/21) - No mammographic evidence of malignancy.     Colonoscopy - (8/2016) - polyp, hyperplastic, internal hemorrhoids, (1/2019) - tortuous colon, colon spasm, diverticulosis, No UC seen, (4/21) - normal colonoscopy, int hemorrhoids, path - colon mucosa w no signicant inflammation     EGD - (8/2016) - Sosa's esophagus, gastric polyps, (1/2019), Mild to mod chronic active gastritis - esophageal spasm, (4/21) - gastric polyps, otherwise normal, path fundic gland polyp    ROS:  Review of Systems   Constitutional: Negative for appetite change, chills, fever and unexpected weight change. HENT: Negative for congestion, rhinorrhea and sore throat. Eyes: Negative for pain and visual disturbance. Respiratory: Negative for cough, chest tightness and shortness of breath. Cardiovascular: Negative for chest pain and palpitations. Gastrointestinal: Negative for abdominal pain, blood in stool, constipation, diarrhea, nausea and vomiting. Genitourinary: Negative for difficulty urinating, dysuria, frequency, pelvic pain, urgency and vaginal bleeding. Musculoskeletal: Negative for arthralgias and myalgias. Skin: Negative for rash. Neurological: Negative for dizziness, syncope, weakness, light-headedness, numbness and headaches. Hematological: Negative for adenopathy. Psychiatric/Behavioral: Negative for suicidal ideas. The patient is not nervous/anxious. Current Outpatient Medications:     ALPRAZolam (XANAX) 1 MG tablet, Take 1 tablet by mouth 2 times daily for 90 days. , Disp: 60 tablet, Rfl: 2    valACYclovir (VALTREX) 1 g tablet, Take 1 tablet by mouth daily, Disp: 30 tablet, Rfl: 2    montelukast (SINGULAIR) 10 MG tablet, Take 1 tablet by mouth nightly, Disp: 30 tablet, Rfl: 2    omeprazole (PRILOSEC) 40 MG delayed release capsule, Take 1 capsule by mouth daily, Disp: 30 capsule, Rfl: 5    DULoxetine (CYMBALTA) 60 MG extended release capsule, Take 1 capsule by mouth daily, Disp: 30 capsule, Rfl: 2    fluticasone (FLONASE) 50 MCG/ACT nasal spray, 2 sprays by Nasal route daily, Disp: 1 each, Rfl: 2    gabapentin (NEURONTIN) 600 MG tablet, 1 tab 2-3 times qd, Disp: 75 tablet, Rfl: 2    atenolol (TENORMIN) 50 MG tablet, Take 1 tablet by mouth daily, Disp: 30 tablet, Rfl: 2    cyclobenzaprine (FLEXERIL) 5 MG tablet, Take 1 tablet by mouth 3 times daily as needed for Muscle spasms, Disp: 60 tablet, Rfl: 2    loratadine (CLARITIN) 10 MG tablet, Take 1 tablet by mouth daily as needed (allergies), Disp: 30 tablet, Rfl: 5    promethazine (PHENERGAN) 25 MG tablet, Take 1 tablet by mouth every 8 hours as needed for Nausea 1-2 tabs q 4 hrs prn, Disp: 40 tablet, Rfl: 2    atenolol (TENORMIN) 25 MG tablet, Take 2 tablets by mouth 2 times daily, Disp: 60 tablet, Rfl: 0    Levothyroxine Sodium 75 MCG CAPS, Take 75 mcg by mouth Daily , Disp: , Rfl:     albuterol sulfate  (90 Base) MCG/ACT inhaler, Inhale 2 puffs into the lungs every 4 hours as needed for Wheezing, Disp: 1 Inhaler, Rfl: 3    acetaminophen (TYLENOL) 500 MG tablet, Take 500 mg by mouth every 6 hours as needed for Pain, Disp: , Rfl:     amitriptyline (ELAVIL) 10 MG tablet, Take 10 mg by mouth nightly , Disp: , Rfl:     perphenazine 2 MG tablet, Take 2 mg by mouth daily , Disp: , Rfl:     CAMRESE LO 0.1-0.02 & 0.01 MG TABS, , Disp: , Rfl: 0    VIBERZI 100 MG TABS, 1 tablet 2 times daily. , Disp: , Rfl: 0    Allergies   Allergen Reactions    Latex     Ibuprofen Other (See Comments)     NSAIDs trigger colitis        Past Medical History:   Diagnosis Date    Asthma     Sosa's esophagus without dysplasia 7/23/2019    Chronic allergic rhinitis 7/23/2019    Colitis 7/23/2019    Depression     Esophageal spasm 7/23/2019    Gastroesophageal reflux disease without esophagitis 7/23/2019    Generalized anxiety disorder 7/23/2019    Hyperthyroidism 7/23/2019    Impaired fasting glucose 7/23/2019    Insomnia     Irritable bowel syndrome     Seasonal allergies     Ulcerative colitis (HCC)        Past Surgical History:   Procedure Laterality Date    NASAL SEPTUM SURGERY      biopsy     WISDOM TOOTH EXTRACTION         Family History   Problem Relation Age of Onset    Osteoarthritis Mother     Elevated Lipids Mother     Heart Disease Father     Other Father         back pain     Elevated Lipids Father     Colon Cancer Father     Colon Cancer Maternal Grandmother     Breast Cancer Maternal Aunt     Breast Cancer Paternal Aunt        Social History     Socioeconomic History    Marital status:      Spouse name: Not on file    Number of children: Not on file    Years of education: Not on file    Highest education level: Not on file   Occupational History    Not on file   Tobacco Use    Smoking status: Former Smoker     Packs/day: 0.50     Years: 1.50     Pack years: 0.75     Types: Cigarettes     Quit date: 1999     Years since quittin.6    Smokeless tobacco: Never Used   Substance and Sexual Activity    Alcohol use: Yes     Comment: socially     Drug use: Not on file    Sexual activity: Not on file   Other Topics Concern    Not on file   Social History Narrative    Not on file     Social Determinants of Health     Financial Resource Strain: Low Risk     Difficulty of Paying Living Expenses: Not hard at all   Food Insecurity: No Food Insecurity    Worried About [address] in the Last Year: Never true    [address] in the Last Year: Never true   Transportation Needs:     Lack of Transportation (Medical): Not on file    Lack of Transportation (Non-Medical):  Not on file   Physical Activity:     Days of Exercise per Week: Not on file    Minutes of Exercise per Session: Not on file   Stress:     Feeling of Stress : Not on file   Social Connections:     Frequency of Communication with Friends and Family: Not on file    Frequency of Social Gatherings with Friends and Family: Not on file    Attends Denominational Services: Not on file    Active Member of Clubs or Organizations: Not on file    Attends Club or Organization Meetings: Not on file    Marital Status: Not on file   Intimate Partner Violence:     Fear of Current or Ex-Partner: Not on file    Emotionally Abused: Not on file   [facility] Physically Abused: Not on file    Sexually Abused: Not on file   Housing Stability:     Unable to Pay for Housing in the Last Year: Not on file    Number of Places Lived in the Last Year: Not on file    Unstable Housing in the Last Year: Not on file       Vitals:    03/08/22 1507   BP: 124/86   Pulse: 104   Temp: 96.9 °F (36.1 °C)   SpO2: 98%   Weight: 227 lb 4 oz (103.1 kg)   Height: 5' 3\" (1.6 m)       Exam:  Physical Exam  Constitutional:       Appearance: She is well-developed. HENT:      Head: Normocephalic and atraumatic. Right Ear: External ear normal.      Left Ear: External ear normal.   Eyes:      Pupils: Pupils are equal, round, and reactive to light. Neck:      Thyroid: No thyromegaly. Cardiovascular:      Rate and Rhythm: Normal rate and regular rhythm. Heart sounds: Normal heart sounds. No murmur heard. Pulmonary:      Effort: Pulmonary effort is normal.      Breath sounds: Normal breath sounds. No wheezing. Abdominal:      General: Bowel sounds are normal. There is no distension. Palpations: Abdomen is soft. Tenderness: There is no abdominal tenderness. There is no guarding or rebound. Musculoskeletal:         General: Normal range of motion. Cervical back: Normal range of motion and neck supple. Lymphadenopathy:      Cervical: No cervical adenopathy. Skin:     General: Skin is warm and dry. Neurological:      Mental Status: She is alert and oriented to person, place, and time. Cranial Nerves: No cranial nerve deficit.    Psychiatric:         Judgment: Judgment normal.         Assessment and Plan:    Diagnoses and all orders for this visit:    Rash and nonspecific skin eruption  - location to scalp, uncertain as to cause   - to see dermatology     Tachycardia  - uncertain etiology   - has hx of graves and would recheck labs   - referral to cardiology  - declined   - increased atenolol to 50mg twice a day   - improved after medication change Moderate episode of recurrent major depressive disorder (HCC)  - off paxil  - on duloxetine given helps also with joint pain at 60mg  - stable     Generalized anxiety disorder  - off paxil  - on duloxetine given helps also with joint pain at 60mg  - on xanax. Now increased to twice a day, asking for furthe rincrease   - suggest counseling - declines  - uncontrolled   - recommend referral - placed     [place]AR[place] report reviewed (3/8/2022)  controlled substance agreement updated (2/25/20)    Patient requests benzodiazepine medication refill. I have reviewed the current medications and prior notes regarding the need for these refills. I believe the need for refill is warranted at this time. I have reviewed the OARRS report and found no suspicion of drug seeking behavior. I have discussed the side effects and narcotic/benzodiazepine policy with this patient and the patient has demonstrated understanding. A follow up appointment will be scheduled with me    Controlled Substance Monitoring:    Acute and Chronic Pain Monitoring:   RX Monitoring 3/8/2022   Periodic Controlled Substance Monitoring Possible medication side effects, risk of tolerance/dependence & alternative treatments discussed. ;No signs of potential drug abuse or diversion identified.        Gayle's esophagus without dysplasia  - last EGD 4/2021- no gayle seen, gastric polyps  - on omeprazole  - following with GI  - stable     Gastroesophageal reflux disease without esophagitis  - watch diet  - on omeprazole   - discussed long term use side effects  - recommended haldol by GI - did not tolerate side effects   - on perphen-amitriptyline for spasm   - last EGD 4/2021- no gayle seen, gastric polyps  - stable     Colitis - poss [facility]cer[facility] colitis with complication, [facility]  - following up with GI  - uncertain etiology   - stopped lialda  - last colonoscopy (4/21) - normal colonoscopy, int hemorrhoids, path - colon mucosa w no signicant inflammation    Impaired fasting glucose  hyperglycemia  - Follow A1c - last 2/2022 - 5.4    Mixed hyperlipidemia  - watch diet  - calculate 10 year risk   - not on medications at present   - may need to consider medications     Hyperthyroidism  - Graves' disease now hypothyroid  - following with endocrinology  - off methimazole  - on atenolol   - increased  levothyroxine to 75 mcg (3/22)   - thyroid nodules ultrasound shows stability    Esophageal spasm  - EGD (2019) - esophageal spasm and gastritis, no gayle  - recommended haldol by GI - did not tolerate   - concern with antipsychotic, long term use adverse reactions and other drug interactions and uncertain indication. - on perphen-amitriptyline  - unclear if calcium channel blocker is contraindicated  - Marinol not covered    Post herpetic neuralgia  - on gabapentin and valtrex  - Stable    Hx of herpes zoster  - recurrent   - last exacerbation (6/2021)   - on valtrex daily suppressive     Chronic allergic rhinitis and Mild intermittent asthma without complication  - on singulair  - on flonase   - on antihistamine as needed  - stable     Vitamin D deficiency  - on vitamin D3 2000units daily   - follow labs     Primary osteoarthritis involving multiple joints  - uncertain etiology  - improved on duloxetine  - Stable     Long term use of drug    Return in about 3 months (around 6/8/2022) for check up and review. Orders Placed This Encounter   Procedures    US HEAD NECK SOFT TISSUE THYROID     This procedure can be scheduled via [facility]. Access your [facility] account by visiting [email or web address].      Standing Status:   Future     Standing Expiration Date:   3/8/2023     Order Specific Question:   Reason for exam:     Answer:   thyroid nosule follow up   Via Lester Florez MD, OB/GYN, [facility]     Referral Priority:   Routine     Referral Type:   Eval and Treat     Referral Reason:   Specialty Services Required     Referred to Provider:   Marv Banda DO     Number of Visits Requested:   1     Requested Prescriptions     Signed Prescriptions Disp Refills    ALPRAZolam (XANAX) 1 MG tablet 60 tablet 2     Sig: Take 1 tablet by mouth 2 times daily for 90 days.     valACYclovir (VALTREX) 1 g tablet 30 tablet 2     Sig: Take 1 tablet by mouth daily    montelukast (SINGULAIR) 10 MG tablet 30 tablet 2     Sig: Take 1 tablet by mouth nightly    omeprazole (PRILOSEC) 40 MG delayed release capsule 30 capsule 5     Sig: Take 1 capsule by mouth daily    DULoxetine (CYMBALTA) 60 MG extended release capsule 30 capsule 2     Sig: Take 1 capsule by mouth daily    fluticasone (FLONASE) 50 MCG/ACT nasal spray 1 each 2     Si sprays by Nasal route daily    gabapentin (NEURONTIN) 600 MG tablet 75 tablet 2     Si tab 2-3 times qd    atenolol (TENORMIN) 50 MG tablet 30 tablet 2     Sig: Take 1 tablet by mouth daily       Ken Templeton MD  3/8/2022  3:55 PM

## 2022-03-24 ENCOUNTER — TELEPHONE (OUTPATIENT)
Dept: FAMILY MEDICINE CLINIC | Age: 42
End: 2022-03-24

## 2022-03-24 NOTE — TELEPHONE ENCOUNTER
Please let the patient know that ultrasound of the head neck showed    Bilateral nodules small in nature.   No current nodule meets criteria    Thyroid otherwise appeared okay    No abnormal lymph nodes in the visualized portions of the neck    Thanks

## 2022-03-28 ENCOUNTER — TELEPHONE (OUTPATIENT)
Dept: OBGYN CLINIC | Age: 42
End: 2022-03-28

## 2022-03-28 NOTE — TELEPHONE ENCOUNTER
Dr. Yennifer Srivastava referred Shon Whelan to Dr. Oskar Clark at [facility]. Notified his office that her schedule is not open yet and we could call the patient to see if she wants to schedule at [facility]. Dr. Merle Gresham schedule is not open as yet. Called Bernarda and got her voicemail. Left message offering to schedule her at [facility] if she does not want to wait for Dr. Merle Gresham schedule to open. Asked her to call back at [phone] or at [phone] to schedule at [facility].

## 2022-04-01 ENCOUNTER — TELEPHONE (OUTPATIENT)
Dept: OBGYN CLINIC | Age: 42
End: 2022-04-01

## 2022-04-12 LAB
T3 FREE: 3.2 PG/ML (ref 2–4.4)
T4 FREE: 1.19 NG/DL (ref 0.93–1.7)
TSH SERPL DL<=0.05 MIU/L-ACNC: 1.12 UIU/ML (ref 0.27–4.2)

## 2022-04-13 DIAGNOSIS — K22.70 BARRETT'S ESOPHAGUS WITHOUT DYSPLASIA: ICD-10-CM

## 2022-04-13 DIAGNOSIS — K21.9 GASTROESOPHAGEAL REFLUX DISEASE WITHOUT ESOPHAGITIS: ICD-10-CM

## 2022-04-13 RX ORDER — CYCLOBENZAPRINE HCL 5 MG
5 TABLET ORAL 3 TIMES DAILY PRN
Qty: 60 TABLET | Refills: 2 | Status: SHIPPED
Start: 2022-04-13 | End: 2022-06-08 | Stop reason: SDUPTHER

## 2022-04-13 RX ORDER — PROMETHAZINE HYDROCHLORIDE 25 MG/1
25 TABLET ORAL EVERY 8 HOURS PRN
Qty: 40 TABLET | Refills: 2 | Status: SHIPPED
Start: 2022-04-13 | End: 2022-06-08 | Stop reason: SDUPTHER

## 2022-04-13 NOTE — TELEPHONE ENCOUNTER
Last Appointment:  3/8/2022  Future Appointments   Date Time Provider Jeanette Nielsen   5/31/2022  2:30 PM Promise Ferrer  [address]

## 2022-05-31 DIAGNOSIS — J30.9 CHRONIC ALLERGIC RHINITIS: ICD-10-CM

## 2022-05-31 DIAGNOSIS — M15.9 PRIMARY OSTEOARTHRITIS INVOLVING MULTIPLE JOINTS: ICD-10-CM

## 2022-05-31 DIAGNOSIS — B02.29 POST HERPETIC NEURALGIA: ICD-10-CM

## 2022-05-31 DIAGNOSIS — F41.1 GENERALIZED ANXIETY DISORDER: ICD-10-CM

## 2022-05-31 DIAGNOSIS — F33.1 MODERATE EPISODE OF RECURRENT MAJOR DEPRESSIVE DISORDER (HCC): ICD-10-CM

## 2022-05-31 RX ORDER — MONTELUKAST SODIUM 10 MG/1
10 TABLET ORAL NIGHTLY
Qty: 30 TABLET | Refills: 2 | Status: SHIPPED
Start: 2022-05-31 | End: 2022-09-13 | Stop reason: SDUPTHER

## 2022-05-31 RX ORDER — DULOXETIN HYDROCHLORIDE 60 MG/1
60 CAPSULE, DELAYED RELEASE ORAL DAILY
Qty: 30 CAPSULE | Refills: 2 | Status: SHIPPED
Start: 2022-05-31 | End: 2022-09-13 | Stop reason: SDUPTHER

## 2022-05-31 RX ORDER — VALACYCLOVIR HYDROCHLORIDE 1 G/1
1000 TABLET, FILM COATED ORAL DAILY
Qty: 30 TABLET | Refills: 2 | Status: SHIPPED
Start: 2022-05-31 | End: 2022-09-13 | Stop reason: SDUPTHER

## 2022-06-01 DIAGNOSIS — F33.1 MODERATE EPISODE OF RECURRENT MAJOR DEPRESSIVE DISORDER (HCC): ICD-10-CM

## 2022-06-01 DIAGNOSIS — F41.1 GENERALIZED ANXIETY DISORDER: ICD-10-CM

## 2022-06-01 RX ORDER — ALPRAZOLAM 1 MG/1
1 TABLET ORAL 2 TIMES DAILY
Qty: 60 TABLET | Refills: 2 | Status: SHIPPED
Start: 2022-06-01 | End: 2022-08-24 | Stop reason: SDUPTHER

## 2022-06-01 NOTE — TELEPHONE ENCOUNTER
Last Appointment:  3/8/2022  Future Appointments   Date Time Provider Jeanette Skaggsi   6/8/2022  3:00 PM Joanna Ivey  [address] [place] [address]

## 2022-06-06 DIAGNOSIS — B02.29 POST HERPETIC NEURALGIA: ICD-10-CM

## 2022-06-06 DIAGNOSIS — J30.9 CHRONIC ALLERGIC RHINITIS: ICD-10-CM

## 2022-06-06 RX ORDER — FLUTICASONE PROPIONATE 50 MCG
2 SPRAY, SUSPENSION (ML) NASAL DAILY
Qty: 1 EACH | Refills: 2 | Status: SHIPPED
Start: 2022-06-06 | End: 2022-08-31 | Stop reason: SDUPTHER

## 2022-06-06 RX ORDER — GABAPENTIN 600 MG/1
TABLET ORAL
Qty: 75 TABLET | Refills: 2 | Status: SHIPPED
Start: 2022-06-06 | End: 2022-09-25 | Stop reason: SDUPTHER

## 2022-06-06 NOTE — TELEPHONE ENCOUNTER
Last Appointment:  3/8/2022  Future Appointments   Date Time Provider Jeanette Nielsen   6/8/2022  3:00 PM Promise Ferrer  [address] [facility] [address]

## 2022-06-08 ENCOUNTER — OFFICE VISIT (OUTPATIENT)
Dept: FAMILY MEDICINE CLINIC | Age: 42
End: 2022-06-08
Payer: COMMERCIAL

## 2022-06-08 VITALS
DIASTOLIC BLOOD PRESSURE: 86 MMHG | WEIGHT: 225 LBS | SYSTOLIC BLOOD PRESSURE: 130 MMHG | HEART RATE: 102 BPM | RESPIRATION RATE: 18 BRPM | TEMPERATURE: 97.7 F | OXYGEN SATURATION: 99 % | BODY MASS INDEX: 39.87 KG/M2 | HEIGHT: 63 IN

## 2022-06-08 DIAGNOSIS — R73.01 IMPAIRED FASTING GLUCOSE: ICD-10-CM

## 2022-06-08 DIAGNOSIS — K21.9 GASTROESOPHAGEAL REFLUX DISEASE WITHOUT ESOPHAGITIS: ICD-10-CM

## 2022-06-08 DIAGNOSIS — K22.70 BARRETT'S ESOPHAGUS WITHOUT DYSPLASIA: ICD-10-CM

## 2022-06-08 DIAGNOSIS — E78.2 MIXED HYPERLIPIDEMIA: ICD-10-CM

## 2022-06-08 DIAGNOSIS — M25.561 ACUTE PAIN OF RIGHT KNEE: Primary | ICD-10-CM

## 2022-06-08 DIAGNOSIS — B02.29 POST HERPETIC NEURALGIA: ICD-10-CM

## 2022-06-08 DIAGNOSIS — E05.90 HYPERTHYROIDISM: ICD-10-CM

## 2022-06-08 DIAGNOSIS — R21 RASH AND NONSPECIFIC SKIN ERUPTION: ICD-10-CM

## 2022-06-08 DIAGNOSIS — K22.4 ESOPHAGEAL SPASM: ICD-10-CM

## 2022-06-08 DIAGNOSIS — F33.1 MODERATE EPISODE OF RECURRENT MAJOR DEPRESSIVE DISORDER (HCC): ICD-10-CM

## 2022-06-08 DIAGNOSIS — J30.9 CHRONIC ALLERGIC RHINITIS: ICD-10-CM

## 2022-06-08 DIAGNOSIS — R00.0 TACHYCARDIA: ICD-10-CM

## 2022-06-08 DIAGNOSIS — E55.9 VITAMIN D DEFICIENCY: ICD-10-CM

## 2022-06-08 DIAGNOSIS — F41.1 GENERALIZED ANXIETY DISORDER: ICD-10-CM

## 2022-06-08 DIAGNOSIS — K52.9 COLITIS: ICD-10-CM

## 2022-06-08 PROCEDURE — 1036F TOBACCO NON-USER: CPT | Performed by: INTERNAL MEDICINE

## 2022-06-08 PROCEDURE — G8427 DOCREV CUR MEDS BY ELIG CLIN: HCPCS | Performed by: INTERNAL MEDICINE

## 2022-06-08 PROCEDURE — G8417 CALC BMI ABV UP PARAM F/U: HCPCS | Performed by: INTERNAL MEDICINE

## 2022-06-08 PROCEDURE — 99214 OFFICE O/P EST MOD 30 MIN: CPT | Performed by: INTERNAL MEDICINE

## 2022-06-08 RX ORDER — HYOSCYAMINE SULFATE EXTENDED-RELEASE 0.38 MG/1
TABLET ORAL
COMMUNITY
Start: 2022-03-30

## 2022-06-08 RX ORDER — PROMETHAZINE HYDROCHLORIDE 25 MG/1
25 TABLET ORAL EVERY 8 HOURS PRN
Qty: 40 TABLET | Refills: 2 | Status: SHIPPED
Start: 2022-06-08 | End: 2022-08-10 | Stop reason: SDUPTHER

## 2022-06-08 RX ORDER — CYCLOBENZAPRINE HCL 5 MG
5 TABLET ORAL 3 TIMES DAILY PRN
Qty: 60 TABLET | Refills: 2 | Status: SHIPPED
Start: 2022-06-08 | End: 2022-08-10 | Stop reason: SDUPTHER

## 2022-06-08 ASSESSMENT — PATIENT HEALTH QUESTIONNAIRE - PHQ9
1. LITTLE INTEREST OR PLEASURE IN DOING THINGS: 1
SUM OF ALL RESPONSES TO PHQ9 QUESTIONS 1 & 2: 2
2. FEELING DOWN, DEPRESSED OR HOPELESS: 1
7. TROUBLE CONCENTRATING ON THINGS, SUCH AS READING THE NEWSPAPER OR WATCHING TELEVISION: 0
SUM OF ALL RESPONSES TO PHQ9 QUESTIONS 1 & 2: 2
6. FEELING BAD ABOUT YOURSELF - OR THAT YOU ARE A FAILURE OR HAVE LET YOURSELF OR YOUR FAMILY DOWN: 1
SUM OF ALL RESPONSES TO PHQ QUESTIONS 1-9: 6
SUM OF ALL RESPONSES TO PHQ QUESTIONS 1-9: 6
4. FEELING TIRED OR HAVING LITTLE ENERGY: 3
5. POOR APPETITE OR OVEREATING: 0
9. THOUGHTS THAT YOU WOULD BE BETTER OFF DEAD, OR OF HURTING YOURSELF: 0
10. IF YOU CHECKED OFF ANY PROBLEMS, HOW DIFFICULT HAVE THESE PROBLEMS MADE IT FOR YOU TO DO YOUR WORK, TAKE CARE OF THINGS AT HOME, OR GET ALONG WITH OTHER PEOPLE: 0
1. LITTLE INTEREST OR PLEASURE IN DOING THINGS: SEVERAL DAYS
SUM OF ALL RESPONSES TO PHQ QUESTIONS 1-9: 6
8. MOVING OR SPEAKING SO SLOWLY THAT OTHER PEOPLE COULD HAVE NOTICED. OR THE OPPOSITE, BEING SO FIGETY OR RESTLESS THAT YOU HAVE BEEN MOVING AROUND A LOT MORE THAN USUAL: 0
3. TROUBLE FALLING OR STAYING ASLEEP: 0
2. FEELING DOWN, DEPRESSED OR HOPELESS: SEVERAL DAYS
DEPRESSION UNABLE TO ASSESS: YES
SUM OF ALL RESPONSES TO PHQ QUESTIONS 1-9: 6

## 2022-06-08 ASSESSMENT — ENCOUNTER SYMPTOMS
SORE THROAT: 0
CONSTIPATION: 0
SHORTNESS OF BREATH: 0
VOMITING: 0
NAUSEA: 0
RHINORRHEA: 0
CHEST TIGHTNESS: 0
DIARRHEA: 0
EYE PAIN: 0
BLOOD IN STOOL: 0
ABDOMINAL PAIN: 0
COUGH: 0

## 2022-06-08 NOTE — PROGRESS NOTES
[facility]) Physicians   Internal Medicine     2022  Lyssa Peterson : 1980 Sex: female  Age:41 y.o. Chief Complaint   Patient presents with    Follow-up    Depression    Anxiety    Hypertension    Thyroid Problem    Knee Pain     right knee        HPI:   Patient presents to office for evaluation of the following medical concerns. - states having knee pain. States has been gardening. States having pain behind right knee. States worse with stairs both walking up and down. Occasionally feels as if going to give out.    - States to see dermatology. States lesion to forehead. States to be see in [employer]. - States has been having increased heart rate and blood pressure. States has been having diaphoresis. Now on atenolol 50mg twice a day. Was referred to cardiology, did not schedule. - States anxiety is stable. On duloxetine and on xanax. States increased taking xanax twice a day. States also helps with sleeping. Did not go to counseling. No Suicidal thoughts. No reported side effects with medications. States meds are helping symptoms. Last PHQ score of 6 (2022)     - States shingles, recurrent. Last exacerbation (2021) - increased valtrx to tid x 1 week, back to daily. Infectious disease recommended valtrex daily for suppressive treatment. States taking gabapentin 600mg twice a day for post herpetic neuralgia. Not currently following with ID. Stable. - Has impaired fasting glucose. Following with endocrinology. States A1c was good at 5.4 (2022)     - States GERD, states on omeprazole - had EGD and colonoscopy - Sosa esophagus on (2016), follow up 2019 - no Sosa - states esophageal spasm and gastritis. Now on perphen-amitriptyline. On phenergan as needed. EGD () - gastric polyps, otherwise normal, path fundic gland polyp    - States having severe IBS with diarrhea, possible ulcerative colitis. Stopped Lialda. No abdo pain.  No current bowel urgency, bleeding. States bloating and gas. No fever or chills. States follows with GI. States on Viberzi twice a day or once a day. Last colonoscopy (4/21) - normal colonoscopy, int hemorrhoids, path - colon mucosa w no signicant inflammation. continue Viberzi, Phenergan, perphemazine and amitriptyline. Last visit with GI per reviewed note (11/21) - no change f/u 1yr. Added hycosamine     - States allergy and sinus symptoms have improved and stable. On  Flonase. On Singulair States taking loratadine as needed. - States back pain is stable. States comes and goes. States gabapentin has helped. - States following with endocrinology for graves disease. States methimazole stopped. State on atenolol 50mg alternating with 25mg. Started on levothyroxine 50mcg daily. Last endo visit per reviewed note (3/22) -Graves' disease now hypothyroid increase levothyroxine to 75 mcg recheck labs 6 weeks impaired fasting glucose A1c 5.4 thyroid nodules ultrasound shows stability      - States insomnia has improved with xanax and duloxetine    - States had childhood asthma. States singular has helped. States loratadine and ProAir as needed. States no wheezing or SOB. - States has seen gyn and placed on OCP for irregular menstrual cycles, last visit (11/2020)     optho (11/21) -spots on the right side of the head Romycin dexamethasone in the eyes to the right eye feel conjunctivitis not herpetic given Polytrim 4 times a day for 7 days. Still with spots to right head. Health Maintenance   - immunizations:   Influenza Vaccination  - declines  Pneumonia Vaccination  Zoster/Shingles Vaccine  Tetanus Vaccination - (2011)   covid - declined     - Screenings:   Bone Density Scan - (2/8/2017)  Pelvic/Pap Exam - (2017), (2019) - gyn  Mammogram (10/21) - No mammographic evidence of malignancy.     Colonoscopy - (8/2016) - polyp, hyperplastic, internal hemorrhoids, (1/2019) - tortuous colon, colon spasm, diverticulosis, No UC seen, (4/21) - normal colonoscopy, int hemorrhoids, path - colon mucosa w no signicant inflammation     EGD - (8/2016) - Sosa's esophagus, gastric polyps, (1/2019), Mild to mod chronic active gastritis - esophageal spasm, (4/21) - gastric polyps, otherwise normal, path fundic gland polyp    ROS:  Review of Systems   Constitutional: Negative for appetite change, chills, fever and unexpected weight change. HENT: Negative for congestion, rhinorrhea and sore throat. Eyes: Negative for pain and visual disturbance. Respiratory: Negative for cough, chest tightness and shortness of breath. Cardiovascular: Negative for chest pain and palpitations. Gastrointestinal: Negative for abdominal pain, blood in stool, constipation, diarrhea, nausea and vomiting. Genitourinary: Negative for difficulty urinating, dysuria, frequency, pelvic pain, urgency and vaginal bleeding. Musculoskeletal: Negative for arthralgias and myalgias. Skin: Negative for rash. Neurological: Negative for dizziness, syncope, weakness, light-headedness, numbness and headaches. Hematological: Negative for adenopathy. Psychiatric/Behavioral: Negative for suicidal ideas. The patient is not nervous/anxious.           Current Outpatient Medications:     hyoscyamine (LEVBID) 375 MCG extended release tablet, take 1 tablet by mouth every 12 hours, Disp: , Rfl:     cyclobenzaprine (FLEXERIL) 5 MG tablet, Take 1 tablet by mouth 3 times daily as needed for Muscle spasms, Disp: 60 tablet, Rfl: 2    promethazine (PHENERGAN) 25 MG tablet, Take 1 tablet by mouth every 8 hours as needed for Nausea 1-2 tabs q 4 hrs prn, Disp: 40 tablet, Rfl: 2    diclofenac sodium (VOLTAREN) 1 % GEL, Apply 4 g topically 4 times daily, Disp: 2 g, Rfl: 1    fluticasone (FLONASE) 50 MCG/ACT nasal spray, 2 sprays by Nasal route daily, Disp: 1 each, Rfl: 2    gabapentin (NEURONTIN) 600 MG tablet, 1 tab 2-3 times qd, Disp: 75 tablet, Rfl: 2    ALPRAZolam (XANAX) 1 MG Surgical History:   Procedure Laterality Date    NASAL SEPTUM SURGERY      biopsy     WISDOM TOOTH EXTRACTION         Family History   Problem Relation Age of Onset    Osteoarthritis Mother     Elevated Lipids Mother     Heart Disease Father     Other Father         back pain     Elevated Lipids Father     Colon Cancer Father     Colon Cancer Maternal Grandmother     Breast Cancer Maternal Aunt     Breast Cancer Paternal Aunt        Social History     Socioeconomic History    Marital status:      Spouse name: Not on file    Number of children: Not on file    Years of education: Not on file    Highest education level: Not on file   Occupational History    Not on file   Tobacco Use    Smoking status: Former Smoker     Packs/day: 0.50     Years: 1.50     Pack years: 0.75     Types: Cigarettes     Quit date: 1999     Years since quittin.8    Smokeless tobacco: Never Used   Substance and Sexual Activity    Alcohol use: Yes     Comment: socially     Drug use: Not on file    Sexual activity: Not on file   Other Topics Concern    Not on file   Social History Narrative    Not on file     Social Determinants of Health     Financial Resource Strain: Low Risk     Difficulty of Paying Living Expenses: Not hard at all   Food Insecurity: No Food Insecurity    Worried About Running Out of Food in the Last Year: Never true    [address] in the Last Year: Never true   Transportation Needs:     Lack of Transportation (Medical): Not on file    Lack of Transportation (Non-Medical):  Not on file   Physical Activity:     Days of Exercise per Week: Not on file    Minutes of Exercise per Session: Not on file   Stress:     Feeling of Stress : Not on file   Social Connections:     Frequency of Communication with Friends and Family: Not on file    Frequency of Social Gatherings with Friends and Family: Not on file    Attends Holiness Services: Not on file   [employer]s or Organizations: Not on file    Attends Club or Organization Meetings: Not on file    Marital Status: Not on file   Intimate Partner Violence:     Fear of Current or Ex-Partner: Not on file    Emotionally Abused: Not on file    Physically Abused: Not on file    Sexually Abused: Not on file   Housing Stability:     Unable to Pay for Housing in the Last Year: Not on file    Number of [place] in the Last Year: Not on file    Unstable Housing in the Last Year: Not on file       Vitals:    06/08/22 1501   BP: 130/86   Pulse: (!) 102   Resp: 18   Temp: 97.7 °F (36.5 °C)   TempSrc: Temporal   SpO2: 99%   Weight: 225 lb (102.1 kg)   Height: 5' 3\" (1.6 m)       Exam:  Physical Exam  Constitutional:       Appearance: She is well-developed. HENT:      Head: Normocephalic and atraumatic. Right Ear: External ear normal.      Left Ear: External ear normal.   Eyes:      Pupils: Pupils are equal, round, and reactive to light. Neck:      Thyroid: No thyromegaly. Cardiovascular:      Rate and Rhythm: Normal rate and regular rhythm. Heart sounds: Normal heart sounds. No murmur heard. Pulmonary:      Effort: Pulmonary effort is normal.      Breath sounds: Normal breath sounds. No wheezing. Abdominal:      General: Bowel sounds are normal. There is no distension. Palpations: Abdomen is soft. Tenderness: There is no abdominal tenderness. There is no guarding or rebound. Musculoskeletal:         General: Normal range of motion. Cervical back: Normal range of motion and neck supple. Lymphadenopathy:      Cervical: No cervical adenopathy. Skin:     General: Skin is warm and dry. Neurological:      Mental Status: She is alert and oriented to person, place, and time. Cranial Nerves: No cranial nerve deficit.    Psychiatric:         Judgment: Judgment normal.         Assessment and Plan:    Diagnoses and all orders for this visit:    Acute pain of right knee  - poss strain and sprain   - alternate heat and ice   - compression - knee sleeve   - home exercises  - check US LE     Rash and nonspecific skin eruption  - location to scalp, uncertain as to cause   - to see dermatology     Tachycardia  - uncertain etiology   - has hx of graves and would recheck labs   - referral to cardiology  - declined   - increased atenolol to 50mg twice a day   - improved after medication change     Moderate episode of recurrent major depressive disorder (HCC)  - off paxil  - on duloxetine given helps also with joint pain at 60mg  - stable     Generalized anxiety disorder  - off paxil  - on duloxetine given helps also with joint pain at 60mg  - on xanax. Now increased to twice a day, asking for furthe rincrease   - suggest counseling - declines  - uncontrolled   - recommend referral - placed     [place]AR[place] report reviewed (6/8/2022)  controlled substance agreement updated (2/25/20)    Patient requests benzodiazepine medication refill. I have reviewed the current medications and prior notes regarding the need for these refills. I believe the need for refill is warranted at this time. I have reviewed the OARRS report and found no suspicion of drug seeking behavior. I have discussed the side effects and narcotic/benzodiazepine policy with this patient and the patient has demonstrated understanding. A follow up appointment will be scheduled with me    Controlled Substance Monitoring:    Acute and Chronic Pain Monitoring:   RX Monitoring 3/8/2022   Periodic Controlled Substance Monitoring Possible medication side effects, risk of tolerance/dependence & alternative treatments discussed. ;No signs of potential drug abuse or diversion identified.        Gayle's esophagus without dysplasia  - last EGD 4/2021- no gayle seen, gastric polyps  - on omeprazole  - following with GI  - stable     Gastroesophageal reflux disease without esophagitis  - watch diet  - on omeprazole   - discussed long term use side effects  - recommended haldol by GI - did not tolerate side effects   - on perphen-amitriptyline for spasm   - last EGD 4/2021- no gayle seen, gastric polyps  - stable     Colitis - poss Ulcerative colitis with complication, unspecified location [facility])  - following up with GI  - uncertain etiology   - stopped lialda  - last colonoscopy (4/21) - normal colonoscopy, int hemorrhoids, path - colon mucosa w no signicant inflammation    Impaired fasting glucose  hyperglycemia  - Follow A1c - last 2/2022 - 5.4    Mixed hyperlipidemia  - watch diet  - calculate 10 year risk   - not on medications at present   - may need to consider medications     Hyperthyroidism  - Graves' disease now hypothyroid  - following with endocrinology  - off methimazole  - on atenolol   - increased  levothyroxine to 75 mcg (3/22)   - thyroid nodules ultrasound shows stability    Esophageal spasm  - EGD (2019) - esophageal spasm and gastritis, no gayle  - recommended haldol by GI - did not tolerate   - concern with antipsychotic, long term use adverse reactions and other drug interactions and uncertain indication. - on perphen-amitriptyline  - unclear if calcium channel blocker is contraindicated  - Marinol not covered    Post herpetic neuralgia  - on gabapentin and valtrex  - Stable    Hx of herpes zoster  - recurrent   - last exacerbation (6/2021)   - on valtrex daily suppressive     Chronic allergic rhinitis and Mild intermittent asthma without complication  - on singulair  - on flonase   - on antihistamine as needed  - stable     Vitamin D deficiency  - on vitamin D3 2000units daily   - follow labs     Primary osteoarthritis involving multiple joints  - uncertain etiology  - improved on duloxetine  - Stable     Long term use of drug    Return in about 3 months (around 9/8/2022) for check up and review.     Orders Placed This Encounter   Procedures    US DUP LOWER EXTREMITY RIGHT GIL     Standing Status:   Future     Standing Expiration Date: 6/8/2023     Order Specific Question:   Reason for exam:     Answer:   posterior lateral knee pain    XR KNEE RIGHT (MIN 4 VIEWS)     Standing Status:   Future     Standing Expiration Date:   6/8/2023     Order Specific Question:   Reason for exam:     Answer:   posterior lateral knee pain    CBC with Auto Differential     Standing Status:   Future     Standing Expiration Date:   6/8/2023    Comprehensive Metabolic Panel     Standing Status:   Future     Standing Expiration Date:   6/8/2023    Magnesium     Standing Status:   Future     Standing Expiration Date:   6/8/2023    Lipid, Fasting     Standing Status:   Future     Standing Expiration Date:   6/8/2023    Hemoglobin A1C     Standing Status:   Future     Standing Expiration Date:   6/8/2023    Vitamin D 25 Hydroxy     Standing Status:   Future     Standing Expiration Date:   6/8/2023    Urinalysis     Standing Status:   Future     Standing Expiration Date:   6/8/2023     Requested Prescriptions     Signed Prescriptions Disp Refills    cyclobenzaprine (FLEXERIL) 5 MG tablet 60 tablet 2     Sig: Take 1 tablet by mouth 3 times daily as needed for Muscle spasms    promethazine (PHENERGAN) 25 MG tablet 40 tablet 2     Sig: Take 1 tablet by mouth every 8 hours as needed for Nausea 1-2 tabs q 4 hrs prn    diclofenac sodium (VOLTAREN) 1 % GEL 2 g 1     Sig: Apply 4 g topically 4 times daily       Promise Ferrer MD  6/8/2022  3:30 PM

## 2022-06-08 NOTE — PATIENT INSTRUCTIONS
Patient Education        Knee: Exercises  Introduction  Here are some examples of exercises for you to try. The exercises may be suggested for a condition or for rehabilitation. Start each exercise slowly. Ease off the exercises if you start to have pain. You will be told when to start these exercises and which ones will work bestfor you. How to do the exercises  Quad sets    1. Sit with your leg straight and supported on the floor or a firm bed. (If you feel discomfort in the front or back of your knee, place a small towel roll under your knee.)  2. Tighten the muscles on top of your thigh by pressing the back of your knee flat down to the floor. (If you feel discomfort under your kneecap, place a small towel roll under your knee.)  3. Hold for about 6 seconds, then rest for up to 10 seconds. 4. Do 8 to 12 repetitions several times a day. Straight-leg raises to the front    1. Lie on your back with your good knee bent so that your foot rests flat on the floor. Your injured leg should be straight. Make sure that your low back has a normal curve. You should be able to slip your flat hand in between the floor and the small of your back, with your palm touching the floor and your back touching the back of your hand. 2. Tighten the thigh muscles in the injured leg by pressing the back of your knee flat down to the floor. Hold your knee straight. 3. Keeping the thigh muscles tight, lift your injured leg up so that your heel is about 12 inches off the floor. Hold for about 6 seconds and then lower slowly. 4. Do 8 to 12 repetitions, 3 times a day. Straight-leg raises to the outside    1. Lie on your side, with your injured leg on top. 2. Tighten the front thigh muscles of your injured leg to keep your knee straight. 3. Keep your hip and your leg straight in line with the rest of your body, and keep your knee pointing forward. Do not drop your hip back.   4. Lift your injured leg straight up toward the ceiling, about 12 inches off the floor. Hold for about 6 seconds, then slowly lower your leg. 5. Do 8 to 12 repetitions. Straight-leg raises to the back    1. Lie on your stomach, and lift your leg straight up behind you (toward the ceiling). 2. Lift your toes about 6 inches off the floor, hold for about 6 seconds, then lower slowly. 3. Do 8 to 12 repetitions. Straight-leg raises to the inside    1. Lie on the side of your body with the injured leg. 2. You can either prop your other (good) leg up on a chair, or you can bend your good knee and put that foot in front of your injured knee. Do not drop your hip back. 3. Tighten the muscles on the front of your thigh to straighten your injured knee. 4. Keep your kneecap pointing forward, and lift your whole leg up toward the ceiling about 6 inches. Hold for about 6 seconds, then lower slowly. 5. Do 8 to 12 repetitions. Heel dig bridging    1. Lie on your back with both knees bent and your ankles bent so that only your heels are digging into the floor. Your knees should be bent about 90 degrees. 2. Then push your heels into the floor, squeeze your buttocks, and lift your hips off the floor until your shoulders, hips, and knees are all in a straight line. 3. Hold for about 6 seconds as you continue to breathe normally, and then slowly lower your hips back down to the floor and rest for up to 10 seconds. 4. Do 8 to 12 repetitions. Hamstring curls    1. Lie on your stomach with your knees straight. If your kneecap is uncomfortable, roll up a washcloth and put it under your leg just above your kneecap. 2. Lift the foot of your injured leg by bending the knee so that you bring the foot up toward your buttock. If this motion hurts, try it without bending your knee quite as far. This may help you avoid any painful motion. 3. Slowly lower your leg back to the floor. 4. Do 8 to 12 repetitions.   5. With permission from your doctor or physical therapist, you may also want to add a cuff weight to your ankle (not more than 5 pounds). With weight, you do not have to lift your leg more than 12 inches to get a hamstring workout. Shallow standing knee bends    Do this exercise only if you have very little pain; if you have no clicking, locking, or giving way if you have an injured knee; and if it does not hurtwhile you are doing 8 to 12 repetitions. 1. Stand with your hands lightly resting on a counter or chair in front of you. Put your feet shoulder-width apart. 2. Slowly bend your knees so that you squat down like you are going to sit in a chair. Make sure your knees do not go in front of your toes. 3. Lower yourself about 6 inches. Your heels should remain on the floor at all times. 4. Rise slowly to a standing position. Heel raises    1. Stand with your feet a few inches apart, with your hands lightly resting on a counter or chair in front of you. 2. Slowly raise your heels off the floor while keeping your knees straight. 3. Hold for about 6 seconds, then slowly lower your heels to the floor. 4. Do 8 to 12 repetitions several times during the day. Follow-up care is a key part of your treatment and safety. Be sure to make and go to all appointments, and call your doctor if you are having problems. It's also a good idea to know your test results and keep alist of the medicines you take. Where can you learn more? Go to https://"[employer] org and sign in to your [employer] account. Enter B249 in the [facility] box to learn more about \"Knee: Exercises. \"     If you do not have an account, please click on the \"Sign Up Now\" link. Current as of: July 1, 2021               Content Version: 13.2  © 2006-2022 [employer]. Care instructions adapted under license by [facility] ([facility]).  If you have questions about a medical condition or this instruction, always ask your healthcare professional. Roberto Shaikh disclaims any warranty or liability for your use of this information.

## 2022-06-09 ENCOUNTER — TELEPHONE (OUTPATIENT)
Dept: FAMILY MEDICINE CLINIC | Age: 42
End: 2022-06-09

## 2022-06-09 NOTE — TELEPHONE ENCOUNTER
Please let the patient know that x-ray of the right knee per radiology report suggested mild osteoarthritis no fracture or dislocation and soft tissues per x-ray appeared Normal    Thanks

## 2022-06-19 ENCOUNTER — TELEPHONE (OUTPATIENT)
Dept: FAMILY MEDICINE CLINIC | Age: 42
End: 2022-06-19

## 2022-06-19 NOTE — TELEPHONE ENCOUNTER
Please let the patient know that ultrasound of the right lower extremity per radiology report was negative for blood clots    Thanks

## 2022-06-20 NOTE — TELEPHONE ENCOUNTER
Kathryn message for Osei Adjutant letteing her know that I will send the results to her MyChart. If she is not able to log in she can call the office for the results.

## 2022-07-21 ENCOUNTER — OFFICE VISIT (OUTPATIENT)
Dept: ORTHOPEDIC SURGERY | Age: 42
End: 2022-07-21
Payer: COMMERCIAL

## 2022-07-21 VITALS — HEIGHT: 63 IN | BODY MASS INDEX: 38.98 KG/M2 | WEIGHT: 220 LBS

## 2022-07-21 DIAGNOSIS — M25.561 RIGHT KNEE PAIN, UNSPECIFIED CHRONICITY: Primary | ICD-10-CM

## 2022-07-21 PROCEDURE — 99203 OFFICE O/P NEW LOW 30 MIN: CPT | Performed by: NURSE PRACTITIONER

## 2022-07-21 RX ORDER — KETOCONAZOLE 20 MG/ML
SHAMPOO TOPICAL
COMMUNITY
Start: 2022-06-28

## 2022-07-21 RX ORDER — CLINDAMYCIN PHOSPHATE 11.9 MG/ML
SOLUTION TOPICAL
COMMUNITY
Start: 2022-06-28 | End: 2022-08-31

## 2022-07-21 NOTE — PATIENT INSTRUCTIONS
MRI order was placed today, please call the office once you are scheduled for your MRI for a review with the physician. If you do not hear from radiology scheduling within 1 week, please call [phone] and press option #2.

## 2022-07-21 NOTE — PROGRESS NOTES
New Knee Patient     Referring Provider:   Abbie Polo MD  [facility],  [address] [facility]    CHIEF COMPLAINT:   Chief Complaint   Patient presents with    Knee Pain     Rt knee pain, she states a couple months, c/o knee swelling , she noticed more after kneeling / planting garden, pain is mostly posterior, crossing the leg and stairs are bothersome    Results     Rt knee XR         HPI:    Mima Almeida is a 35y.o. year old female who is seen today  for evaluation of right knee pain. Patient reports onset of symptoms over the last several months without known injury. She reports pain after kneeling/crossing her legs or when ambulating stairs. She denies mechanical symptoms. Reports some intermittent feelings of instability. Previous treatments have included home exercise regimen provided by her PCP however she states that due to worsening pain she has been unable to complete the exercises independently. Patient is working currently as a . She denies history of right knee pain or past injuries.       PAST MEDICAL HISTORY  Past Medical History:   Diagnosis Date    Asthma     Sosa's esophagus without dysplasia 7/23/2019    Chronic allergic rhinitis 7/23/2019    Colitis 7/23/2019    Depression     Esophageal spasm 7/23/2019    Gastroesophageal reflux disease without esophagitis 7/23/2019    Generalized anxiety disorder 7/23/2019    Hyperthyroidism 7/23/2019    Impaired fasting glucose 7/23/2019    Insomnia     Irritable bowel syndrome     Seasonal allergies     Ulcerative colitis ([address]        PAST SURGICAL HISTORY  Past Surgical History:   Procedure Laterality Date    NASAL SEPTUM SURGERY      biopsy     WISDOM TOOTH EXTRACTION           FAMILY HISTORY   Family History   Problem Relation Age of Onset    Osteoarthritis Mother     Elevated Lipids Mother     Heart Disease Father     Other Father         back pain     Elevated Lipids Father     Colon Cancer Father     Colon Cancer Maternal (LEVBID) 375 MCG extended release tablet, take 1 tablet by mouth every 12 hours, Disp: , Rfl:     cyclobenzaprine (FLEXERIL) 5 MG tablet, Take 1 tablet by mouth 3 times daily as needed for Muscle spasms, Disp: 60 tablet, Rfl: 2    promethazine (PHENERGAN) 25 MG tablet, Take 1 tablet by mouth every 8 hours as needed for Nausea 1-2 tabs q 4 hrs prn, Disp: 40 tablet, Rfl: 2    diclofenac sodium (VOLTAREN) 1 % GEL, Apply 4 g topically 4 times daily, Disp: 2 g, Rfl: 1    fluticasone (FLONASE) 50 MCG/ACT nasal spray, 2 sprays by Nasal route daily, Disp: 1 each, Rfl: 2    gabapentin (NEURONTIN) 600 MG tablet, 1 tab 2-3 times qd, Disp: 75 tablet, Rfl: 2    ALPRAZolam (XANAX) 1 MG tablet, Take 1 tablet by mouth 2 times daily for 90 days. , Disp: 60 tablet, Rfl: 2    DULoxetine (CYMBALTA) 60 MG extended release capsule, Take 1 capsule by mouth daily, Disp: 30 capsule, Rfl: 2    montelukast (SINGULAIR) 10 MG tablet, Take 1 tablet by mouth nightly, Disp: 30 tablet, Rfl: 2    valACYclovir (VALTREX) 1 g tablet, Take 1 tablet by mouth daily, Disp: 30 tablet, Rfl: 2    atenolol (TENORMIN) 50 MG tablet, Take 1 tablet by mouth 2 times daily, Disp: 60 tablet, Rfl: 2    omeprazole (PRILOSEC) 40 MG delayed release capsule, Take 1 capsule by mouth daily, Disp: 30 capsule, Rfl: 5    loratadine (CLARITIN) 10 MG tablet, Take 1 tablet by mouth daily as needed (allergies), Disp: 30 tablet, Rfl: 5    Levothyroxine Sodium 75 MCG CAPS, Take 75 mcg by mouth Daily , Disp: , Rfl:     albuterol sulfate  (90 Base) MCG/ACT inhaler, Inhale 2 puffs into the lungs every 4 hours as needed for Wheezing, Disp: 1 Inhaler, Rfl: 3    acetaminophen (TYLENOL) 500 MG tablet, Take 500 mg by mouth every 6 hours as needed for Pain, Disp: , Rfl:     amitriptyline (ELAVIL) 10 MG tablet, Take 10 mg by mouth nightly , Disp: , Rfl:     perphenazine 2 MG tablet, Take 2 mg by mouth daily , Disp: , Rfl:     CAMRESE LO 0.1-0.02 & 0.01 MG TABS, , Disp: , Rfl: 0 VIBERZI 100 MG TABS, 1 tablet 2 times daily. , Disp: , Rfl: 0    ALLERGIES  Allergies   Allergen Reactions    Latex     Ibuprofen Other (See Comments)     NSAIDs trigger colitis        Controlled Substances Monitoring:          REVIEW OF SYSTEMS:     General: Negative Fever, chills, fatigue   Cardiovascular: Negative chest pain, palpitations  Respiratory: Equal chest expansion, negative shortness of breath   Skin: Negative rash, open wounds  Psych: Normal affect, mood stable  Neurologic: sensation grossly intact in extremities   Musculoskeletal: See HPI      PHYSICAL EXAM     Vitals:    07/21/22 1307   Weight: 220 lb (99.8 kg)   Height: 5' 3\" (1.6 m)       Height: 5' 3\" (1.6 m)  Weight: 220 lb per pt  BMI:  Body mass index is 38.97 kg/m². General: The patient is alert and oriented x 3, appears to be stated age and in no distress. HEENT: head is normocephalic, atraumatic. EOMI. Neck: supple, trachea midline, no thyromegaly   Cardiovascular: peripheral pulses palpable. Normal Capillary refill   Respiratory: breathing unlabored, chest expansion symmetric   Skin: no rash, no open wounds, no erythema  Psych: normal affect; mood stable  Neurologic: gait normal, sensation grossly intact in extremities  MSK:        Lower Extremity:   Ipsilateral hip exam shows normal range of motion without pain with impingement testing. Right knee exam full range of motion, valgus varus stress intact at 0 and 30 degrees. Posterior drawer and Lachman intact. There is lateral joint line pain with Apley grind test.  Posterior lateral tenderness on palpation. No swelling or deformity visualized. IMAGING:    Previous x-rays of the right knee reviewed showing maintained medial lateral compartments with very minimal degenerative changes to the patellofemoral joint      ASSESSMENT  Acute right knee pain    PLAN  Today we discussed her right knee. She reports onset of symptoms over the last couple months without injury.   She is reporting occasional feelings of instability. Patient reports pain to the posterior lateral corner of her knee. On exam she has full range of motion with stable ligament exam.  She did have lateral joint line pain with Apley grind test.  Treatment options discussed included physical therapy, OTC medications and MRI. Patient wished to proceed with an MRI of her right knee, order was placed. She will follow-up once MRI is completed.       Rosana Kenyon CNP  Orthopaedic Surgery   7/21/22  1:10 PM

## 2022-08-08 ENCOUNTER — HOSPITAL ENCOUNTER (OUTPATIENT)
Dept: MRI IMAGING | Age: 42
Discharge: HOME | End: 2022-08-10
Payer: COMMERCIAL

## 2022-08-08 DIAGNOSIS — M25.561 RIGHT KNEE PAIN, UNSPECIFIED CHRONICITY: ICD-10-CM

## 2022-08-08 PROCEDURE — 73721 MRI JNT OF LWR EXTRE W/O DYE: CPT

## 2022-08-10 DIAGNOSIS — K22.70 BARRETT'S ESOPHAGUS WITHOUT DYSPLASIA: ICD-10-CM

## 2022-08-10 DIAGNOSIS — K21.9 GASTROESOPHAGEAL REFLUX DISEASE WITHOUT ESOPHAGITIS: ICD-10-CM

## 2022-08-10 RX ORDER — PROMETHAZINE HYDROCHLORIDE 25 MG/1
25 TABLET ORAL EVERY 8 HOURS PRN
Qty: 40 TABLET | Refills: 2 | Status: SHIPPED
Start: 2022-08-10 | End: 2022-10-10 | Stop reason: SDUPTHER

## 2022-08-10 RX ORDER — CYCLOBENZAPRINE HCL 5 MG
5 TABLET ORAL 3 TIMES DAILY PRN
Qty: 60 TABLET | Refills: 2 | Status: SHIPPED
Start: 2022-08-10 | End: 2022-10-10 | Stop reason: SDUPTHER

## 2022-08-10 NOTE — TELEPHONE ENCOUNTER
Last Appointment:  6/8/2022  Future Appointments   Date Time Provider Jeanette Nielsen   8/31/2022  3:00 PM Deniz Pope  [address] [place] [address]

## 2022-08-12 NOTE — RESULT ENCOUNTER NOTE
VM left for patient regarding MRI results. Instructed to call and schedule follow up appointment for review.

## 2022-08-30 ENCOUNTER — OFFICE VISIT (OUTPATIENT)
Dept: ORTHOPEDIC SURGERY | Age: 42
End: 2022-08-30
Payer: COMMERCIAL

## 2022-08-30 VITALS — HEIGHT: 63 IN | BODY MASS INDEX: 38.98 KG/M2 | WEIGHT: 220 LBS

## 2022-08-30 DIAGNOSIS — E55.9 VITAMIN D DEFICIENCY: ICD-10-CM

## 2022-08-30 DIAGNOSIS — M25.561 RIGHT KNEE PAIN, UNSPECIFIED CHRONICITY: Primary | ICD-10-CM

## 2022-08-30 DIAGNOSIS — E78.2 MIXED HYPERLIPIDEMIA: ICD-10-CM

## 2022-08-30 DIAGNOSIS — R73.01 IMPAIRED FASTING GLUCOSE: ICD-10-CM

## 2022-08-30 LAB
ANISOCYTOSIS: ABNORMAL
ATYPICAL LYMPHOCYTE RELATIVE PERCENT: 0.9 % (ref 0–4)
BASOPHILS ABSOLUTE: 0 E9/L (ref 0–0.2)
BASOPHILS RELATIVE PERCENT: 0.4 % (ref 0–2)
BILIRUBIN URINE: NEGATIVE
BLOOD, URINE: NEGATIVE
CLARITY: CLEAR
COLOR: YELLOW
EOSINOPHILS ABSOLUTE: 0.34 E9/L (ref 0.05–0.5)
EOSINOPHILS RELATIVE PERCENT: 1.8 % (ref 0–6)
GLUCOSE URINE: NEGATIVE MG/DL
HBA1C MFR BLD: 5.7 % (ref 4–5.6)
HCT VFR BLD CALC: 43.1 % (ref 34–48)
HEMOGLOBIN: 14.1 G/DL (ref 11.5–15.5)
KETONES, URINE: NEGATIVE MG/DL
LEUKOCYTE ESTERASE, URINE: NEGATIVE
LYMPHOCYTES ABSOLUTE: 5.92 E9/L (ref 1.5–4)
LYMPHOCYTES RELATIVE PERCENT: 29.8 % (ref 20–42)
MCH RBC QN AUTO: 33.3 PG (ref 26–35)
MCHC RBC AUTO-ENTMCNC: 32.7 % (ref 32–34.5)
MCV RBC AUTO: 101.9 FL (ref 80–99.9)
MONOCYTES ABSOLUTE: 0.57 E9/L (ref 0.1–0.95)
MONOCYTES RELATIVE PERCENT: 2.6 % (ref 2–12)
NEUTROPHILS ABSOLUTE: 12.42 E9/L (ref 1.8–7.3)
NEUTROPHILS RELATIVE PERCENT: 64.9 % (ref 43–80)
NITRITE, URINE: NEGATIVE
PDW BLD-RTO: 13.1 FL (ref 11.5–15)
PH UA: 6 (ref 5–9)
PLATELET # BLD: 405 E9/L (ref 130–450)
PMV BLD AUTO: 10.3 FL (ref 7–12)
POLYCHROMASIA: ABNORMAL
PROTEIN UA: NEGATIVE MG/DL
RBC # BLD: 4.23 E12/L (ref 3.5–5.5)
SPECIFIC GRAVITY UA: <=1.005 (ref 1–1.03)
UROBILINOGEN, URINE: 0.2 E.U./DL
WBC # BLD: 19.1 E9/L (ref 4.5–11.5)

## 2022-08-30 PROCEDURE — 20610 DRAIN/INJ JOINT/BURSA W/O US: CPT | Performed by: NURSE PRACTITIONER

## 2022-08-30 RX ORDER — LIDOCAINE HYDROCHLORIDE 10 MG/ML
4 INJECTION, SOLUTION INFILTRATION; PERINEURAL ONCE
Status: COMPLETED | OUTPATIENT
Start: 2022-08-30 | End: 2022-08-30

## 2022-08-30 RX ORDER — TRIAMCINOLONE ACETONIDE 40 MG/ML
40 INJECTION, SUSPENSION INTRA-ARTICULAR; INTRAMUSCULAR ONCE
Status: COMPLETED | OUTPATIENT
Start: 2022-08-30 | End: 2022-08-30

## 2022-08-30 RX ADMIN — LIDOCAINE HYDROCHLORIDE 4 ML: 10 INJECTION, SOLUTION INFILTRATION; PERINEURAL at 15:57

## 2022-08-30 RX ADMIN — TRIAMCINOLONE ACETONIDE 40 MG: 40 INJECTION, SUSPENSION INTRA-ARTICULAR; INTRAMUSCULAR at 15:57

## 2022-08-30 NOTE — PROGRESS NOTES
Follow Up Visit     Teresa Mooney returns today for follow up visit regarding Right Knee pain. Patient follows up today for MRI review of her right knee. She reports symptoms remain unchanged. REVIEW OF SYSTEMS:     Constitutional:  Negative for weight loss, fevers, chills, fatigue  Cardiovascular: Negative for chest pain, palpitations  Pulmonary: Negative for shortness of breath, labored breathing, cough  GI: negative for abdominal pain, nausea, vomitting   MSK: per HPI  Skin: negative for rash, open wounds    All other systems reviewed and are negative       Physical Exam:     Height: 5' 3\" (1.6 m), Weight: 220 lb (99.8 kg) (per pt)    General: Alert and oriented x3, no acute distress  Cardiovascular/pulmonary: No labored breathing, peripheral perfusion intact  Musculoskeletal:    Right knee exam range of motion 0-140, joint line nontender to palpation. No swelling, deformity, palpable effusion today. Patella stable tracks midline. Valgus and varus stress intact. Controlled Substances Monitoring:      Imaging:  No new imaging obtained today. Recent MRI reviewed with patient showing no obvious tearing to the collateral, or cruciate ligaments, medial lateral meniscus appear intact. There is degenerative changes with full-thickness cartilage loss to the patellofemoral joint. Small knee effusion and small Baker's cyst visualized. Procedure Note: Knee Cortisone injection     The right knee was identified as the injection site. The risk and benefits of a cortisone injection were explained and the patient consented to the injection. Under sterile conditions, the knee was injected with a mixture of 40 mg of Kenalog and 4 mL of 1% Lidocaine without complication. A sterile bandage was applied.     Administrations This Visit       lidocaine 1 % injection 4 mL       Admin Date  08/30/2022 Action  Given Dose  4 mL Route  Intra-artICUlar Administered By  Anup Alexis RN              triamcinolone acetonide VIA [facility]) injection 40 mg       Admin Date  08/30/2022 Action  Given Dose  40 mg Route  Intra-artICUlar Administered By  Ra Chew RN                        Assessment: Right knee patellofemoral osteoarthritis      Plan: Today we discussed her right knee. MRI findings reviewed with patient. Treatment options discussed today. She was offered a cortisone injection for symptom relief. Injection was agreeable performed today. She will follow-up in 3 months for reevaluation. Patient verbalized understanding.       RUTH Forrest-CNP  Orthopedic Surgery   08/30/22  4:20 PM

## 2022-08-31 ENCOUNTER — OFFICE VISIT (OUTPATIENT)
Dept: FAMILY MEDICINE CLINIC | Age: 42
End: 2022-08-31
Payer: COMMERCIAL

## 2022-08-31 VITALS
TEMPERATURE: 97.5 F | HEART RATE: 83 BPM | HEIGHT: 63 IN | OXYGEN SATURATION: 97 % | BODY MASS INDEX: 40.57 KG/M2 | SYSTOLIC BLOOD PRESSURE: 120 MMHG | DIASTOLIC BLOOD PRESSURE: 88 MMHG | WEIGHT: 229 LBS | RESPIRATION RATE: 20 BRPM

## 2022-08-31 DIAGNOSIS — J30.9 CHRONIC ALLERGIC RHINITIS: ICD-10-CM

## 2022-08-31 DIAGNOSIS — F33.1 MODERATE EPISODE OF RECURRENT MAJOR DEPRESSIVE DISORDER (HCC): ICD-10-CM

## 2022-08-31 DIAGNOSIS — F41.1 GENERALIZED ANXIETY DISORDER: ICD-10-CM

## 2022-08-31 DIAGNOSIS — E05.90 HYPERTHYROIDISM: ICD-10-CM

## 2022-08-31 DIAGNOSIS — K52.9 COLITIS: ICD-10-CM

## 2022-08-31 DIAGNOSIS — E78.2 MIXED HYPERLIPIDEMIA: ICD-10-CM

## 2022-08-31 DIAGNOSIS — M25.561 ACUTE PAIN OF RIGHT KNEE: Primary | ICD-10-CM

## 2022-08-31 DIAGNOSIS — K22.70 BARRETT'S ESOPHAGUS WITHOUT DYSPLASIA: ICD-10-CM

## 2022-08-31 DIAGNOSIS — R00.0 TACHYCARDIA: ICD-10-CM

## 2022-08-31 DIAGNOSIS — K21.9 GASTROESOPHAGEAL REFLUX DISEASE WITHOUT ESOPHAGITIS: ICD-10-CM

## 2022-08-31 DIAGNOSIS — K22.4 ESOPHAGEAL SPASM: ICD-10-CM

## 2022-08-31 DIAGNOSIS — R73.01 IMPAIRED FASTING GLUCOSE: ICD-10-CM

## 2022-08-31 LAB
ALBUMIN SERPL-MCNC: 4.4 G/DL (ref 3.5–5.2)
ALP BLD-CCNC: 77 U/L (ref 35–104)
ALT SERPL-CCNC: 21 U/L (ref 0–32)
ANION GAP SERPL CALCULATED.3IONS-SCNC: 18 MMOL/L (ref 7–16)
AST SERPL-CCNC: 36 U/L (ref 0–31)
BILIRUB SERPL-MCNC: 0.2 MG/DL (ref 0–1.2)
BUN BLDV-MCNC: 8 MG/DL (ref 6–20)
CALCIUM SERPL-MCNC: 9.2 MG/DL (ref 8.6–10.2)
CHLORIDE BLD-SCNC: 103 MMOL/L (ref 98–107)
CHOLESTEROL, FASTING: 234 MG/DL (ref 0–199)
CO2: 19 MMOL/L (ref 22–29)
CREAT SERPL-MCNC: 0.9 MG/DL (ref 0.5–1)
GFR AFRICAN AMERICAN: >60
GFR NON-AFRICAN AMERICAN: >60 ML/MIN/1.73
GLUCOSE BLD-MCNC: 67 MG/DL (ref 74–99)
HDLC SERPL-MCNC: 42 MG/DL
LDL CHOLESTEROL CALCULATED: 116 MG/DL (ref 0–99)
MAGNESIUM: 2.3 MG/DL (ref 1.6–2.6)
POTASSIUM SERPL-SCNC: 4.6 MMOL/L (ref 3.5–5)
SODIUM BLD-SCNC: 140 MMOL/L (ref 132–146)
T4 TOTAL: 10.2 MCG/DL (ref 4.5–11.7)
TOTAL PROTEIN: 7.1 G/DL (ref 6.4–8.3)
TRIGLYCERIDE, FASTING: 379 MG/DL (ref 0–149)
TSH SERPL DL<=0.05 MIU/L-ACNC: 2.43 UIU/ML (ref 0.27–4.2)
VITAMIN D 25-HYDROXY: 12 NG/ML (ref 30–100)
VLDLC SERPL CALC-MCNC: 76 MG/DL

## 2022-08-31 PROCEDURE — 99213 OFFICE O/P EST LOW 20 MIN: CPT | Performed by: INTERNAL MEDICINE

## 2022-08-31 PROCEDURE — G8427 DOCREV CUR MEDS BY ELIG CLIN: HCPCS | Performed by: INTERNAL MEDICINE

## 2022-08-31 PROCEDURE — 1036F TOBACCO NON-USER: CPT | Performed by: INTERNAL MEDICINE

## 2022-08-31 PROCEDURE — G8417 CALC BMI ABV UP PARAM F/U: HCPCS | Performed by: INTERNAL MEDICINE

## 2022-08-31 RX ORDER — FLUTICASONE PROPIONATE 50 MCG
2 SPRAY, SUSPENSION (ML) NASAL DAILY
Qty: 1 EACH | Refills: 5 | Status: SHIPPED | OUTPATIENT
Start: 2022-08-31

## 2022-08-31 RX ORDER — ALPRAZOLAM 1 MG/1
1 TABLET ORAL 2 TIMES DAILY
Qty: 60 TABLET | Refills: 2 | Status: SHIPPED | OUTPATIENT
Start: 2022-08-31 | End: 2022-11-29

## 2022-08-31 ASSESSMENT — ENCOUNTER SYMPTOMS
ABDOMINAL PAIN: 0
SHORTNESS OF BREATH: 0
SORE THROAT: 0
BLOOD IN STOOL: 0
COUGH: 0
DIARRHEA: 0
NAUSEA: 0
CHEST TIGHTNESS: 0
RHINORRHEA: 0
VOMITING: 0
EYE PAIN: 0
CONSTIPATION: 0

## 2022-08-31 ASSESSMENT — PATIENT HEALTH QUESTIONNAIRE - PHQ9
1. LITTLE INTEREST OR PLEASURE IN DOING THINGS: NOT AT ALL
2. FEELING DOWN, DEPRESSED OR HOPELESS: NOT AT ALL
SUM OF ALL RESPONSES TO PHQ9 QUESTIONS 1 & 2: 0
DEPRESSION UNABLE TO ASSESS: YES

## 2022-08-31 NOTE — PROGRESS NOTES
States having severe IBS with diarrhea, possible ulcerative colitis. Stopped Lialda. No abdo pain. No current bowel urgency, bleeding. States bloating and gas. No fever or chills. States follows with GI. States on Viberzi twice a day or once a day. Last colonoscopy (4/21) - normal colonoscopy, int hemorrhoids, path - colon mucosa w no signicant inflammation. continue Viberzi, Phenergan, perphemazine and amitriptyline. Last visit with GI per reviewed note (11/21) - no change f/u 1yr. Added hycosamine     - States allergy and sinus symptoms have improved and stable. On  Flonase. On Singulair States taking loratadine as needed. - States back pain is stable. States comes and goes. States gabapentin has helped. - States following with endocrinology for graves disease. States methimazole stopped. State on atenolol 50mg alternating with 25mg. Started on levothyroxine 50mcg daily. Last endo visit per reviewed note (3/22) Wolm Bristle' disease now hypothyroid increase levothyroxine to 75 mcg recheck labs 6 weeks thyroid nodules ultrasound shows stability      - impaired fasting glucose. Last A1c was 5.7 (8/2022)      - States insomnia has improved with xanax and duloxetine    - States had childhood asthma. States singular has helped. States loratadine and ProAir as needed. States no wheezing or SOB. - States has seen gyn and placed on OCP for irregular menstrual cycles, last visit (11/2020)     optho (11/21) -spots on the right side of the head Romycin dexamethasone in the eyes to the right eye feel conjunctivitis not herpetic given Polytrim 4 times a day for 7 days. Still with spots to right head. - States to see dermatology. States lesion to forehead. States to be see in [employer].     - labs from 8/30/2022 reviewed with patient 8/31/2022      Health Maintenance   - immunizations:   Influenza Vaccination  - declines  Pneumonia Vaccination  Zoster/Shingles Vaccine  Tetanus Vaccination - (2011) covid - declined     - Screenings:   Bone Density Scan - (2/8/2017)  Pelvic/Pap Exam - (2017), (2019) - gyn  Mammogram (10/21) - No mammographic evidence of malignancy. Colonoscopy - (8/2016) - polyp, hyperplastic, internal hemorrhoids, (1/2019) - tortuous colon, colon spasm, diverticulosis, No UC seen, (4/21) - normal colonoscopy, int hemorrhoids, path - colon mucosa w no signicant inflammation     EGD - (8/2016) - Sosa's esophagus, gastric polyps, (1/2019), Mild to mod chronic active gastritis - esophageal spasm, (4/21) - gastric polyps, otherwise normal, path fundic gland polyp    ROS:  Review of Systems   Constitutional:  Negative for appetite change, chills, fever and unexpected weight change. HENT:  Negative for congestion, rhinorrhea and sore throat. Eyes:  Negative for pain and visual disturbance. Respiratory:  Negative for cough, chest tightness and shortness of breath. Cardiovascular:  Negative for chest pain and palpitations. Gastrointestinal:  Negative for abdominal pain, blood in stool, constipation, diarrhea, nausea and vomiting. Genitourinary:  Negative for difficulty urinating, dysuria, frequency, pelvic pain, urgency and vaginal bleeding. Musculoskeletal:  Negative for arthralgias and myalgias. Skin:  Negative for rash. Neurological:  Negative for dizziness, syncope, weakness, light-headedness, numbness and headaches. Hematological:  Negative for adenopathy. Psychiatric/Behavioral:  Negative for suicidal ideas. The patient is not nervous/anxious. Current Outpatient Medications:     ALPRAZolam (XANAX) 1 MG tablet, Take 1 tablet by mouth 2 times daily for 90 days. , Disp: 60 tablet, Rfl: 2    fluticasone (FLONASE) 50 MCG/ACT nasal spray, 2 sprays by Nasal route daily, Disp: 1 each, Rfl: 5    cyclobenzaprine (FLEXERIL) 5 MG tablet, Take 1 tablet by mouth 3 times daily as needed for Muscle spasms, Disp: 60 tablet, Rfl: 2    promethazine (PHENERGAN) 25 MG tablet, Take 1 tablet by mouth every 8 hours as needed for Nausea 1-2 tabs q 4 hrs prn, Disp: 40 tablet, Rfl: 2    ketoconazole (NIZORAL) 2 % shampoo, apply to affected area as directed USE TWO TO THREE TIMES WEEKLY, Disp: , Rfl:     hyoscyamine (LEVBID) 375 MCG extended release tablet, take 1 tablet by mouth every 12 hours, Disp: , Rfl:     gabapentin (NEURONTIN) 600 MG tablet, 1 tab 2-3 times qd, Disp: 75 tablet, Rfl: 2    DULoxetine (CYMBALTA) 60 MG extended release capsule, Take 1 capsule by mouth daily, Disp: 30 capsule, Rfl: 2    montelukast (SINGULAIR) 10 MG tablet, Take 1 tablet by mouth nightly, Disp: 30 tablet, Rfl: 2    valACYclovir (VALTREX) 1 g tablet, Take 1 tablet by mouth daily, Disp: 30 tablet, Rfl: 2    atenolol (TENORMIN) 50 MG tablet, Take 1 tablet by mouth 2 times daily, Disp: 60 tablet, Rfl: 2    omeprazole (PRILOSEC) 40 MG delayed release capsule, Take 1 capsule by mouth daily, Disp: 30 capsule, Rfl: 5    loratadine (CLARITIN) 10 MG tablet, Take 1 tablet by mouth daily as needed (allergies), Disp: 30 tablet, Rfl: 5    Levothyroxine Sodium 75 MCG CAPS, Take 75 mcg by mouth Daily , Disp: , Rfl:     albuterol sulfate  (90 Base) MCG/ACT inhaler, Inhale 2 puffs into the lungs every 4 hours as needed for Wheezing, Disp: 1 Inhaler, Rfl: 3    acetaminophen (TYLENOL) 500 MG tablet, Take 500 mg by mouth every 6 hours as needed for Pain, Disp: , Rfl:     amitriptyline (ELAVIL) 10 MG tablet, Take 10 mg by mouth nightly , Disp: , Rfl:     perphenazine 2 MG tablet, Take 2 mg by mouth daily , Disp: , Rfl:     CAMRESE LO 0.1-0.02 & 0.01 MG TABS, , Disp: , Rfl: 0    VIBERZI 100 MG TABS, 1 tablet 2 times daily. , Disp: , Rfl: 0    Allergies   Allergen Reactions    Latex     Ibuprofen Other (See Comments)     NSAIDs trigger colitis        Past Medical History:   Diagnosis Date    Asthma     Sosa's esophagus without dysplasia 7/23/2019    Chronic allergic rhinitis 7/23/2019    Colitis 7/23/2019    Depression Esophageal spasm 2019    Gastroesophageal reflux disease without esophagitis 2019    Generalized anxiety disorder 2019    Hyperthyroidism 2019    Impaired fasting glucose 2019    Insomnia     Irritable bowel syndrome     Seasonal allergies     [facility] ([facility]        Past Surgical History:   Procedure Laterality Date    NASAL SEPTUM SURGERY      biopsy     WISDOM TOOTH EXTRACTION         Family History   Problem Relation Age of Onset    Osteoarthritis Mother     Elevated Lipids Mother     Heart Disease Father     Other Father         back pain     Elevated Lipids Father     Colon Cancer Father     Colon Cancer Maternal Grandmother     Breast Cancer Maternal Aunt     Breast Cancer Paternal Aunt        Social History     Socioeconomic History    Marital status:      Spouse name: Not on file    Number of children: Not on file    Years of education: Not on file    Highest education level: Not on file   Occupational History    Not on file   Tobacco Use    Smoking status: Former     Packs/day: 0.50     Years: 1.50     Pack years: 0.75     Types: Cigarettes     Quit date: 1999     Years since quittin.1    Smokeless tobacco: Never   Substance and Sexual Activity    Alcohol use: Yes     Comment: socially     Drug use: Not on file    Sexual activity: Not on file   Other Topics Concern    Not on file   Social History Narrative    Not on file     Social Determinants of Health     Financial Resource Strain: Low Risk     Difficulty of Paying Living Expenses: Not hard at all   Food Insecurity: No Food Insecurity    Worried About Running Out of Food in the Last Year: Never true    Ran Out of Food in the Last Year: Never true   Transportation Needs: Not on file   Physical Activity: Not on file   Stress: Not on file   Social Connections: Not on file   Intimate Partner Violence: Not on file   Housing Stability: Not on file       Vitals:    22 1500   BP: 120/88   Site: Left Upper Arm   Position: Sitting   Cuff Size: Large Adult   Pulse: 83   Resp: 20   Temp: 97.5 °F (36.4 °C)   TempSrc: Temporal   SpO2: 97%   Weight: 229 lb (103.9 kg)   Height: 5' 3\" (1.6 m)       Exam:  Physical Exam  Constitutional:       Appearance: She is well-developed. HENT:      Head: Normocephalic and atraumatic. Right Ear: External ear normal.      Left Ear: External ear normal.   Eyes:      Pupils: Pupils are equal, round, and reactive to light. Neck:      Thyroid: No thyromegaly. Cardiovascular:      Rate and Rhythm: Normal rate and regular rhythm. Heart sounds: Normal heart sounds. No murmur heard. Pulmonary:      Effort: Pulmonary effort is normal.      Breath sounds: Normal breath sounds. No wheezing. Abdominal:      General: Bowel sounds are normal. There is no distension. Palpations: Abdomen is soft. Tenderness: There is no abdominal tenderness. There is no guarding or rebound. Musculoskeletal:         General: Normal range of motion. Cervical back: Normal range of motion and neck supple. Lymphadenopathy:      Cervical: No cervical adenopathy. Skin:     General: Skin is warm and dry. Neurological:      Mental Status: She is alert and oriented to person, place, and time. Cranial Nerves: No cranial nerve deficit.    Psychiatric:         Judgment: Judgment normal.       Assessment and Plan:    Diagnoses and all orders for this visit:    Acute pain of right knee  - poss strain and sprain   - alternate heat and ice   - compression - knee sleeve   - home exercises  - s/p MRI (7/2022)   - s/p injection to right knee     Tachycardia  - uncertain etiology   - has hx of graves and would recheck labs   - referral to cardiology  - declined   - increased atenolol to 50mg twice a day   - improved after medication change   - stable today 8/31/2022    Moderate episode of recurrent major depressive disorder ([facility]  - off paxil  - on duloxetine given helps also with joint pain at 60mg  - stable     Generalized anxiety disorder  - off paxil  - on duloxetine given helps also with joint pain at 60mg  - on xanax. Now increased to twice a day, asking for furthe rincrease   - suggest counseling - declines  - uncontrolled   - recommend referral - placed     OARRS report reviewed (8/312022)  controlled substance agreement updated (2/25/20)    Patient requests benzodiazepine medication refill. I have reviewed the current medications and prior notes regarding the need for these refills. I believe the need for refill is warranted at this time. I have reviewed the OARRS report and found no suspicion of drug seeking behavior. I have discussed the side effects and narcotic/benzodiazepine policy with this patient and the patient has demonstrated understanding. A follow up appointment will be scheduled with me    Controlled Substance Monitoring:    Acute and Chronic Pain Monitoring:   RX Monitoring 8/31/2022   Periodic Controlled Substance Monitoring Possible medication side effects, risk of tolerance/dependence & alternative treatments discussed. ;No signs of potential drug abuse or diversion identified.        Gayle's esophagus without dysplasia  - last EGD 4/2021- no gayle seen, gastric polyps  - on omeprazole  - following with GI  - stable     Gastroesophageal reflux disease without esophagitis  - watch diet  - on omeprazole   - discussed long term use side effects  - recommended haldol by GI - did not tolerate side effects   - on perphen-amitriptyline for spasm   - last EGD 4/2021- no gayle seen, gastric polyps  - stable     Colitis - poss Ulcerative colitis with complication, unspecified location [facility])  - following up with GI  - uncertain etiology   - stopped lialda  - last colonoscopy (4/21) - normal colonoscopy, int hemorrhoids, path - colon mucosa w no signicant inflammation    Impaired fasting glucose  hyperglycemia  - Follow A1c - last 2/2022 - 5.4    Mixed hyperlipidemia  - watch diet  -

## 2022-09-01 ENCOUNTER — TELEPHONE (OUTPATIENT)
Dept: FAMILY MEDICINE CLINIC | Age: 42
End: 2022-09-01

## 2022-09-01 NOTE — LETTER
[address] [place] [address] Drive  Montez Galo  Phone: [phone]  Fax: [phone]    Roopa Monreal MD        September 9, 2022     [place] [id number]      Dear Annette Cedillo:    Below are the results from your recent visit:    Resulted Orders   CBC with Auto Differential   Result Value Ref Range    WBC 19.1 (H) 4.5 - 11.5 E9/L    RBC 4.23 3.50 - 5.50 E12/L    Hemoglobin 14.1 11.5 - 15.5 g/dL    Hematocrit 43.1 34.0 - 48.0 %    .9 (H) 80.0 - 99.9 fL    MCH 33.3 26.0 - 35.0 pg    MCHC 32.7 32.0 - 34.5 %    RDW 13.1 11.5 - 15.0 fL    Platelets 140 571 - 736 E9/L    MPV 10.3 7.0 - 12.0 fL    Neutrophils % 64.9 43.0 - 80.0 %    Lymphocytes % 29.8 20.0 - 42.0 %    Monocytes % 2.6 2.0 - 12.0 %    Eosinophils % 1.8 0.0 - 6.0 %    Basophils % 0.4 0.0 - 2.0 %    Neutrophils Absolute 12.42 (H) 1.80 - 7.30 E9/L    Lymphocytes Absolute 5.92 (H) 1.50 - 4.00 E9/L    Monocytes Absolute 0.57 0.10 - 0.95 E9/L    Eosinophils Absolute 0.34 0.05 - 0.50 E9/L    Basophils Absolute 0.00 0.00 - 0.20 E9/L    Atypical Lymphocytes Relative 0.9 0.0 - 4.0 %    Anisocytosis 1+     Polychromasia 1+     Narrative    CALL  doctor   tel. [phone], fax too [phone]   Comprehensive Metabolic Panel   Result Value Ref Range    Sodium 140 132 - 146 mmol/L    Potassium 4.6 3.5 - 5.0 mmol/L    Chloride 103 98 - 107 mmol/L    CO2 19 (L) 22 - 29 mmol/L    Anion Gap 18 (H) 7 - 16 mmol/L    Glucose 67 (L) 74 - 99 mg/dL    BUN 8 6 - 20 mg/dL    Creatinine 0.9 0.5 - 1.0 mg/dL    GFR Non-African American >60 >=60 mL/min/1.73      Comment:      Chronic Kidney Disease: less than 60 ml/min/1.73 sq.m. Kidney Failure: less than 15 ml/min/1.73 sq.m. Results valid for patients 18 years and older.       GFR African American >60     Calcium 9.2 8.6 - 10.2 mg/dL    Total Protein 7.1 6.4 - 8.3 g/dL    Albumin 4.4 3.5 - 5.2 g/dL    Total Bilirubin 0.2 0.0 - 1.2 mg/dL recommendations:     Sugar level on particular blood work was low. Crystal Hockey A1c is a measure of 3-month sugar control was borderline elevated at 5.7.  This would be considered prediabetes     Bicarbonate level was borderline decreased and similar when compared to previous     1 liver function was borderline elevated of uncertain cause     Other electrolytes, other liver functions, and kidney function values were normal     Blood counts as discussed showed elevated white blood cell count which is possibly related to recent steroid injection for the knee     Other blood counts were normal     Cholesterol levels were still elevated.  Triglyceride levels were still elevated. The 10-year ASCVD risk score (Tyler Councilman., et al., 2013) is: 1.4%, which is considered lower risk for heart and vascular complications in the next 10 years.  Recommending to watch diet, exercise, weight control     Thyroid levels including TSH and free T4 were normal limits     Urine analysis was normal     Vitamin D level was still low and would recommend vitamin D supplement      If you have any questions or concerns, please don't hesitate to call.     Sincerely,        Abbie Polo MD

## 2022-09-09 NOTE — TELEPHONE ENCOUNTER
Left a VM letting patient know that I will send results through [address] and mail a copy to the home.

## 2022-09-12 RX ORDER — CHOLECALCIFEROL (VITAMIN D3) 125 MCG
1 CAPSULE ORAL DAILY
Qty: 90 TABLET | Refills: 3 | Status: SHIPPED | OUTPATIENT
Start: 2022-09-12

## 2022-09-12 NOTE — TELEPHONE ENCOUNTER
Requested Prescriptions     Signed Prescriptions Disp Refills    Cholecalciferol (VITAMIN D3) 50 MCG (2000 UT) TABS 90 tablet 3     Sig: Take 1 tablet by mouth daily     Authorizing Provider: Pa Gage

## 2022-09-12 NOTE — TELEPHONE ENCOUNTER
Neri Cabral! I got your message regarding lab results. I also had seen my endocrinologist last week and he went over them with me as well. If you can find out what dose of vitamin D  recommends and phone in TABLET form please I'd really appreciate it. Have a great day!! Thanks :)  Bailey Knox       Did you want Bernarda to take 2000 IU of Vitamin D? Pt would like an Rx sent for tablets to her pharmacy.

## 2022-09-13 DIAGNOSIS — K22.4 ESOPHAGEAL SPASM: ICD-10-CM

## 2022-09-13 DIAGNOSIS — F33.1 MODERATE EPISODE OF RECURRENT MAJOR DEPRESSIVE DISORDER (HCC): ICD-10-CM

## 2022-09-13 DIAGNOSIS — K22.70 BARRETT'S ESOPHAGUS WITHOUT DYSPLASIA: ICD-10-CM

## 2022-09-13 DIAGNOSIS — M15.9 PRIMARY OSTEOARTHRITIS INVOLVING MULTIPLE JOINTS: ICD-10-CM

## 2022-09-13 DIAGNOSIS — K21.9 GASTROESOPHAGEAL REFLUX DISEASE WITHOUT ESOPHAGITIS: ICD-10-CM

## 2022-09-13 DIAGNOSIS — F41.1 GENERALIZED ANXIETY DISORDER: ICD-10-CM

## 2022-09-13 DIAGNOSIS — J30.9 CHRONIC ALLERGIC RHINITIS: ICD-10-CM

## 2022-09-13 DIAGNOSIS — B02.29 POST HERPETIC NEURALGIA: ICD-10-CM

## 2022-09-13 RX ORDER — DULOXETIN HYDROCHLORIDE 60 MG/1
60 CAPSULE, DELAYED RELEASE ORAL DAILY
Qty: 30 CAPSULE | Refills: 2 | Status: SHIPPED | OUTPATIENT
Start: 2022-09-13

## 2022-09-13 RX ORDER — MONTELUKAST SODIUM 10 MG/1
10 TABLET ORAL NIGHTLY
Qty: 30 TABLET | Refills: 2 | Status: SHIPPED | OUTPATIENT
Start: 2022-09-13

## 2022-09-13 RX ORDER — VALACYCLOVIR HYDROCHLORIDE 1 G/1
1000 TABLET, FILM COATED ORAL DAILY
Qty: 30 TABLET | Refills: 2 | Status: SHIPPED | OUTPATIENT
Start: 2022-09-13

## 2022-09-13 RX ORDER — OMEPRAZOLE 40 MG/1
40 CAPSULE, DELAYED RELEASE ORAL DAILY
Qty: 30 CAPSULE | Refills: 2 | Status: SHIPPED | OUTPATIENT
Start: 2022-09-13

## 2022-09-13 NOTE — TELEPHONE ENCOUNTER
Last Appointment:  8/31/2022  Future Appointments   Date Time Provider Jeanette Skaggsi   11/22/2022  3:45 PM [facility]   12/1/2022  3:00 PM Deniz Pope  [address] [place] [address]

## 2022-09-25 DIAGNOSIS — B02.29 POST HERPETIC NEURALGIA: ICD-10-CM

## 2022-09-27 RX ORDER — GABAPENTIN 600 MG/1
TABLET ORAL
Qty: 75 TABLET | Refills: 2 | Status: SHIPPED | OUTPATIENT
Start: 2022-09-27 | End: 2023-01-02

## 2022-09-27 NOTE — TELEPHONE ENCOUNTER
Last Appointment:  8/31/2022  Future Appointments   Date Time Provider Jeanette Skaggsi   11/22/2022  3:45 PM RUTH Law - CNP [facility]   12/1/2022  3:00 PM Malika Pérez  [address] [place] [address]

## 2022-10-10 DIAGNOSIS — K22.70 BARRETT'S ESOPHAGUS WITHOUT DYSPLASIA: ICD-10-CM

## 2022-10-10 DIAGNOSIS — K21.9 GASTROESOPHAGEAL REFLUX DISEASE WITHOUT ESOPHAGITIS: ICD-10-CM

## 2022-10-10 RX ORDER — CYCLOBENZAPRINE HCL 5 MG
5 TABLET ORAL 3 TIMES DAILY PRN
Qty: 60 TABLET | Refills: 2 | Status: SHIPPED | OUTPATIENT
Start: 2022-10-10

## 2022-10-10 RX ORDER — PROMETHAZINE HYDROCHLORIDE 25 MG/1
25 TABLET ORAL EVERY 8 HOURS PRN
Qty: 40 TABLET | Refills: 2 | Status: SHIPPED | OUTPATIENT
Start: 2022-10-10

## 2022-12-01 ENCOUNTER — PATIENT MESSAGE (OUTPATIENT)
Dept: FAMILY MEDICINE CLINIC | Age: 42
End: 2022-12-01

## 2022-12-01 ENCOUNTER — TELEPHONE (OUTPATIENT)
Dept: ORTHOPEDIC SURGERY | Age: 42
End: 2022-12-01

## 2022-12-01 DIAGNOSIS — F33.1 MODERATE EPISODE OF RECURRENT MAJOR DEPRESSIVE DISORDER (HCC): ICD-10-CM

## 2022-12-01 DIAGNOSIS — F41.1 GENERALIZED ANXIETY DISORDER: ICD-10-CM

## 2022-12-01 NOTE — TELEPHONE ENCOUNTER
Pt phoned in wanting to reschedule appt from 12/1. Pt requested afternoon on 12/15; rescheduled and left appt info. Advised them to call back if they had any questions.

## 2022-12-02 RX ORDER — ALPRAZOLAM 1 MG/1
1 TABLET ORAL 2 TIMES DAILY
Qty: 60 TABLET | Refills: 2 | Status: SHIPPED | OUTPATIENT
Start: 2022-12-02 | End: 2023-03-02

## 2022-12-02 NOTE — TELEPHONE ENCOUNTER
Patient called to follow up on request.Apologized for delay, office did not forward request to Dr Gabi Jiménez.

## 2022-12-07 ENCOUNTER — OFFICE VISIT (OUTPATIENT)
Dept: FAMILY MEDICINE CLINIC | Age: 42
End: 2022-12-07
Payer: COMMERCIAL

## 2022-12-07 ENCOUNTER — TELEPHONE (OUTPATIENT)
Dept: FAMILY MEDICINE CLINIC | Age: 42
End: 2022-12-07

## 2022-12-07 VITALS
DIASTOLIC BLOOD PRESSURE: 80 MMHG | SYSTOLIC BLOOD PRESSURE: 130 MMHG | BODY MASS INDEX: 41.64 KG/M2 | TEMPERATURE: 98.2 F | HEIGHT: 63 IN | HEART RATE: 106 BPM | OXYGEN SATURATION: 98 % | RESPIRATION RATE: 18 BRPM | WEIGHT: 235 LBS

## 2022-12-07 DIAGNOSIS — R73.01 IMPAIRED FASTING GLUCOSE: ICD-10-CM

## 2022-12-07 DIAGNOSIS — K52.9 COLITIS: ICD-10-CM

## 2022-12-07 DIAGNOSIS — E05.90 HYPERTHYROIDISM: ICD-10-CM

## 2022-12-07 DIAGNOSIS — M15.9 PRIMARY OSTEOARTHRITIS INVOLVING MULTIPLE JOINTS: ICD-10-CM

## 2022-12-07 DIAGNOSIS — F41.1 GENERALIZED ANXIETY DISORDER: ICD-10-CM

## 2022-12-07 DIAGNOSIS — E55.9 VITAMIN D DEFICIENCY: ICD-10-CM

## 2022-12-07 DIAGNOSIS — K22.70 BARRETT'S ESOPHAGUS WITHOUT DYSPLASIA: ICD-10-CM

## 2022-12-07 DIAGNOSIS — E78.2 MIXED HYPERLIPIDEMIA: ICD-10-CM

## 2022-12-07 DIAGNOSIS — R00.0 TACHYCARDIA: Primary | ICD-10-CM

## 2022-12-07 DIAGNOSIS — F33.1 MODERATE EPISODE OF RECURRENT MAJOR DEPRESSIVE DISORDER (HCC): ICD-10-CM

## 2022-12-07 DIAGNOSIS — B02.29 POST HERPETIC NEURALGIA: ICD-10-CM

## 2022-12-07 DIAGNOSIS — N92.6 IRREGULAR MENSTRUAL CYCLE: ICD-10-CM

## 2022-12-07 DIAGNOSIS — J30.9 CHRONIC ALLERGIC RHINITIS: ICD-10-CM

## 2022-12-07 DIAGNOSIS — K21.9 GASTROESOPHAGEAL REFLUX DISEASE WITHOUT ESOPHAGITIS: ICD-10-CM

## 2022-12-07 DIAGNOSIS — K22.4 ESOPHAGEAL SPASM: ICD-10-CM

## 2022-12-07 LAB
BASOPHILS ABSOLUTE: 0.06 E9/L (ref 0–0.2)
BASOPHILS RELATIVE PERCENT: 0.5 % (ref 0–2)
EOSINOPHILS ABSOLUTE: 0.28 E9/L (ref 0.05–0.5)
EOSINOPHILS RELATIVE PERCENT: 2.1 % (ref 0–6)
HCT VFR BLD CALC: 41.7 % (ref 34–48)
HEMOGLOBIN: 13.5 G/DL (ref 11.5–15.5)
IMMATURE GRANULOCYTES #: 0.06 E9/L
IMMATURE GRANULOCYTES %: 0.5 % (ref 0–5)
LYMPHOCYTES ABSOLUTE: 3.83 E9/L (ref 1.5–4)
LYMPHOCYTES RELATIVE PERCENT: 28.8 % (ref 20–42)
MCH RBC QN AUTO: 34.2 PG (ref 26–35)
MCHC RBC AUTO-ENTMCNC: 32.4 % (ref 32–34.5)
MCV RBC AUTO: 105.6 FL (ref 80–99.9)
MONOCYTES ABSOLUTE: 0.77 E9/L (ref 0.1–0.95)
MONOCYTES RELATIVE PERCENT: 5.8 % (ref 2–12)
NEUTROPHILS ABSOLUTE: 8.29 E9/L (ref 1.8–7.3)
NEUTROPHILS RELATIVE PERCENT: 62.3 % (ref 43–80)
PDW BLD-RTO: 12.6 FL (ref 11.5–15)
PLATELET # BLD: 431 E9/L (ref 130–450)
PMV BLD AUTO: 10.4 FL (ref 7–12)
RBC # BLD: 3.95 E12/L (ref 3.5–5.5)
WBC # BLD: 13.3 E9/L (ref 4.5–11.5)

## 2022-12-07 PROCEDURE — G8427 DOCREV CUR MEDS BY ELIG CLIN: HCPCS | Performed by: INTERNAL MEDICINE

## 2022-12-07 PROCEDURE — G8417 CALC BMI ABV UP PARAM F/U: HCPCS | Performed by: INTERNAL MEDICINE

## 2022-12-07 PROCEDURE — 99214 OFFICE O/P EST MOD 30 MIN: CPT | Performed by: INTERNAL MEDICINE

## 2022-12-07 PROCEDURE — G8484 FLU IMMUNIZE NO ADMIN: HCPCS | Performed by: INTERNAL MEDICINE

## 2022-12-07 PROCEDURE — 1036F TOBACCO NON-USER: CPT | Performed by: INTERNAL MEDICINE

## 2022-12-07 RX ORDER — PROMETHAZINE HYDROCHLORIDE 25 MG/1
25 TABLET ORAL EVERY 8 HOURS PRN
Qty: 40 TABLET | Refills: 2 | Status: SHIPPED
Start: 2022-12-07 | End: 2022-12-08 | Stop reason: SDUPTHER

## 2022-12-07 RX ORDER — CYCLOBENZAPRINE HCL 5 MG
5 TABLET ORAL 3 TIMES DAILY PRN
Qty: 60 TABLET | Refills: 2 | Status: SHIPPED | OUTPATIENT
Start: 2022-12-07

## 2022-12-07 RX ORDER — OMEPRAZOLE 40 MG/1
40 CAPSULE, DELAYED RELEASE ORAL DAILY
Qty: 30 CAPSULE | Refills: 2 | Status: SHIPPED | OUTPATIENT
Start: 2022-12-07

## 2022-12-07 RX ORDER — VALACYCLOVIR HYDROCHLORIDE 1 G/1
1000 TABLET, FILM COATED ORAL DAILY
Qty: 30 TABLET | Refills: 2 | Status: SHIPPED | OUTPATIENT
Start: 2022-12-07

## 2022-12-07 RX ORDER — DULOXETIN HYDROCHLORIDE 60 MG/1
60 CAPSULE, DELAYED RELEASE ORAL DAILY
Qty: 30 CAPSULE | Refills: 2 | Status: SHIPPED | OUTPATIENT
Start: 2022-12-07

## 2022-12-07 RX ORDER — LEVONORGESTREL AND ETHINYL ESTRADIOL 100-20(84)
KIT ORAL
Qty: 1 PACKET | Refills: 0 | Status: SHIPPED | OUTPATIENT
Start: 2022-12-07

## 2022-12-07 RX ORDER — ATENOLOL 50 MG/1
50 TABLET ORAL 2 TIMES DAILY
Qty: 60 TABLET | Refills: 2 | Status: SHIPPED | OUTPATIENT
Start: 2022-12-07

## 2022-12-07 RX ORDER — MONTELUKAST SODIUM 10 MG/1
10 TABLET ORAL NIGHTLY
Qty: 30 TABLET | Refills: 2 | Status: SHIPPED | OUTPATIENT
Start: 2022-12-07

## 2022-12-07 RX ORDER — GABAPENTIN 600 MG/1
TABLET ORAL
Qty: 75 TABLET | Refills: 2 | Status: SHIPPED | OUTPATIENT
Start: 2022-12-07 | End: 2023-03-14

## 2022-12-07 SDOH — ECONOMIC STABILITY: FOOD INSECURITY: WITHIN THE PAST 12 MONTHS, YOU WORRIED THAT YOUR FOOD WOULD RUN OUT BEFORE YOU GOT MONEY TO BUY MORE.: PATIENT DECLINED

## 2022-12-07 SDOH — ECONOMIC STABILITY: FOOD INSECURITY: WITHIN THE PAST 12 MONTHS, THE FOOD YOU BOUGHT JUST DIDN'T LAST AND YOU DIDN'T HAVE MONEY TO GET MORE.: PATIENT DECLINED

## 2022-12-07 ASSESSMENT — ENCOUNTER SYMPTOMS
SORE THROAT: 0
BLOOD IN STOOL: 0
ABDOMINAL PAIN: 0
CONSTIPATION: 0
DIARRHEA: 0
RHINORRHEA: 0
SHORTNESS OF BREATH: 0
NAUSEA: 0
COUGH: 0
CHEST TIGHTNESS: 0
VOMITING: 0
EYE PAIN: 0

## 2022-12-07 ASSESSMENT — PATIENT HEALTH QUESTIONNAIRE - PHQ9
2. FEELING DOWN, DEPRESSED OR HOPELESS: SEVERAL DAYS
1. LITTLE INTEREST OR PLEASURE IN DOING THINGS: NOT AT ALL
SUM OF ALL RESPONSES TO PHQ9 QUESTIONS 1 & 2: 1

## 2022-12-07 ASSESSMENT — SOCIAL DETERMINANTS OF HEALTH (SDOH): HOW HARD IS IT FOR YOU TO PAY FOR THE VERY BASICS LIKE FOOD, HOUSING, MEDICAL CARE, AND HEATING?: PATIENT DECLINED

## 2022-12-07 NOTE — PROGRESS NOTES
[facility]) Physicians   Internal Medicine     2022  Carlos A Graham : 1980 Sex: female  Age:42 y.o. Chief Complaint   Patient presents with    Herpes Zoster     Having a flare-up    Gastroesophageal Reflux     3 month f/u    Allergies     3 month f/u    Cholesterol Problem     3 month f/u    Depression     3 month f/u    Thyroid Problem     3 month f/u    Other     Would like her blood count checked        HPI:   Patient presents to office for evaluation of the following medical concerns. -  has lost job 1 month ago. -  has been having shingles flare.  has been increasing vlacyclovir to 3x per day for the last 3 days. - Was seen in ER () -  Shortness of breath, wheezing, and COVID-19+ today, cxr no acute, prescribed Paxlovid. States did not complete paxlovid. No residula issues. - states having knee pain. States has been gardening. States having pain behind right knee. States worse with stairs both walking up and down. Occasionally feels as if going to give out.  has seen ortho. Had MRI of the knee () - small effusion, mod patellofemoral cartilage degeneration. Last visit (2022) per reviewed note cortisone injection to right knee, f/u 3 months. Improved post injection.     - States had increased heart rate and blood pressure. States has been having diaphoresis. On atenolol 50mg twice a day. - States anxiety is stable. On duloxetine and xanax. States increased taking xanax twice a day. States also helps with sleeping. Did not go to counseling. No Suicidal thoughts. No reported side effects with medications. States meds are helping symptoms. Last PHQ score of 6 (2022)     - States shingles, recurrent. Having current exacerbation (2022) - increased valtrx to tid, typically daily. Infectious disease recommended valtrex daily for suppressive treatment. States taking gabapentin 600mg twice a day for post herpetic neuralgia.  Not currently following with ID. Stable. - Has impaired fasting glucose. Following with endocrinology. States A1c was good at 5.4 (2/2022)     - States GERD, states on omeprazole - had EGD and colonoscopy - Sosa esophagus on (8/2016), follow up 1/2019 - no Sosa - states esophageal spasm and gastritis. Now on perphen-amitriptyline. On phenergan as needed. EGD (4/21) - gastric polyps, otherwise normal, path fundic gland polyp    - States having severe IBS with diarrhea, possible ulcerative colitis. Stopped Lialda. No abdo pain. No current bowel urgency, bleeding. States bloating and gas. No fever or chills. States follows with GI. States on Viberzi twice a day or once a day. Last colonoscopy (4/21) - normal colonoscopy, int hemorrhoids, path - colon mucosa w no signicant inflammation. continue Viberzi, Phenergan, perphemazine and amitriptyline. Last visit with GI per reviewed note (11/21) - no change f/u 1yr. Added hycosamine. States was seen in (11/2022) - having flare and was given mesalamine supp. Improved     - States allergy and sinus symptoms have improved and stable. On  Flonase. On Singulair States taking loratadine as needed. - States back pain is stable. States comes and goes. States gabapentin has helped. - States following with endocrinology for graves disease. States methimazole stopped. State on atenolol 50mg alternating with 25mg. Started on levothyroxine 50mcg daily. Last endo visit per reviewed note  (9/22) -hypothyroidism, hyperlipidemia with hypertriglyceridemia, impaired fasting glucose, thyroid nodule. Continue present dose of levothyroxine no further thyroid ultrasound needed     - impaired fasting glucose. Last A1c was 5.7 (8/2022)      - States insomnia has improved with xanax and duloxetine    - States had childhood asthma. States singular has helped. States loratadine and ProAir as needed. States no wheezing or SOB.     - States has seen gyn and placed on OCP for irregular menstrual cycles, last visit (11/2020)     optho (11/21) -spots on the right side of the head Romycin dexamethasone in the eyes to the right eye feel conjunctivitis not herpetic given Polytrim 4 times a day for 7 days. Still with spots to right head. - States to see dermatology. States lesion to forehead. States to be see in [employer]. - labs from 8/30/2022 reviewed with patient 12/7/2022      Health Maintenance   - immunizations:   Influenza Vaccination  - declines  Pneumonia Vaccination  Zoster/Shingles Vaccine  Tetanus Vaccination - (2011)   covid - declined     - Screenings:   Bone Density Scan - (2/8/2017)  Pelvic/Pap Exam - (2017), (2019) - gyn  Mammogram (10/21) - No mammographic evidence of malignancy. Colonoscopy - (8/2016) - polyp, hyperplastic, internal hemorrhoids, (1/2019) - tortuous colon, colon spasm, diverticulosis, No UC seen, (4/21) - normal colonoscopy, int hemorrhoids, path - colon mucosa w no signicant inflammation     EGD - (8/2016) - Sosa's esophagus, gastric polyps, (1/2019), Mild to mod chronic active gastritis - esophageal spasm, (4/21) - gastric polyps, otherwise normal, path fundic gland polyp    ROS:  Review of Systems   Constitutional:  Negative for appetite change, chills, fever and unexpected weight change. HENT:  Negative for congestion, rhinorrhea and sore throat. Eyes:  Negative for pain and visual disturbance. Respiratory:  Negative for cough, chest tightness and shortness of breath. Cardiovascular:  Negative for chest pain and palpitations. Gastrointestinal:  Negative for abdominal pain, blood in stool, constipation, diarrhea, nausea and vomiting. Genitourinary:  Negative for difficulty urinating, dysuria, frequency, pelvic pain, urgency and vaginal bleeding. Musculoskeletal:  Negative for arthralgias and myalgias. Skin:  Negative for rash. Neurological:  Negative for dizziness, syncope, weakness, light-headedness, numbness and headaches. Hematological:  Negative for adenopathy. Psychiatric/Behavioral:  Negative for suicidal ideas. The patient is not nervous/anxious. Current Outpatient Medications:     atenolol (TENORMIN) 50 MG tablet, Take 1 tablet by mouth in the morning and 1 tablet in the evening., Disp: 60 tablet, Rfl: 2    cyclobenzaprine (FLEXERIL) 5 MG tablet, Take 1 tablet by mouth 3 times daily as needed for Muscle spasms, Disp: 60 tablet, Rfl: 2    DULoxetine (CYMBALTA) 60 MG extended release capsule, Take 1 capsule by mouth daily, Disp: 30 capsule, Rfl: 2    gabapentin (NEURONTIN) 600 MG tablet, 1 tab 2-3 times qd, Disp: 75 tablet, Rfl: 2    montelukast (SINGULAIR) 10 MG tablet, Take 1 tablet by mouth nightly, Disp: 30 tablet, Rfl: 2    omeprazole (PRILOSEC) 40 MG delayed release capsule, Take 1 capsule by mouth daily, Disp: 30 capsule, Rfl: 2    promethazine (PHENERGAN) 25 MG tablet, Take 1 tablet by mouth every 8 hours as needed for Nausea 1-2 tabs q 4 hrs prn, Disp: 40 tablet, Rfl: 2    valACYclovir (VALTREX) 1 g tablet, Take 1 tablet by mouth daily, Disp: 30 tablet, Rfl: 2    Levonorgest-Eth Estrad 91-Day (CAMRESE LO) 0.1-0.02 & 0.01 MG TABS, Take as directed, Disp: 1 packet, Rfl: 0    ALPRAZolam (XANAX) 1 MG tablet, Take 1 tablet by mouth 2 times daily for 90 days. , Disp: 60 tablet, Rfl: 2    Cholecalciferol (VITAMIN D3) 50 MCG (2000 UT) TABS, Take 1 tablet by mouth daily, Disp: 90 tablet, Rfl: 3    fluticasone (FLONASE) 50 MCG/ACT nasal spray, 2 sprays by Nasal route daily, Disp: 1 each, Rfl: 5    ketoconazole (NIZORAL) 2 % shampoo, apply to affected area as directed USE TWO TO THREE TIMES WEEKLY, Disp: , Rfl:     hyoscyamine (LEVBID) 375 MCG extended release tablet, take 1 tablet by mouth every 12 hours, Disp: , Rfl:     loratadine (CLARITIN) 10 MG tablet, Take 1 tablet by mouth daily as needed (allergies), Disp: 30 tablet, Rfl: 5    Levothyroxine Sodium 75 MCG CAPS, Take 75 mcg by mouth Daily , Disp: , Rfl: albuterol sulfate  (90 Base) MCG/ACT inhaler, Inhale 2 puffs into the lungs every 4 hours as needed for Wheezing, Disp: 1 Inhaler, Rfl: 3    acetaminophen (TYLENOL) 500 MG tablet, Take 500 mg by mouth every 6 hours as needed for Pain, Disp: , Rfl:     amitriptyline (ELAVIL) 10 MG tablet, Take 10 mg by mouth nightly , Disp: , Rfl:     perphenazine 2 MG tablet, Take 2 mg by mouth daily , Disp: , Rfl:     VIBERZI 100 MG TABS, 1 tablet 2 times daily. , Disp: , Rfl: 0    Allergies   Allergen Reactions    Latex     Ibuprofen Other (See Comments)     NSAIDs trigger colitis        Past Medical History:   Diagnosis Date    Asthma     Sosa's esophagus without dysplasia 2019    Chronic allergic rhinitis 2019    Colitis 2019    Depression     Esophageal spasm 2019    Gastroesophageal reflux disease without esophagitis 2019    Generalized anxiety disorder 2019    Hyperthyroidism 2019    Impaired fasting glucose 2019    Insomnia     Irritable bowel syndrome     Seasonal allergies     Ulcerative colitis ([facility] [address]        Past Surgical History:   Procedure Laterality Date    NASAL SEPTUM SURGERY      biopsy     WISDOM TOOTH EXTRACTION         Family History   Problem Relation Age of Onset    Osteoarthritis Mother     Elevated Lipids Mother     Heart Disease Father     Other Father         back pain     Elevated Lipids Father     Colon Cancer Father     Colon Cancer Maternal Grandmother     Breast Cancer Maternal Aunt     Breast Cancer Paternal Aunt        Social History     Socioeconomic History    Marital status:      Spouse name: Not on file    Number of children: Not on file    Years of education: Not on file    Highest education level: Not on file   Occupational History    Not on file   Tobacco Use    Smoking status: Former     Packs/day: 0.50     Years: 1.50     Pack years: 0.75     Types: Cigarettes     Quit date: 1999     Years since quittin.3    Smokeless tobacco: Never   Substance and Sexual Activity    Alcohol use: Yes     Comment: socially     Drug use: Not on file    Sexual activity: Not on file   Other Topics Concern    Not on file   Social History Narrative    Not on file     Social Determinants of Health     Financial Resource Strain: Unknown    Difficulty of Paying Living Expenses: Patient refused   Food Insecurity: Unknown    Worried About Running Out of Food in the Last Year: Patient refused    Ran Out of Food in the Last Year: Patient refused   Transportation Needs: Not on file   Physical Activity: Not on file   Stress: Not on file   Social Connections: Not on file   Intimate Partner Violence: Not on file   Housing Stability: Not on file       Vitals:    12/07/22 0829   BP: 130/80   Site: Left Upper Arm   Position: Sitting   Cuff Size: Large Adult   Pulse: (!) 106   Resp: 18   Temp: 98.2 °F (36.8 °C)   TempSrc: Temporal   SpO2: 98%   Weight: 235 lb (106.6 kg)   Height: 5' 3\" (1.6 m)       Exam:  Physical Exam  Constitutional:       Appearance: She is well-developed. HENT:      Head: Normocephalic and atraumatic. Right Ear: External ear normal.      Left Ear: External ear normal.   Eyes:      Pupils: Pupils are equal, round, and reactive to light. Neck:      Thyroid: No thyromegaly. Cardiovascular:      Rate and Rhythm: Normal rate and regular rhythm. Heart sounds: Normal heart sounds. No murmur heard. Pulmonary:      Effort: Pulmonary effort is normal.      Breath sounds: Normal breath sounds. No wheezing. Abdominal:      General: Bowel sounds are normal. There is no distension. Palpations: Abdomen is soft. Tenderness: There is no abdominal tenderness. There is no guarding or rebound. Musculoskeletal:         General: Normal range of motion. Cervical back: Normal range of motion and neck supple. Lymphadenopathy:      Cervical: No cervical adenopathy. Skin:     General: Skin is warm and dry.    Neurological:      Mental Status: She is alert and oriented to person, place, and time. Cranial Nerves: No cranial nerve deficit. Psychiatric:         Judgment: Judgment normal.       Assessment and Plan:    Diagnoses and all orders for this visit:    Tachycardia  - uncertain etiology   - has hx of graves and would recheck labs   - referral to cardiology  - declined   - increased atenolol to 50mg twice a day   - improved after medication change   - stable today 12/7/2022    Moderate episode of recurrent major depressive disorder ([facility] [address]  - off paxil  - on duloxetine given helps also with joint pain at 60mg  - referral to psychology placed (12/2022)     Generalized anxiety disorder  - off paxil  - on duloxetine given helps also with joint pain at 60mg  - on xanax. Now increased to twice a day, asking for furthe rincrease   - suggest counseling - declines  - uncontrolled   - recommend referral - placed     [place] report reviewed (12/7/2022)  controlled substance agreement updated (2/25/20)    Patient requests benzodiazepine medication refill. I have reviewed the current medications and prior notes regarding the need for these refills. I believe the need for refill is warranted at this time. I have reviewed the [place]AR[place] report and found no suspicion of drug seeking behavior. I have discussed the side effects and narcotic/benzodiazepine policy with this patient and the patient has demonstrated understanding. A follow up appointment will be scheduled with me    Controlled Substance Monitoring:    Acute and Chronic Pain Monitoring:   RX Monitoring 12/7/2022   Periodic Controlled Substance Monitoring Possible medication side effects, risk of tolerance/dependence & alternative treatments discussed. ;No signs of potential drug abuse or diversion identified.        Gayle's esophagus without dysplasia  - last EGD 4/2021- no gayle seen, gastric polyps  - on omeprazole  - following with GI  - stable     Gastroesophageal reflux disease without esophagitis  - watch diet  - on omeprazole   - discussed long term use side effects  - recommended haldol by GI - did not tolerate side effects   - on perphen-amitriptyline for spasm   - last EGD 4/2021- no gayle seen, gastric polyps  - stable     Colitis - poss Ulcerative colitis with complication, unspecified location [facility])  - following up with GI  - uncertain etiology   - stopped lialda  - last colonoscopy (4/21) - normal colonoscopy, int hemorrhoids, path - colon mucosa w no signicant inflammation  - states exacerbation (11/2022) treated with mesalamine suppos    Impaired fasting glucose  hyperglycemia  - Follow A1c - last 8/2022 - 5.7    Mixed hyperlipidemia  - watch diet  - calculate 10 year risk   - not on medications at present   - may need to consider medications     Hyperthyroidism  - Graves' disease now hypothyroid  - following with endocrinology  - off methimazole  - on atenolol   - increased  levothyroxine to 75 mcg (3/22)   - thyroid nodules ultrasound shows stability    Esophageal spasm  - EGD (2019) - esophageal spasm and gastritis, no gayle  - recommended haldol by GI - did not tolerate   - concern with antipsychotic, long term use adverse reactions and other drug interactions and uncertain indication.   - on perphen-amitriptyline  - unclear if calcium channel blocker is contraindicated  - Marinol not covered    Post herpetic neuralgia  - on gabapentin and valtrex  - Stable    Hx of herpes zoster  - recurrent   - last exacerbation (12/2022)   - on valtrex daily suppressive   - increased valtrex tid for x 1 week   - refill     Chronic allergic rhinitis and Mild intermittent asthma without complication  - on singulair  - on flonase   - on antihistamine as needed  - stable     Vitamin D deficiency  - on vitamin D3 2000units daily   - follow labs     Primary osteoarthritis involving multiple joints  - uncertain etiology  - improved on duloxetine  - Stable     Acute pain of right knee  - poss strain and sprain   - alternate heat and ice   - compression - knee sleeve   - home exercises  - s/p MRI (7/2022)   - s/p injection to right knee     Long term use of drug    Return in about 3 months (around 3/7/2023) for check up and review and labs. Orders Placed This Encounter   Procedures    Comprehensive Metabolic Panel     Standing Status:   Future     Standing Expiration Date:   12/7/2023    Magnesium     Standing Status:   Future     Standing Expiration Date:   12/7/2023    Lipid, Fasting     Standing Status:   Future     Standing Expiration Date:   12/7/2023    Hemoglobin A1C     Standing Status:   Future     Standing Expiration Date:   12/7/2023    Vitamin D 25 Hydroxy     Standing Status:   Future     Standing Expiration Date:   12/7/2023    Urinalysis     Standing Status:   Future     Standing Expiration Date:   12/7/2023    Microalbumin, Ur     Standing Status:   Future     Standing Expiration Date:   12/7/2023    CBC with Auto Differential     Standing Status:   Future     Standing Expiration Date:   12/7/2023    CBC with Auto Differential     Standing Status:   Future     Standing Expiration Date:   12/7/2023    External Referral To Psychology     Referral Priority:   Routine     Referral Type:   Eval and Treat     Referral Reason:   Specialty Services Required     Requested Specialty:   Psychology     Number of Visits Requested:   [address] Jac[address]eline Madsen DO, OB/GYN, [facility]     Referral Priority:   Routine     Referral Type:   Eval and Treat     Referral Reason:   Specialty Services Required     Referred to Provider:   Lefty Scherer DO     Number of Visits Requested:   1       Requested Prescriptions     Signed Prescriptions Disp Refills    atenolol (TENORMIN) 50 MG tablet 60 tablet 2     Sig: Take 1 tablet by mouth in the morning and 1 tablet in the evening.     cyclobenzaprine (FLEXERIL) 5 MG tablet 60 tablet 2     Sig: Take 1 tablet by mouth 3 times daily as needed for Muscle spasms DULoxetine (CYMBALTA) 60 MG extended release capsule 30 capsule 2     Sig: Take 1 capsule by mouth daily    gabapentin (NEURONTIN) 600 MG tablet 75 tablet 2     Si tab 2-3 times qd    montelukast (SINGULAIR) 10 MG tablet 30 tablet 2     Sig: Take 1 tablet by mouth nightly    omeprazole (PRILOSEC) 40 MG delayed release capsule 30 capsule 2     Sig: Take 1 capsule by mouth daily    promethazine (PHENERGAN) 25 MG tablet 40 tablet 2     Sig: Take 1 tablet by mouth every 8 hours as needed for Nausea 1-2 tabs q 4 hrs prn    valACYclovir (VALTREX) 1 g tablet 30 tablet 2     Sig: Take 1 tablet by mouth daily    Levonorgest-Eth Estrad -Day (CAMRESE LO) 0.1-0.02 & 0.01 MG TABS 1 packet 0     Sig: Take as directed       Rodrigo Haddad MD  2022  9:12 AM

## 2022-12-07 NOTE — TELEPHONE ENCOUNTER
Please let the patient know that repeat CBC blood count showed    White blood cell count was again elevated but less than previous    MCV which is a measure of red cell size was again enlarged of uncertain cause    Other blood cell counts and test of size and shape of the cells were normal    Thanks

## 2022-12-08 DIAGNOSIS — K22.70 BARRETT'S ESOPHAGUS WITHOUT DYSPLASIA: ICD-10-CM

## 2022-12-08 DIAGNOSIS — K21.9 GASTROESOPHAGEAL REFLUX DISEASE WITHOUT ESOPHAGITIS: ICD-10-CM

## 2022-12-08 RX ORDER — PROMETHAZINE HYDROCHLORIDE 25 MG/1
25 TABLET ORAL EVERY 8 HOURS PRN
Qty: 40 TABLET | Refills: 2 | Status: SHIPPED | OUTPATIENT
Start: 2022-12-08

## 2022-12-08 NOTE — TELEPHONE ENCOUNTER
Last Appointment:  12/7/2022  Future Appointments   Date Time Provider Jeanette Skaggsi   12/15/2022  2:30 PM RUTH Chang - CNP [facility]   3/7/2023  1:00 PM León Gleason  [address] [place] [address]

## 2022-12-08 NOTE — TELEPHONE ENCOUNTER
Left a message for Aimee Barfield letting her know lab results have been reviewed. Result message sent to her MyChart.

## 2022-12-15 ENCOUNTER — OFFICE VISIT (OUTPATIENT)
Dept: ORTHOPEDIC SURGERY | Age: 42
End: 2022-12-15
Payer: COMMERCIAL

## 2022-12-15 DIAGNOSIS — M25.561 RIGHT KNEE PAIN, UNSPECIFIED CHRONICITY: Primary | ICD-10-CM

## 2022-12-15 PROCEDURE — G8417 CALC BMI ABV UP PARAM F/U: HCPCS | Performed by: NURSE PRACTITIONER

## 2022-12-15 PROCEDURE — 99213 OFFICE O/P EST LOW 20 MIN: CPT | Performed by: NURSE PRACTITIONER

## 2022-12-15 PROCEDURE — 1036F TOBACCO NON-USER: CPT | Performed by: NURSE PRACTITIONER

## 2022-12-15 PROCEDURE — G8484 FLU IMMUNIZE NO ADMIN: HCPCS | Performed by: NURSE PRACTITIONER

## 2022-12-15 PROCEDURE — G8427 DOCREV CUR MEDS BY ELIG CLIN: HCPCS | Performed by: NURSE PRACTITIONER

## 2022-12-15 NOTE — PROGRESS NOTES
Follow Up Visit     Ida Tanner returns today for follow up visit regarding Right Knee Pain. Treatment thus far has included Cortisone injection  with good improvement. She reports symptoms are improved. REVIEW OF SYSTEMS:     Constitutional:  Negative for weight loss, fevers, chills, fatigue  Cardiovascular: Negative for chest pain, palpitations  Pulmonary: Negative for shortness of breath, labored breathing, cough  GI: negative for abdominal pain, nausea, vomitting   MSK: per HPI  Skin: negative for rash, open wounds    All other systems reviewed and are negative       Physical Exam:     No data recorded    General: Alert and oriented x3, no acute distress  Cardiovascular/pulmonary: No labored breathing, peripheral perfusion intact  Musculoskeletal:    Right knee exam range of motion full, no swelling or palpable effusion, stable valgus and varus stress, joint line nontender    Controlled Substances Monitoring:      Imaging:  No new imaging obtained today. Previous imaging reviewed      Assessment: Right knee patellofemoral osteoarthritis      Plan: Today we discussed right knee. She reports symptoms improved following cortisone injection 3 months ago. He has known degenerative changes seen on x-ray and MRI showing full-thickness cartilage loss. We will continue to monitor to monitor for potential return of symptoms. She can continue with daily activities as tolerated. She will follow-up as needed.       RUTH Calloway-CNP  Orthopedic Surgery   12/15/22  9:16 PM

## 2022-12-19 RX ORDER — ALBUTEROL SULFATE 90 UG/1
2 AEROSOL, METERED RESPIRATORY (INHALATION) EVERY 4 HOURS PRN
Qty: 1 EACH | Refills: 3 | Status: SHIPPED | OUTPATIENT
Start: 2022-12-19

## 2023-03-01 DIAGNOSIS — J30.9 CHRONIC ALLERGIC RHINITIS: ICD-10-CM

## 2023-03-01 DIAGNOSIS — K22.4 ESOPHAGEAL SPASM: ICD-10-CM

## 2023-03-01 DIAGNOSIS — K21.9 GASTROESOPHAGEAL REFLUX DISEASE WITHOUT ESOPHAGITIS: ICD-10-CM

## 2023-03-01 DIAGNOSIS — K22.70 BARRETT'S ESOPHAGUS WITHOUT DYSPLASIA: ICD-10-CM

## 2023-03-01 RX ORDER — OMEPRAZOLE 40 MG/1
40 CAPSULE, DELAYED RELEASE ORAL DAILY
Qty: 30 CAPSULE | Refills: 0 | Status: SHIPPED | OUTPATIENT
Start: 2023-03-01

## 2023-03-01 RX ORDER — FLUTICASONE PROPIONATE 50 MCG
2 SPRAY, SUSPENSION (ML) NASAL DAILY
Qty: 1 EACH | Refills: 0 | Status: SHIPPED | OUTPATIENT
Start: 2023-03-01

## 2023-03-01 RX ORDER — MONTELUKAST SODIUM 10 MG/1
10 TABLET ORAL NIGHTLY
Qty: 30 TABLET | Refills: 0 | Status: SHIPPED | OUTPATIENT
Start: 2023-03-01

## 2023-03-01 NOTE — TELEPHONE ENCOUNTER
Last Appointment:  12/7/2022  Future Appointments   Date Time Provider Jeanette Nielsen   3/7/2023  1:00 PM Contreras Christianson  [address] [place] [address]

## 2023-03-01 NOTE — TELEPHONE ENCOUNTER
Last Appointment:  12/7/2022  Future Appointments   Date Time Provider Jeanette Nielsen   3/7/2023  1:00 PM Tammie Odom  [address] [place] [address]

## 2023-03-03 DIAGNOSIS — R73.01 IMPAIRED FASTING GLUCOSE: ICD-10-CM

## 2023-03-03 DIAGNOSIS — E55.9 VITAMIN D DEFICIENCY: ICD-10-CM

## 2023-03-03 DIAGNOSIS — E78.2 MIXED HYPERLIPIDEMIA: ICD-10-CM

## 2023-03-03 DIAGNOSIS — F33.1 MODERATE EPISODE OF RECURRENT MAJOR DEPRESSIVE DISORDER (HCC): ICD-10-CM

## 2023-03-03 DIAGNOSIS — F41.1 GENERALIZED ANXIETY DISORDER: ICD-10-CM

## 2023-03-03 LAB
ALBUMIN SERPL-MCNC: 4.2 G/DL (ref 3.5–5.2)
ALP BLD-CCNC: 81 U/L (ref 35–104)
ALT SERPL-CCNC: 16 U/L (ref 0–32)
ANION GAP SERPL CALCULATED.3IONS-SCNC: 20 MMOL/L (ref 7–16)
AST SERPL-CCNC: 19 U/L (ref 0–31)
BACTERIA: ABNORMAL /HPF
BASOPHILS ABSOLUTE: 0.06 E9/L (ref 0–0.2)
BASOPHILS RELATIVE PERCENT: 0.4 % (ref 0–2)
BILIRUB SERPL-MCNC: 0.3 MG/DL (ref 0–1.2)
BILIRUBIN URINE: NEGATIVE
BLOOD, URINE: NORMAL
BUN BLDV-MCNC: 7 MG/DL (ref 6–20)
CALCIUM SERPL-MCNC: 9.1 MG/DL (ref 8.6–10.2)
CHLORIDE BLD-SCNC: 109 MMOL/L (ref 98–107)
CHOLESTEROL, FASTING: 213 MG/DL (ref 0–199)
CLARITY: CLEAR
CO2: 15 MMOL/L (ref 22–29)
COLOR: YELLOW
CREAT SERPL-MCNC: 0.9 MG/DL (ref 0.5–1)
EOSINOPHILS ABSOLUTE: 0.3 E9/L (ref 0.05–0.5)
EOSINOPHILS RELATIVE PERCENT: 2.2 % (ref 0–6)
EPITHELIAL CELLS, UA: ABNORMAL /HPF
GFR SERPL CREATININE-BSD FRML MDRD: >60 ML/MIN/1.73
GLUCOSE BLD-MCNC: 105 MG/DL (ref 74–99)
GLUCOSE URINE: NEGATIVE MG/DL
HBA1C MFR BLD: 5.7 % (ref 4–5.6)
HCT VFR BLD CALC: 42.6 % (ref 34–48)
HDLC SERPL-MCNC: 38 MG/DL
HEMOGLOBIN: 13.8 G/DL (ref 11.5–15.5)
IMMATURE GRANULOCYTES #: 0.04 E9/L
IMMATURE GRANULOCYTES %: 0.3 % (ref 0–5)
KETONES, URINE: NEGATIVE MG/DL
LDL CHOLESTEROL CALCULATED: 121 MG/DL (ref 0–99)
LEUKOCYTE ESTERASE, URINE: NEGATIVE
LYMPHOCYTES ABSOLUTE: 3.83 E9/L (ref 1.5–4)
LYMPHOCYTES RELATIVE PERCENT: 28.2 % (ref 20–42)
MAGNESIUM: 2.1 MG/DL (ref 1.6–2.6)
MCH RBC QN AUTO: 33.7 PG (ref 26–35)
MCHC RBC AUTO-ENTMCNC: 32.4 % (ref 32–34.5)
MCV RBC AUTO: 104.2 FL (ref 80–99.9)
MICROALBUMIN UR-MCNC: <12 MG/L
MONOCYTES ABSOLUTE: 0.68 E9/L (ref 0.1–0.95)
MONOCYTES RELATIVE PERCENT: 5 % (ref 2–12)
NEUTROPHILS ABSOLUTE: 8.66 E9/L (ref 1.8–7.3)
NEUTROPHILS RELATIVE PERCENT: 63.9 % (ref 43–80)
NITRITE, URINE: NEGATIVE
PDW BLD-RTO: 12.6 FL (ref 11.5–15)
PH UA: 5.5 (ref 5–9)
PLATELET # BLD: 381 E9/L (ref 130–450)
PMV BLD AUTO: 10.7 FL (ref 7–12)
POTASSIUM SERPL-SCNC: 4.4 MMOL/L (ref 3.5–5)
PROTEIN UA: NEGATIVE MG/DL
RBC # BLD: 4.09 E12/L (ref 3.5–5.5)
RBC UA: ABNORMAL /HPF (ref 0–2)
SODIUM BLD-SCNC: 144 MMOL/L (ref 132–146)
SPECIFIC GRAVITY UA: 1.02 (ref 1–1.03)
T3 FREE: 3 PG/ML (ref 2–4.4)
T4 FREE: 1.35 NG/DL (ref 0.93–1.7)
TOTAL PROTEIN: 7.2 G/DL (ref 6.4–8.3)
TRIGLYCERIDE, FASTING: 272 MG/DL (ref 0–149)
TSH SERPL DL<=0.05 MIU/L-ACNC: 1.07 UIU/ML (ref 0.27–4.2)
UROBILINOGEN, URINE: 0.2 E.U./DL
VITAMIN D 25-HYDROXY: 7 NG/ML (ref 30–100)
VLDLC SERPL CALC-MCNC: 54 MG/DL
WBC # BLD: 13.6 E9/L (ref 4.5–11.5)
WBC UA: ABNORMAL /HPF (ref 0–5)

## 2023-03-03 RX ORDER — ALPRAZOLAM 1 MG/1
1 TABLET ORAL 2 TIMES DAILY
Qty: 60 TABLET | Refills: 2 | Status: SHIPPED | OUTPATIENT
Start: 2023-03-03 | End: 2023-06-01

## 2023-03-06 LAB — INSULIN: 19 UIU/ML

## 2023-03-07 ENCOUNTER — TELEPHONE (OUTPATIENT)
Dept: FAMILY MEDICINE CLINIC | Age: 43
End: 2023-03-07

## 2023-03-07 NOTE — LETTER
March 21, 2023       [facility] [address]      Dear Larissa Holstein:    ***    If you have any questions or concerns, please don't hesitate to call.     Sincerely,        Telly Busby MD

## 2023-03-07 NOTE — TELEPHONE ENCOUNTER
Please let the patient know that blood work results showed    Bicarbonate level was low, this could be consistent with metabolic acidosis. I am uncertain as to the exact cause of the metabolic acidosis but may need to consider sodium bicarbonate therapy and/or consideration for nephrology evaluation    Chloride level was also elevated    Sugar was elevated. Hemoglobin A1c is a measure 3-month sugar control was slightly elevated at 5.7 which is considered prediabetes    Blood counts still show slight white cell count being elevated and overall size of the red blood cells to be also increased    Overall blood counts for red blood cell count and platelet count were normal    Vitamin D was very decreased and would recommend supplement if not already taking    Cholesterol levels were again elevated. Triglyceride levels were again elevated.  The 10-year ASCVD risk score (Fran DK, et al., 2019) is: 1.6%, which is considered overall lower risk from the heart and vascular complications in the next 10 years    Urine analysis showed slight microscopic blood and bacteria but otherwise was normal with no evidence of microscopic protein    Thyroid blood test including TSH T4 and T3 ordered by endocrinology were normal    Insulin levels ordered by endocrinology appear normal but no established reference range    Other electrolytes, liver functions, and kidney function values were normal    Thanks

## 2023-03-13 DIAGNOSIS — K22.70 BARRETT'S ESOPHAGUS WITHOUT DYSPLASIA: ICD-10-CM

## 2023-03-13 DIAGNOSIS — K21.9 GASTROESOPHAGEAL REFLUX DISEASE WITHOUT ESOPHAGITIS: ICD-10-CM

## 2023-03-13 RX ORDER — PROMETHAZINE HYDROCHLORIDE 25 MG/1
25 TABLET ORAL EVERY 8 HOURS PRN
Qty: 40 TABLET | Refills: 0 | Status: SHIPPED | OUTPATIENT
Start: 2023-03-13

## 2023-03-13 RX ORDER — CYCLOBENZAPRINE HCL 5 MG
5 TABLET ORAL 3 TIMES DAILY PRN
Qty: 60 TABLET | Refills: 0 | Status: SHIPPED | OUTPATIENT
Start: 2023-03-13

## 2023-03-13 NOTE — TELEPHONE ENCOUNTER
Last Appointment:  12/7/2022  Future Appointments   Date Time Provider Jeanette Nielsen   3/27/2023  2:30 PM Erin Bustos  [address]

## 2023-03-27 ENCOUNTER — TELEMEDICINE (OUTPATIENT)
Dept: FAMILY MEDICINE CLINIC | Age: 43
End: 2023-03-27
Payer: COMMERCIAL

## 2023-03-27 DIAGNOSIS — R00.0 TACHYCARDIA: ICD-10-CM

## 2023-03-27 DIAGNOSIS — J30.9 CHRONIC ALLERGIC RHINITIS: ICD-10-CM

## 2023-03-27 DIAGNOSIS — F41.1 GENERALIZED ANXIETY DISORDER: ICD-10-CM

## 2023-03-27 DIAGNOSIS — K22.4 ESOPHAGEAL SPASM: ICD-10-CM

## 2023-03-27 DIAGNOSIS — F33.1 MODERATE EPISODE OF RECURRENT MAJOR DEPRESSIVE DISORDER (HCC): Primary | ICD-10-CM

## 2023-03-27 DIAGNOSIS — K52.9 COLITIS: ICD-10-CM

## 2023-03-27 DIAGNOSIS — E05.90 HYPERTHYROIDISM: ICD-10-CM

## 2023-03-27 DIAGNOSIS — K21.9 GASTROESOPHAGEAL REFLUX DISEASE WITHOUT ESOPHAGITIS: ICD-10-CM

## 2023-03-27 DIAGNOSIS — K22.70 BARRETT'S ESOPHAGUS WITHOUT DYSPLASIA: ICD-10-CM

## 2023-03-27 DIAGNOSIS — R73.01 IMPAIRED FASTING GLUCOSE: ICD-10-CM

## 2023-03-27 DIAGNOSIS — M15.9 PRIMARY OSTEOARTHRITIS INVOLVING MULTIPLE JOINTS: ICD-10-CM

## 2023-03-27 DIAGNOSIS — B02.29 POST HERPETIC NEURALGIA: ICD-10-CM

## 2023-03-27 DIAGNOSIS — E78.2 MIXED HYPERLIPIDEMIA: ICD-10-CM

## 2023-03-27 DIAGNOSIS — N92.6 IRREGULAR MENSTRUAL CYCLE: ICD-10-CM

## 2023-03-27 DIAGNOSIS — E55.9 VITAMIN D DEFICIENCY: ICD-10-CM

## 2023-03-27 PROCEDURE — 99214 OFFICE O/P EST MOD 30 MIN: CPT | Performed by: INTERNAL MEDICINE

## 2023-03-27 PROCEDURE — G8427 DOCREV CUR MEDS BY ELIG CLIN: HCPCS | Performed by: INTERNAL MEDICINE

## 2023-03-27 RX ORDER — CHOLECALCIFEROL (VITAMIN D3) 1250 MCG
CAPSULE ORAL
COMMUNITY

## 2023-03-27 RX ORDER — VALACYCLOVIR HYDROCHLORIDE 1 G/1
1000 TABLET, FILM COATED ORAL 3 TIMES DAILY
Qty: 21 TABLET | Refills: 0 | Status: SHIPPED | OUTPATIENT
Start: 2023-03-27 | End: 2023-04-03

## 2023-03-27 RX ORDER — EZETIMIBE 10 MG/1
TABLET ORAL
COMMUNITY

## 2023-03-27 RX ORDER — METHYLPREDNISOLONE 4 MG/1
TABLET ORAL
Qty: 1 KIT | Refills: 0 | Status: SHIPPED | OUTPATIENT
Start: 2023-03-27 | End: 2023-04-02

## 2023-03-27 RX ORDER — DULOXETIN HYDROCHLORIDE 60 MG/1
60 CAPSULE, DELAYED RELEASE ORAL DAILY
Qty: 30 CAPSULE | Refills: 2 | Status: SHIPPED | OUTPATIENT
Start: 2023-03-27

## 2023-03-27 RX ORDER — FLUTICASONE PROPIONATE 50 MCG
2 SPRAY, SUSPENSION (ML) NASAL DAILY
Qty: 1 EACH | Refills: 2 | Status: SHIPPED | OUTPATIENT
Start: 2023-03-27

## 2023-03-27 RX ORDER — OMEPRAZOLE 40 MG/1
40 CAPSULE, DELAYED RELEASE ORAL DAILY
Qty: 30 CAPSULE | Refills: 2 | Status: SHIPPED | OUTPATIENT
Start: 2023-03-27

## 2023-03-27 RX ORDER — ATENOLOL 50 MG/1
50 TABLET ORAL 2 TIMES DAILY
Qty: 60 TABLET | Refills: 2 | Status: SHIPPED | OUTPATIENT
Start: 2023-03-27

## 2023-03-27 RX ORDER — ATENOLOL 25 MG/1
50 TABLET ORAL 2 TIMES DAILY
COMMUNITY
Start: 2023-01-31 | End: 2023-03-27 | Stop reason: SDUPTHER

## 2023-03-27 RX ORDER — MONTELUKAST SODIUM 10 MG/1
10 TABLET ORAL NIGHTLY
Qty: 30 TABLET | Refills: 2 | Status: SHIPPED | OUTPATIENT
Start: 2023-03-27

## 2023-03-27 RX ORDER — GABAPENTIN 600 MG/1
TABLET ORAL
Qty: 75 TABLET | Refills: 2 | Status: SHIPPED | OUTPATIENT
Start: 2023-03-27 | End: 2023-07-15

## 2023-03-27 SDOH — ECONOMIC STABILITY: HOUSING INSECURITY
IN THE LAST 12 MONTHS, WAS THERE A TIME WHEN YOU DID NOT HAVE A STEADY PLACE TO SLEEP OR SLEPT IN A SHELTER (INCLUDING NOW)?: PATIENT REFUSED

## 2023-03-27 SDOH — ECONOMIC STABILITY: FOOD INSECURITY: WITHIN THE PAST 12 MONTHS, THE FOOD YOU BOUGHT JUST DIDN'T LAST AND YOU DIDN'T HAVE MONEY TO GET MORE.: PATIENT DECLINED

## 2023-03-27 SDOH — ECONOMIC STABILITY: FOOD INSECURITY: WITHIN THE PAST 12 MONTHS, YOU WORRIED THAT YOUR FOOD WOULD RUN OUT BEFORE YOU GOT MONEY TO BUY MORE.: PATIENT DECLINED

## 2023-03-27 SDOH — ECONOMIC STABILITY: INCOME INSECURITY: HOW HARD IS IT FOR YOU TO PAY FOR THE VERY BASICS LIKE FOOD, HOUSING, MEDICAL CARE, AND HEATING?: PATIENT DECLINED

## 2023-03-27 ASSESSMENT — ENCOUNTER SYMPTOMS
CHEST TIGHTNESS: 0
CONSTIPATION: 0
COUGH: 0
SORE THROAT: 0
RHINORRHEA: 0
DIARRHEA: 0
ABDOMINAL PAIN: 0
EYE PAIN: 0
BLOOD IN STOOL: 0
VOMITING: 0
SHORTNESS OF BREATH: 0
NAUSEA: 0

## 2023-03-27 NOTE — PROGRESS NOTES
TeleMedicine Video Visit    Samantha Riley, was evaluated through a synchronous (real-time) audio-video encounter. The patient (or guardian if applicable) is aware that this is a billable service. , which includes applicable co-pays. This virtual visit was conducted with the patient's  (and/or legal guardian's) consent. The visit was conducted pursuant to the emergency declaration under the [address] [place] [address], [address] [place] [address] authority and the [employer] Act. Patient identification was verified, and a caregiver was present when appropriate. The patient was located in a state where the provider was licensed to provide care. Patient identification was verified at the start of the visit, including the patient's telephone number and physical location. I discussed with the patient the nature of our telehealth visits, that:     Due to the nature of an audio- video modality, the only components of a physical exam that could be done are the elements supported by direct observation. I would evaluate the patient and recommend diagnostics and treatments based on my assessment. If it was felt that the patient should be evaluated in clinic or an emergency room setting, then they would be directed there. Our sessions are not being recorded and that personal health information is protected. Our team would provide follow up care in person if/when the patient needs it. Patient's location: home address in [place] [address]  location  office Jonathon Monsalve   other people involved in call  none       This visit was completed virtually using Doxy. me    3/27/2023    TELEHEALTH EVALUATION -- Audio/Visual (During ICDEQ-91 public health emergency)    HPI:    Samantha Riley (:  1980) has requested an audio/video evaluation for the following concern(s):    -  has lost job 1 month ago.      -  has been having flare of shingles to

## 2023-04-10 DIAGNOSIS — B02.29 POST HERPETIC NEURALGIA: ICD-10-CM

## 2023-04-10 RX ORDER — VALACYCLOVIR HYDROCHLORIDE 1 G/1
1000 TABLET, FILM COATED ORAL DAILY
Qty: 30 TABLET | Refills: 2 | Status: CANCELLED | OUTPATIENT
Start: 2023-04-10

## 2023-05-02 ENCOUNTER — OFFICE VISIT (OUTPATIENT)
Dept: ORTHOPEDIC SURGERY | Age: 43
End: 2023-05-02
Payer: COMMERCIAL

## 2023-05-02 VITALS — WEIGHT: 220 LBS | BODY MASS INDEX: 38.98 KG/M2 | HEIGHT: 63 IN

## 2023-05-02 DIAGNOSIS — M17.11 OSTEOARTHRITIS OF RIGHT KNEE, UNSPECIFIED OSTEOARTHRITIS TYPE: Primary | ICD-10-CM

## 2023-05-02 DIAGNOSIS — K22.70 BARRETT'S ESOPHAGUS WITHOUT DYSPLASIA: ICD-10-CM

## 2023-05-02 DIAGNOSIS — K21.9 GASTROESOPHAGEAL REFLUX DISEASE WITHOUT ESOPHAGITIS: ICD-10-CM

## 2023-05-02 PROCEDURE — 20610 DRAIN/INJ JOINT/BURSA W/O US: CPT | Performed by: NURSE PRACTITIONER

## 2023-05-02 PROCEDURE — G8417 CALC BMI ABV UP PARAM F/U: HCPCS | Performed by: NURSE PRACTITIONER

## 2023-05-02 PROCEDURE — G8427 DOCREV CUR MEDS BY ELIG CLIN: HCPCS | Performed by: NURSE PRACTITIONER

## 2023-05-02 PROCEDURE — 1036F TOBACCO NON-USER: CPT | Performed by: NURSE PRACTITIONER

## 2023-05-02 PROCEDURE — 99213 OFFICE O/P EST LOW 20 MIN: CPT | Performed by: NURSE PRACTITIONER

## 2023-05-02 RX ORDER — TRIAMCINOLONE ACETONIDE 40 MG/ML
40 INJECTION, SUSPENSION INTRA-ARTICULAR; INTRAMUSCULAR ONCE
Status: COMPLETED | OUTPATIENT
Start: 2023-05-02 | End: 2023-05-02

## 2023-05-02 RX ORDER — BUPIVACAINE HYDROCHLORIDE 2.5 MG/ML
2 INJECTION, SOLUTION INFILTRATION; PERINEURAL ONCE
Status: COMPLETED | OUTPATIENT
Start: 2023-05-02 | End: 2023-05-02

## 2023-05-02 RX ORDER — PROMETHAZINE HYDROCHLORIDE 25 MG/1
25 TABLET ORAL EVERY 8 HOURS PRN
Qty: 40 TABLET | Refills: 0 | Status: SHIPPED | OUTPATIENT
Start: 2023-05-02

## 2023-05-02 RX ADMIN — BUPIVACAINE HYDROCHLORIDE 5 MG: 2.5 INJECTION, SOLUTION INFILTRATION; PERINEURAL at 16:19

## 2023-05-02 RX ADMIN — TRIAMCINOLONE ACETONIDE 40 MG: 40 INJECTION, SUSPENSION INTRA-ARTICULAR; INTRAMUSCULAR at 16:20

## 2023-05-02 NOTE — PROGRESS NOTES
[facility]   ORTHOPAEDIC SURGERY AND SPORTS MEDICINE  DATE OF VISIT: 05/02/23  Follow Up Visit     CHIEF COMPLAINT:   Chief Complaint   Patient presents with    Follow-up     Right knee patellofemoral osteoarthritis    Pain     Rt knee lateral - posterior pain : 4 / 10    Injections     Rt knee requesting injection today       HPI:    Kierra Trivedi is a 38y.o. year old female who presented to the office today for follow up of Right knee patellofemoral osteoarthritis, previously evaluated on 12/15/2022. Previous treatment has included cortisone injection back in August 2022. At her last appointment in December reported symptoms continue to be improved. She reports recent return of pain. The patient has responded to the treatment. REVIEW OF SYSTEMS:     General: Negative Fever, chills, fatigue   Cardiovascular: Negative chest pain, palpitations  Respiratory: Equal chest expansion, negative shortness of breath   Skin: Negative rash, open wounds  Psych: Normal affect, mood stable  Neurologic: sensation grossly intact in extremities   Musculoskeletal: See HPI      Physical Exam:     Height: 5' 3\" (1.6 m), Weight - Scale: 220 lb (99.8 kg) (per pt)    General: Alert and oriented x3, no acute distress  Cardiovascular/pulmonary: No labored breathing, peripheral perfusion intact  Musculoskeletal:    Right knee exam range of motion 0-130, palpable posterior Baker's cyst that is nontender. Posterior knee pain with full knee flexion. Negative anterior joint line tenderness. Negative knee effusion. Stable valgus/varus. Patella tracked midline with patellofemoral crepitus    Controlled Substances Monitoring:      Imaging:  Preimaging reviewed showing degenerative changes to the patellofemoral compartment    Procedure Note: Knee Cortisone injection     The right knee was identified as the injection site. The risk and benefits of a cortisone injection were explained and the patient consented to the injection.  Under

## 2023-05-02 NOTE — TELEPHONE ENCOUNTER
Last Appointment:  3/27/2023  Future Appointments   Date Time Provider Jeanette Gia   5/2/2023  3:00 PM RUTH Hudson - CNP [facility]   6/27/2023  1:30 PM Allison Grace  [address] [place] [address]

## 2023-05-09 RX ORDER — CYCLOBENZAPRINE HCL 5 MG
5 TABLET ORAL 3 TIMES DAILY PRN
Qty: 60 TABLET | Refills: 0 | Status: SHIPPED | OUTPATIENT
Start: 2023-05-09

## 2023-05-09 NOTE — TELEPHONE ENCOUNTER
Last Appointment:  3/27/2023  Future Appointments   Date Time Provider Jeanette Nielsen   6/27/2023  1:30 PM Tucker Mohamud  [address]

## 2023-05-30 DIAGNOSIS — K21.9 GASTROESOPHAGEAL REFLUX DISEASE WITHOUT ESOPHAGITIS: ICD-10-CM

## 2023-05-30 DIAGNOSIS — K22.70 BARRETT'S ESOPHAGUS WITHOUT DYSPLASIA: ICD-10-CM

## 2023-05-30 DIAGNOSIS — F41.1 GENERALIZED ANXIETY DISORDER: ICD-10-CM

## 2023-05-30 DIAGNOSIS — F33.1 MODERATE EPISODE OF RECURRENT MAJOR DEPRESSIVE DISORDER (HCC): ICD-10-CM

## 2023-05-30 RX ORDER — PROMETHAZINE HYDROCHLORIDE 25 MG/1
25 TABLET ORAL EVERY 8 HOURS PRN
Qty: 40 TABLET | Refills: 0 | Status: SHIPPED
Start: 2023-05-30 | End: 2023-06-27 | Stop reason: SDUPTHER

## 2023-05-30 RX ORDER — ALPRAZOLAM 1 MG/1
1 TABLET ORAL 2 TIMES DAILY
Qty: 60 TABLET | Refills: 0 | Status: SHIPPED
Start: 2023-05-30 | End: 2023-06-27 | Stop reason: SDUPTHER

## 2023-06-06 ENCOUNTER — OFFICE VISIT (OUTPATIENT)
Dept: ORTHOPEDIC SURGERY | Age: 43
End: 2023-06-06

## 2023-06-06 VITALS — BODY MASS INDEX: 38.98 KG/M2 | WEIGHT: 220 LBS | HEIGHT: 63 IN

## 2023-06-06 DIAGNOSIS — M71.21 BAKER'S CYST OF KNEE, RIGHT: ICD-10-CM

## 2023-06-06 DIAGNOSIS — G89.29 CHRONIC PAIN OF RIGHT KNEE: Primary | ICD-10-CM

## 2023-06-06 DIAGNOSIS — M25.561 CHRONIC PAIN OF RIGHT KNEE: Primary | ICD-10-CM

## 2023-06-06 RX ORDER — LIDOCAINE HYDROCHLORIDE 10 MG/ML
3 INJECTION, SOLUTION INFILTRATION; PERINEURAL ONCE
Status: COMPLETED | OUTPATIENT
Start: 2023-06-06 | End: 2023-06-06

## 2023-06-06 RX ADMIN — LIDOCAINE HYDROCHLORIDE 3 ML: 10 INJECTION, SOLUTION INFILTRATION; PERINEURAL at 08:53

## 2023-06-06 NOTE — PROGRESS NOTES
PROCEDURE NOTE:    DIAGNOSIS    RIGHT knee pain. RIGHT popliteal cyst    PROCEDURE    Ultrasound-guided RIGHT Baker's cyst aspiration and corticosteroid injection    PROCEDURAL PAUSE    Procedural pause conducted to verify: correct patient identity, procedure to be performed, and as applicable, correct side and site, correct patient position, and availability of implants, special equipment, or special requirements. PROCEDURE DETAILS    The procedure was carried out under sterile technique. Patient Position: Prone    Localization Process: The popliteal space was evaluated under ultrasound prior to starting the procedure and there was moderate sized Baker's cyst noted. The skin was prepped with Betadine and Alcohol. Approach: In-plane. Local Anesthesia: Local anesthesia was obtained with vapocoolant cold spray and 1 cc of 1% lidocaine using a 30-gauge 1-1/4-inch needle to create a skin wheal.    Injection/Aspiration: An 18-gauge 1.5-inch needle was advanced from an in-plane, distal to proximal into the Baker cyst, 7 ccs of yellow serosanguinous fluid was aspirated, then 2 cc of 1% lidocaine without epinephrine was injected into the collapsed cyst with excellent sonographic flow. Images of procedure were permanently recorded. Postprocedure Care: The patient will avoid heavy exertion with the knee and avoid soaking the knee under water for two days. The patient will contact me with any problems related to the injection. PATIENT EDUCATION    Ready to learn, no apparent learning barriers were identified; learning preferences include listening. Explained diagnosis and treatment plan; patient expressed understanding of the content. INFORMED CONSENT    Discussed the risks, benefits, alternatives, and the necessity of other members of the healthcare team participating in the procedure. All questions answered and consent given.     Following denial of allergy and review of potential side effects and

## 2023-06-09 RX ORDER — CYCLOBENZAPRINE HCL 5 MG
5 TABLET ORAL 3 TIMES DAILY PRN
Qty: 60 TABLET | Refills: 0 | Status: SHIPPED | OUTPATIENT
Start: 2023-06-09

## 2023-06-27 ENCOUNTER — OFFICE VISIT (OUTPATIENT)
Dept: FAMILY MEDICINE CLINIC | Age: 43
End: 2023-06-27
Payer: COMMERCIAL

## 2023-06-27 VITALS
SYSTOLIC BLOOD PRESSURE: 124 MMHG | OXYGEN SATURATION: 100 % | TEMPERATURE: 97.8 F | BODY MASS INDEX: 39.69 KG/M2 | HEIGHT: 63 IN | HEART RATE: 104 BPM | RESPIRATION RATE: 20 BRPM | WEIGHT: 224 LBS | DIASTOLIC BLOOD PRESSURE: 80 MMHG

## 2023-06-27 DIAGNOSIS — E05.90 HYPERTHYROIDISM: ICD-10-CM

## 2023-06-27 DIAGNOSIS — R73.01 IMPAIRED FASTING GLUCOSE: ICD-10-CM

## 2023-06-27 DIAGNOSIS — E55.9 VITAMIN D DEFICIENCY: ICD-10-CM

## 2023-06-27 DIAGNOSIS — E78.2 MIXED HYPERLIPIDEMIA: ICD-10-CM

## 2023-06-27 DIAGNOSIS — M15.9 PRIMARY OSTEOARTHRITIS INVOLVING MULTIPLE JOINTS: ICD-10-CM

## 2023-06-27 DIAGNOSIS — B02.29 POST HERPETIC NEURALGIA: ICD-10-CM

## 2023-06-27 DIAGNOSIS — Z86.19 HISTORY OF SHINGLES: Primary | ICD-10-CM

## 2023-06-27 DIAGNOSIS — J30.9 CHRONIC ALLERGIC RHINITIS: ICD-10-CM

## 2023-06-27 DIAGNOSIS — R00.0 TACHYCARDIA: ICD-10-CM

## 2023-06-27 DIAGNOSIS — F33.1 MODERATE EPISODE OF RECURRENT MAJOR DEPRESSIVE DISORDER (HCC): ICD-10-CM

## 2023-06-27 DIAGNOSIS — K21.9 GASTROESOPHAGEAL REFLUX DISEASE WITHOUT ESOPHAGITIS: ICD-10-CM

## 2023-06-27 DIAGNOSIS — F41.1 GENERALIZED ANXIETY DISORDER: ICD-10-CM

## 2023-06-27 DIAGNOSIS — K52.9 COLITIS: ICD-10-CM

## 2023-06-27 DIAGNOSIS — K22.4 ESOPHAGEAL SPASM: ICD-10-CM

## 2023-06-27 DIAGNOSIS — K22.70 BARRETT'S ESOPHAGUS WITHOUT DYSPLASIA: ICD-10-CM

## 2023-06-27 LAB
ALBUMIN SERPL-MCNC: 4 G/DL (ref 3.5–5.2)
ALP SERPL-CCNC: 69 U/L (ref 35–104)
ALT SERPL-CCNC: 13 U/L (ref 0–32)
ANION GAP SERPL CALCULATED.3IONS-SCNC: 15 MMOL/L (ref 7–16)
AST SERPL-CCNC: 18 U/L (ref 0–31)
BILIRUB SERPL-MCNC: <0.2 MG/DL (ref 0–1.2)
BUN SERPL-MCNC: 8 MG/DL (ref 6–20)
CALCIUM SERPL-MCNC: 8.8 MG/DL (ref 8.6–10.2)
CHLORIDE SERPL-SCNC: 103 MMOL/L (ref 98–107)
CO2 SERPL-SCNC: 21 MMOL/L (ref 22–29)
CREAT SERPL-MCNC: 0.9 MG/DL (ref 0.5–1)
GLUCOSE SERPL-MCNC: 102 MG/DL (ref 74–99)
POTASSIUM SERPL-SCNC: 4 MMOL/L (ref 3.5–5)
PROT SERPL-MCNC: 7 G/DL (ref 6.4–8.3)
SODIUM SERPL-SCNC: 139 MMOL/L (ref 132–146)
VITAMIN D 25-HYDROXY: 24 NG/ML (ref 30–100)

## 2023-06-27 PROCEDURE — 1036F TOBACCO NON-USER: CPT | Performed by: INTERNAL MEDICINE

## 2023-06-27 PROCEDURE — 99214 OFFICE O/P EST MOD 30 MIN: CPT | Performed by: INTERNAL MEDICINE

## 2023-06-27 PROCEDURE — G8427 DOCREV CUR MEDS BY ELIG CLIN: HCPCS | Performed by: INTERNAL MEDICINE

## 2023-06-27 PROCEDURE — G8417 CALC BMI ABV UP PARAM F/U: HCPCS | Performed by: INTERNAL MEDICINE

## 2023-06-27 RX ORDER — GABAPENTIN 600 MG/1
TABLET ORAL
Qty: 75 TABLET | Refills: 2 | Status: CANCELLED | OUTPATIENT
Start: 2023-06-27 | End: 2023-10-15

## 2023-06-27 RX ORDER — OMEPRAZOLE 40 MG/1
40 CAPSULE, DELAYED RELEASE ORAL DAILY
Qty: 30 CAPSULE | Refills: 2 | Status: SHIPPED | OUTPATIENT
Start: 2023-06-27

## 2023-06-27 RX ORDER — MONTELUKAST SODIUM 10 MG/1
10 TABLET ORAL NIGHTLY
Qty: 30 TABLET | Refills: 2 | Status: SHIPPED | OUTPATIENT
Start: 2023-06-27

## 2023-06-27 RX ORDER — LORATADINE 10 MG/1
10 TABLET ORAL DAILY PRN
Qty: 30 TABLET | Refills: 2 | Status: SHIPPED | OUTPATIENT
Start: 2023-06-27

## 2023-06-27 RX ORDER — ALPRAZOLAM 1 MG/1
1 TABLET ORAL 2 TIMES DAILY
Qty: 60 TABLET | Refills: 2 | Status: SHIPPED | OUTPATIENT
Start: 2023-06-27 | End: 2023-09-25

## 2023-06-27 RX ORDER — ATENOLOL 50 MG/1
50 TABLET ORAL 2 TIMES DAILY
Qty: 60 TABLET | Refills: 2 | Status: SHIPPED | OUTPATIENT
Start: 2023-06-27

## 2023-06-27 RX ORDER — DULOXETIN HYDROCHLORIDE 60 MG/1
60 CAPSULE, DELAYED RELEASE ORAL DAILY
Qty: 30 CAPSULE | Refills: 2 | Status: SHIPPED | OUTPATIENT
Start: 2023-06-27

## 2023-06-27 RX ORDER — VALACYCLOVIR HYDROCHLORIDE 1 G/1
1000 TABLET, FILM COATED ORAL DAILY
Qty: 30 TABLET | Refills: 2 | Status: SHIPPED | OUTPATIENT
Start: 2023-06-27

## 2023-06-27 RX ORDER — FLUTICASONE PROPIONATE 50 MCG
2 SPRAY, SUSPENSION (ML) NASAL DAILY
Qty: 1 EACH | Refills: 2 | Status: SHIPPED | OUTPATIENT
Start: 2023-06-27

## 2023-06-27 RX ORDER — ACETAMINOPHEN 160 MG
TABLET,DISINTEGRATING ORAL
COMMUNITY

## 2023-06-27 RX ORDER — PROMETHAZINE HYDROCHLORIDE 25 MG/1
25 TABLET ORAL EVERY 8 HOURS PRN
Qty: 40 TABLET | Refills: 2 | Status: SHIPPED | OUTPATIENT
Start: 2023-06-27

## 2023-06-27 ASSESSMENT — PATIENT HEALTH QUESTIONNAIRE - PHQ9
6. FEELING BAD ABOUT YOURSELF - OR THAT YOU ARE A FAILURE OR HAVE LET YOURSELF OR YOUR FAMILY DOWN: 0
9. THOUGHTS THAT YOU WOULD BE BETTER OFF DEAD, OR OF HURTING YOURSELF: 0
3. TROUBLE FALLING OR STAYING ASLEEP: 0
1. LITTLE INTEREST OR PLEASURE IN DOING THINGS: 1
10. IF YOU CHECKED OFF ANY PROBLEMS, HOW DIFFICULT HAVE THESE PROBLEMS MADE IT FOR YOU TO DO YOUR WORK, TAKE CARE OF THINGS AT HOME, OR GET ALONG WITH OTHER PEOPLE: 0
SUM OF ALL RESPONSES TO PHQ9 QUESTIONS 1 & 2: 1
5. POOR APPETITE OR OVEREATING: 0
4. FEELING TIRED OR HAVING LITTLE ENERGY: 2
SUM OF ALL RESPONSES TO PHQ QUESTIONS 1-9: 4
2. FEELING DOWN, DEPRESSED OR HOPELESS: 0
SUM OF ALL RESPONSES TO PHQ QUESTIONS 1-9: 4
SUM OF ALL RESPONSES TO PHQ QUESTIONS 1-9: 4
7. TROUBLE CONCENTRATING ON THINGS, SUCH AS READING THE NEWSPAPER OR WATCHING TELEVISION: 1
SUM OF ALL RESPONSES TO PHQ QUESTIONS 1-9: 4
8. MOVING OR SPEAKING SO SLOWLY THAT OTHER PEOPLE COULD HAVE NOTICED. OR THE OPPOSITE, BEING SO FIGETY OR RESTLESS THAT YOU HAVE BEEN MOVING AROUND A LOT MORE THAN USUAL: 0

## 2023-06-27 ASSESSMENT — ENCOUNTER SYMPTOMS
ABDOMINAL PAIN: 0
SHORTNESS OF BREATH: 0
EYE PAIN: 0
SORE THROAT: 0
NAUSEA: 0
COUGH: 0
VOMITING: 0
DIARRHEA: 0
CONSTIPATION: 0
CHEST TIGHTNESS: 0
BLOOD IN STOOL: 0
RHINORRHEA: 0

## 2023-06-28 ENCOUNTER — TELEPHONE (OUTPATIENT)
Dept: PRIMARY CARE CLINIC | Age: 43
End: 2023-06-28

## 2023-07-10 RX ORDER — CYCLOBENZAPRINE HCL 5 MG
5 TABLET ORAL 3 TIMES DAILY PRN
Qty: 60 TABLET | Refills: 0 | Status: SHIPPED | OUTPATIENT
Start: 2023-07-10

## 2023-07-10 NOTE — TELEPHONE ENCOUNTER
Last Appointment:  6/27/2023  Future Appointments   Date Time Provider [address]   9/25/2023  3:15 PM Sariah Ness  [facility] [place]

## 2023-07-17 NOTE — TELEPHONE ENCOUNTER
I left message to return call.
Okay for work excuse
Pt notified and faxed to pt employer at [phone]
Pt would like to be excused from work tomorrow. She had a shingles flair up. Please advise.
36.9

## 2023-07-25 ENCOUNTER — OFFICE VISIT (OUTPATIENT)
Dept: ORTHOPEDIC SURGERY | Age: 43
End: 2023-07-25
Payer: COMMERCIAL

## 2023-07-25 VITALS — BODY MASS INDEX: 39.69 KG/M2 | HEIGHT: 63 IN | WEIGHT: 224 LBS

## 2023-07-25 DIAGNOSIS — M25.562 LEFT KNEE PAIN, UNSPECIFIED CHRONICITY: Primary | ICD-10-CM

## 2023-07-25 DIAGNOSIS — K21.9 GASTROESOPHAGEAL REFLUX DISEASE WITHOUT ESOPHAGITIS: ICD-10-CM

## 2023-07-25 DIAGNOSIS — K22.70 BARRETT'S ESOPHAGUS WITHOUT DYSPLASIA: ICD-10-CM

## 2023-07-25 PROCEDURE — 20610 DRAIN/INJ JOINT/BURSA W/O US: CPT | Performed by: NURSE PRACTITIONER

## 2023-07-25 PROCEDURE — G8427 DOCREV CUR MEDS BY ELIG CLIN: HCPCS | Performed by: NURSE PRACTITIONER

## 2023-07-25 PROCEDURE — G8417 CALC BMI ABV UP PARAM F/U: HCPCS | Performed by: NURSE PRACTITIONER

## 2023-07-25 PROCEDURE — 99213 OFFICE O/P EST LOW 20 MIN: CPT | Performed by: NURSE PRACTITIONER

## 2023-07-25 PROCEDURE — 1036F TOBACCO NON-USER: CPT | Performed by: NURSE PRACTITIONER

## 2023-07-25 RX ORDER — TRIAMCINOLONE ACETONIDE 40 MG/ML
40 INJECTION, SUSPENSION INTRA-ARTICULAR; INTRAMUSCULAR ONCE
Status: COMPLETED | OUTPATIENT
Start: 2023-07-25 | End: 2023-07-25

## 2023-07-25 RX ORDER — LEVONORGESTREL AND ETHINYL ESTRADIOL 100-20(84)
KIT ORAL
Qty: 1 PACKET | Refills: 0 | Status: SHIPPED | OUTPATIENT
Start: 2023-07-25

## 2023-07-25 RX ORDER — BUPIVACAINE HYDROCHLORIDE 2.5 MG/ML
2 INJECTION, SOLUTION INFILTRATION; PERINEURAL ONCE
Status: COMPLETED | OUTPATIENT
Start: 2023-07-25 | End: 2023-07-25

## 2023-07-25 RX ORDER — PROMETHAZINE HYDROCHLORIDE 25 MG/1
25 TABLET ORAL EVERY 8 HOURS PRN
Qty: 40 TABLET | Refills: 2 | Status: SHIPPED | OUTPATIENT
Start: 2023-07-25

## 2023-07-25 RX ADMIN — BUPIVACAINE HYDROCHLORIDE 5 MG: 2.5 INJECTION, SOLUTION INFILTRATION; PERINEURAL at 14:31

## 2023-07-25 RX ADMIN — TRIAMCINOLONE ACETONIDE 40 MG: 40 INJECTION, SUSPENSION INTRA-ARTICULAR; INTRAMUSCULAR at 14:31

## 2023-07-25 NOTE — TELEPHONE ENCOUNTER
Last Appointment:  6/27/2023  Future Appointments   Date Time Provider [address]   7/25/2023  2:00 PM [facility]   9/25/2023  3:15 PM Willa Nelson MD [address]

## 2023-07-26 ENCOUNTER — OFFICE VISIT (OUTPATIENT)
Dept: PRIMARY CARE CLINIC | Age: 43
End: 2023-07-26
Payer: COMMERCIAL

## 2023-07-26 VITALS
HEART RATE: 97 BPM | OXYGEN SATURATION: 100 % | BODY MASS INDEX: 39.34 KG/M2 | SYSTOLIC BLOOD PRESSURE: 138 MMHG | TEMPERATURE: 97.3 F | WEIGHT: 222 LBS | DIASTOLIC BLOOD PRESSURE: 88 MMHG | HEIGHT: 63 IN

## 2023-07-26 DIAGNOSIS — B02.29 POST HERPETIC NEURALGIA: ICD-10-CM

## 2023-07-26 DIAGNOSIS — G89.29 OTHER CHRONIC PAIN: ICD-10-CM

## 2023-07-26 DIAGNOSIS — M25.561 ACUTE PAIN OF RIGHT KNEE: Primary | ICD-10-CM

## 2023-07-26 PROCEDURE — 1036F TOBACCO NON-USER: CPT | Performed by: INTERNAL MEDICINE

## 2023-07-26 PROCEDURE — G8417 CALC BMI ABV UP PARAM F/U: HCPCS | Performed by: INTERNAL MEDICINE

## 2023-07-26 PROCEDURE — G8427 DOCREV CUR MEDS BY ELIG CLIN: HCPCS | Performed by: INTERNAL MEDICINE

## 2023-07-26 PROCEDURE — 99213 OFFICE O/P EST LOW 20 MIN: CPT | Performed by: INTERNAL MEDICINE

## 2023-07-26 RX ORDER — GABAPENTIN 600 MG/1
TABLET ORAL
Qty: 75 TABLET | Refills: 2 | Status: SHIPPED | OUTPATIENT
Start: 2023-07-26 | End: 2023-11-24

## 2023-07-26 RX ORDER — ACETAMINOPHEN AND CODEINE PHOSPHATE 300; 30 MG/1; MG/1
1 TABLET ORAL EVERY 8 HOURS PRN
Qty: 21 TABLET | Refills: 0 | Status: SHIPPED | OUTPATIENT
Start: 2023-07-26 | End: 2023-08-02

## 2023-07-26 RX ORDER — METHYLPREDNISOLONE 4 MG/1
TABLET ORAL
Qty: 1 KIT | Refills: 0 | Status: SHIPPED | OUTPATIENT
Start: 2023-07-26 | End: 2023-08-01

## 2023-07-26 ASSESSMENT — ENCOUNTER SYMPTOMS
BLOOD IN STOOL: 0
ABDOMINAL PAIN: 0
SHORTNESS OF BREATH: 0
COUGH: 0
VOMITING: 0
RHINORRHEA: 0
CHEST TIGHTNESS: 0
EYE PAIN: 0
CONSTIPATION: 0
SORE THROAT: 0
NAUSEA: 0
DIARRHEA: 0

## 2023-07-26 NOTE — PROGRESS NOTES
[facility]) Physicians   Internal Medicine     2023  Joss Molina : 1980 Sex: female  Age:42 y.o. Chief Complaint   Patient presents with    Knee Pain     Right knee pain started 2023 from over activity and stairs. Pain is constant per patient, stairs are almost impossible. Pain is described as sharp, shooting and stabbing in nature. Sleeping makes pain better, Tylenol not helping. HPI:   Patient presents to office for evaluation of the following medical concerns. - States having acute worsening right knee pain. States has has seen ortho. States only evaluated left knee and had injection to left knee. Located to medial upper and knee cap pain. States swelling. No erythema. Pain is sharp, burning type pain. States worse with bending, with walking up stairs. States feels as if will buckle. States is afraid to fall. States did fall walking up stairs. States dog ran into her back of her knee. No numbness or tingling. States taking tylenol not helping. States tried Voltaren on left - had bleeding in stool.     - States has started new job    - States back pain. States comes and goes. States gabapentin has helped. States has been having low back pain     - States has been having right knee pain. States has seen ortho. Last visit per reviewed report (2023) - patellofemoral oa, bakers cyst. Was given knee injection, States bakers sycst worsened (2023) US guided bakers cyst aspirination and cortisone injection     - States has vitamin D def. Started on weekly vitamin D x 6 weeks then will take daily. Last endo visit per reviewed note  (3/2023) -hypothyroidism, hyperlipidemia with hypertriglyceridemia, impaired fasting glucose, thyroid nodule. Continue present dose of levothyroxine no further thyroid ultrasound needed. Added vitamin D 50k Units weekly and zetia for cholesterol.     - States had increased heart rate and blood pressure. States has been having diaphoresis.  On

## 2023-08-01 RX ORDER — CYCLOBENZAPRINE HCL 5 MG
5 TABLET ORAL 3 TIMES DAILY PRN
Qty: 60 TABLET | Refills: 0 | Status: SHIPPED | OUTPATIENT
Start: 2023-08-01

## 2023-08-01 NOTE — TELEPHONE ENCOUNTER
Last Appointment:  7/26/2023  Future Appointments   Date Time Provider [address]   9/19/2023  3:15 PM RUTH Diaz - CNP [facility]   9/25/2023  3:15 PM Denny Mack  [facility]

## 2023-08-02 ENCOUNTER — TELEPHONE (OUTPATIENT)
Dept: PRIMARY CARE CLINIC | Age: 43
End: 2023-08-02

## 2023-08-02 NOTE — TELEPHONE ENCOUNTER
Please let the patient know that received notification from [facility]  Pain clinic that they are unable to see the patient due to previous dismissal from the practice from previous treatment as well as do not accept insurance    Would consider referral to [facility] pain management or [facility] pain management    Thanks

## 2023-08-04 NOTE — TELEPHONE ENCOUNTER
We have tried several times to reach [facility], but keep leaving messages, but she does not return the calls.     I did leave a message on her  Garret's voicemail that we need to speak with her regarding a referral.

## 2023-08-04 NOTE — TELEPHONE ENCOUNTER
I tried to reach her  Gris, but had to leave a message that we have tried to reach [facility], but keep leaving messages for a call back. Ciarra Aggarwal

## 2023-08-11 ENCOUNTER — TELEPHONE (OUTPATIENT)
Dept: ORTHOPEDIC SURGERY | Age: 43
End: 2023-08-11

## 2023-08-22 ENCOUNTER — OFFICE VISIT (OUTPATIENT)
Dept: ORTHOPEDIC SURGERY | Age: 43
End: 2023-08-22
Payer: COMMERCIAL

## 2023-08-22 VITALS — HEIGHT: 63 IN | WEIGHT: 222 LBS | BODY MASS INDEX: 39.34 KG/M2

## 2023-08-22 DIAGNOSIS — M17.11 OSTEOARTHRITIS OF RIGHT KNEE, UNSPECIFIED OSTEOARTHRITIS TYPE: Primary | ICD-10-CM

## 2023-08-22 PROCEDURE — 99999 PR OFFICE/OUTPT VISIT,PROCEDURE ONLY: CPT | Performed by: NURSE PRACTITIONER

## 2023-08-22 PROCEDURE — 20610 DRAIN/INJ JOINT/BURSA W/O US: CPT | Performed by: NURSE PRACTITIONER

## 2023-08-22 NOTE — PROGRESS NOTES
[facility]  ORTHOPAEDICS AND SPORTS MEDICINE  DATE OF VISIT: 08/22/23  Follow Up Visit for Visco Injection    CHIEF COMPLAINT:   Chief Complaint   Patient presents with    Knee Pain     Pt is here today for right knee Gelsyn injection #1 of 3         Yazmin Wheeler returns for follow up visit for Gelsyn-3 injection 1. She reports symptoms are not changed   Patient reports left knee pain has improved following corticosteroid injection last month. Physical Exam:   General: Alert and oriented x3, no acute distress  Cardiovascular/pulmonary: No labored breathing, peripheral perfusion intact  Musculoskeletal:    Right knee exam displays good range of motion. Negative swelling or deformity. Stable valgus/varus stress testing. Patella tracks midline. Positive patellofemoral crepitus. Knee was grossly stable on exam.    Imaging: Reviewed     Procedure Note: Knee viscosupplementation injection     The right knee was identified as the injection site. The risk and benefits of injection were explained and the patient consented to the injection. Under sterile conditions, the knee was injected with a full dose of Gelsyn-3. A sterile bandage was applied.     Administrations This Visit       sodium hyaluronate (Viscosup) injection SOSY 16.8 mg       Admin Date  08/22/2023 Action  Given Dose  16.8 mg Route  Intra-artICUlar Administered By  Froy Dasilva CMA                      Assessment/Plan: S/p right knee Gelsyn-3 injection #1 for osteoarthritis  -Continue activity modification/NSAIDS/ICE prn  -f/u next week for second injection      RUTH Arambula-CNP  Orthopedic Surgery   08/22/23  4:11 PM

## 2023-08-26 ENCOUNTER — TELEPHONE (OUTPATIENT)
Dept: PRIMARY CARE CLINIC | Age: 43
End: 2023-08-26

## 2023-08-26 NOTE — TELEPHONE ENCOUNTER
Patient had sent AIRTAMEt message regarding fall and new pain to the knees    In the response had recommended orthopedic walk-in or urgent care    I have not seen any documentation of any visits and wanted to follow-up to see how patient was doing and to see if still needs evaluation    Thanks

## 2023-08-26 NOTE — TELEPHONE ENCOUNTER
----- Message from Komal Arambula sent at 8/23/2023 11:10 AM EDT -----  Regarding: FW: Prescription Question  Contact: [phone]    ----- Message -----  From: Siva Paniagua  Sent: 8/23/2023   6:36 AM EDT  To: [facility] Clinical Staff  Subject: Prescription Question                            Neri Maddox   My timing is absolutely ridiculous. Was just at ortho yesterday and saw michi. Started first round of 3 week gelsyn injections in right knee. Left knee feeling better after steroid injections. Right knee improved some with meds you had gave me. I was out dashing after injection and missed a step and went down hard on left leg, didn't fall. Anyhow, my left knee is killing me very painful to walk. I was wondering if you could please refill the methylpred pack? I still have some T3 tablets. I think I'm gonna need gelsyn injections in left knee too. I greatly appreciate your time and have a good day!   Thanks,  Truly Accomplished

## 2023-08-29 ENCOUNTER — NURSE ONLY (OUTPATIENT)
Dept: ORTHOPEDIC SURGERY | Age: 43
End: 2023-08-29
Payer: COMMERCIAL

## 2023-08-29 DIAGNOSIS — M17.11 OSTEOARTHRITIS OF RIGHT KNEE, UNSPECIFIED OSTEOARTHRITIS TYPE: Primary | ICD-10-CM

## 2023-08-29 PROCEDURE — 20610 DRAIN/INJ JOINT/BURSA W/O US: CPT | Performed by: NURSE PRACTITIONER

## 2023-08-29 NOTE — PROGRESS NOTES
[facility]  ORTHOPAEDICS AND SPORTS MEDICINE  DATE OF VISIT: 08/29/23  Follow Up Visit for Visco Injection    CHIEF COMPLAINT:   Chief Complaint   Patient presents with    Injections     Pt is here today for right knee Gelsyn injection #2 of 3. Claudio Patel returns for follow up visit for Gelsyn-3 injection 2. She reports symptoms are unchanged    Physical Exam:   General: Alert and oriented x3, no acute distress  Cardiovascular/pulmonary: No labored breathing, peripheral perfusion intact  Musculoskeletal:    Right knee exam displays no swelling or deformity. Mild erythema to previous injection site. No sign or symptom infection. Good range of motion displayed. Stable to medicine. Imaging: Reviewed     Procedure Note: Knee viscosupplementation injection     The right knee was identified as the injection site. The risk and benefits of injection were explained and the patient consented to the injection. Under sterile conditions, the knee was injected with a full dose of Gelsyn-3. A sterile bandage was applied.     Administrations This Visit       sodium hyaluronate (Viscosup) injection SOSY 16.8 mg       Admin Date  08/29/2023 Action  Given Dose  16.8 mg Route  Intra-artICUlar Administered By  Margarita Cintron CMA                      Assessment/Plan: S/p right knee Gelsyn-3 injection #2 for osteoarthritis  -Continue activity modification/NSAIDS/ICE prn  -f/u next week for final injection      RUTH Ingram-CNP  Orthopedic Surgery   08/29/23  5:58 PM

## 2023-09-01 RX ORDER — CYCLOBENZAPRINE HCL 5 MG
5 TABLET ORAL 3 TIMES DAILY PRN
Qty: 60 TABLET | Refills: 0 | Status: SHIPPED | OUTPATIENT
Start: 2023-09-01

## 2023-09-07 ENCOUNTER — NURSE ONLY (OUTPATIENT)
Dept: ORTHOPEDIC SURGERY | Age: 43
End: 2023-09-07

## 2023-09-07 VITALS — WEIGHT: 222 LBS | HEIGHT: 63 IN | BODY MASS INDEX: 39.34 KG/M2

## 2023-09-07 DIAGNOSIS — M17.11 OSTEOARTHRITIS OF RIGHT KNEE, UNSPECIFIED OSTEOARTHRITIS TYPE: Primary | ICD-10-CM

## 2023-09-07 NOTE — PROGRESS NOTES
[facility]  ORTHOPAEDICS AND SPORTS MEDICINE  DATE OF VISIT: 09/07/23  Follow Up Visit for Visco Injection    CHIEF COMPLAINT:   Chief Complaint   Patient presents with    Injections     Pt here for injection 3 of 3 Gelsyn. Pain level is 2 -3 out of 10. Pt states injections are helping. Waldemar Soto returns for follow up visit for right knee Gelsyn-3 injection 3. She reports symptoms are improved    Physical Exam:   General: Alert and oriented x3, no acute distress  Cardiovascular/pulmonary: No labored breathing, peripheral perfusion intact  Musculoskeletal:    Right knee exam displays full motion. Negative swelling deformity. Stable ligamentous testing. Imaging: Reviewed     Procedure Note: Knee viscosupplementation injection     The right knee was identified as the injection site. The risk and benefits of injection were explained and the patient consented to the injection. Under sterile conditions, the knee was injected with a full dose of Gelsyn-3. A sterile bandage was applied.     Administrations This Visit       sodium hyaluronate (Viscosup) injection SOSY 16.8 mg       Admin Date  09/07/2023 Action  Given Dose  16.8 mg Route  Intra-artICUlar Administered By  Lisbet Gracia RN                      Assessment/Plan: S/p right knee Gelsyn-3 injection #3 for osteoarthritis  -Continue activity modification/NSAIDS/ICE prn  -f/u 3 months      RUTH Muro-CNP  Orthopedic Surgery   09/07/23  5:36 PM

## 2023-09-25 ENCOUNTER — OFFICE VISIT (OUTPATIENT)
Dept: FAMILY MEDICINE CLINIC | Age: 43
End: 2023-09-25
Payer: COMMERCIAL

## 2023-09-25 VITALS
RESPIRATION RATE: 20 BRPM | HEART RATE: 94 BPM | WEIGHT: 223 LBS | DIASTOLIC BLOOD PRESSURE: 76 MMHG | BODY MASS INDEX: 39.51 KG/M2 | SYSTOLIC BLOOD PRESSURE: 110 MMHG | OXYGEN SATURATION: 98 % | TEMPERATURE: 98 F | HEIGHT: 63 IN

## 2023-09-25 DIAGNOSIS — B02.29 POST HERPETIC NEURALGIA: ICD-10-CM

## 2023-09-25 DIAGNOSIS — Z79.899 LONG TERM CURRENT USE OF THERAPEUTIC DRUG: ICD-10-CM

## 2023-09-25 DIAGNOSIS — K21.9 GASTROESOPHAGEAL REFLUX DISEASE WITHOUT ESOPHAGITIS: ICD-10-CM

## 2023-09-25 DIAGNOSIS — Z12.31 ENCOUNTER FOR SCREENING MAMMOGRAM FOR MALIGNANT NEOPLASM OF BREAST: ICD-10-CM

## 2023-09-25 DIAGNOSIS — R73.01 IMPAIRED FASTING GLUCOSE: ICD-10-CM

## 2023-09-25 DIAGNOSIS — E55.9 VITAMIN D DEFICIENCY: ICD-10-CM

## 2023-09-25 DIAGNOSIS — K52.9 COLITIS: ICD-10-CM

## 2023-09-25 DIAGNOSIS — E05.90 HYPERTHYROIDISM: ICD-10-CM

## 2023-09-25 DIAGNOSIS — F33.1 MODERATE EPISODE OF RECURRENT MAJOR DEPRESSIVE DISORDER (HCC): ICD-10-CM

## 2023-09-25 DIAGNOSIS — J30.9 CHRONIC ALLERGIC RHINITIS: ICD-10-CM

## 2023-09-25 DIAGNOSIS — K22.70 BARRETT'S ESOPHAGUS WITHOUT DYSPLASIA: ICD-10-CM

## 2023-09-25 DIAGNOSIS — M15.9 PRIMARY OSTEOARTHRITIS INVOLVING MULTIPLE JOINTS: ICD-10-CM

## 2023-09-25 DIAGNOSIS — K22.4 ESOPHAGEAL SPASM: ICD-10-CM

## 2023-09-25 DIAGNOSIS — R00.0 TACHYCARDIA: ICD-10-CM

## 2023-09-25 DIAGNOSIS — E78.2 MIXED HYPERLIPIDEMIA: ICD-10-CM

## 2023-09-25 DIAGNOSIS — F41.1 GENERALIZED ANXIETY DISORDER: Primary | ICD-10-CM

## 2023-09-25 DIAGNOSIS — Z86.19 HISTORY OF SHINGLES: ICD-10-CM

## 2023-09-25 LAB
T3 FREE: 3.22 PG/ML (ref 2–4.4)
T4 FREE: 1.3 NG/DL (ref 0.9–1.7)
TSH SERPL DL<=0.05 MIU/L-ACNC: 0.24 UIU/ML (ref 0.27–4.2)

## 2023-09-25 PROCEDURE — G8417 CALC BMI ABV UP PARAM F/U: HCPCS | Performed by: INTERNAL MEDICINE

## 2023-09-25 PROCEDURE — G8427 DOCREV CUR MEDS BY ELIG CLIN: HCPCS | Performed by: INTERNAL MEDICINE

## 2023-09-25 PROCEDURE — 1036F TOBACCO NON-USER: CPT | Performed by: INTERNAL MEDICINE

## 2023-09-25 PROCEDURE — 99214 OFFICE O/P EST MOD 30 MIN: CPT | Performed by: INTERNAL MEDICINE

## 2023-09-25 RX ORDER — VALACYCLOVIR HYDROCHLORIDE 1 G/1
1000 TABLET, FILM COATED ORAL DAILY
Qty: 30 TABLET | Refills: 5 | Status: SHIPPED | OUTPATIENT
Start: 2023-09-25

## 2023-09-25 RX ORDER — ALBUTEROL SULFATE 90 UG/1
2 AEROSOL, METERED RESPIRATORY (INHALATION) EVERY 4 HOURS PRN
Qty: 1 EACH | Refills: 5 | Status: SHIPPED | OUTPATIENT
Start: 2023-09-25

## 2023-09-25 RX ORDER — PROMETHAZINE HYDROCHLORIDE 25 MG/1
25 TABLET ORAL EVERY 8 HOURS PRN
Qty: 40 TABLET | Refills: 5 | Status: SHIPPED | OUTPATIENT
Start: 2023-09-25

## 2023-09-25 RX ORDER — MONTELUKAST SODIUM 10 MG/1
10 TABLET ORAL NIGHTLY
Qty: 30 TABLET | Refills: 5 | Status: SHIPPED | OUTPATIENT
Start: 2023-09-25

## 2023-09-25 RX ORDER — ATENOLOL 50 MG/1
50 TABLET ORAL 2 TIMES DAILY
Qty: 60 TABLET | Refills: 5 | Status: SHIPPED | OUTPATIENT
Start: 2023-09-25

## 2023-09-25 RX ORDER — FLUTICASONE PROPIONATE 50 MCG
2 SPRAY, SUSPENSION (ML) NASAL DAILY
Qty: 1 EACH | Refills: 5 | Status: SHIPPED | OUTPATIENT
Start: 2023-09-25

## 2023-09-25 RX ORDER — GABAPENTIN 600 MG/1
TABLET ORAL
Qty: 75 TABLET | Refills: 5 | Status: SHIPPED | OUTPATIENT
Start: 2023-09-25 | End: 2024-01-24

## 2023-09-25 RX ORDER — OMEPRAZOLE 40 MG/1
40 CAPSULE, DELAYED RELEASE ORAL DAILY
Qty: 30 CAPSULE | Refills: 5 | Status: SHIPPED | OUTPATIENT
Start: 2023-09-25

## 2023-09-25 RX ORDER — LORATADINE 10 MG/1
10 TABLET ORAL DAILY PRN
Qty: 30 TABLET | Refills: 5 | Status: SHIPPED | OUTPATIENT
Start: 2023-09-25

## 2023-09-25 RX ORDER — ALPRAZOLAM 1 MG/1
1 TABLET ORAL 2 TIMES DAILY
Qty: 60 TABLET | Refills: 2 | Status: SHIPPED | OUTPATIENT
Start: 2023-09-25 | End: 2023-12-24

## 2023-09-25 RX ORDER — DULOXETIN HYDROCHLORIDE 60 MG/1
60 CAPSULE, DELAYED RELEASE ORAL DAILY
Qty: 30 CAPSULE | Refills: 5 | Status: SHIPPED | OUTPATIENT
Start: 2023-09-25

## 2023-09-25 RX ORDER — CYCLOBENZAPRINE HCL 5 MG
5 TABLET ORAL 3 TIMES DAILY PRN
Qty: 60 TABLET | Refills: 5 | Status: SHIPPED | OUTPATIENT
Start: 2023-09-25

## 2023-09-25 ASSESSMENT — ENCOUNTER SYMPTOMS
SORE THROAT: 0
ABDOMINAL PAIN: 0
RHINORRHEA: 0
EYE PAIN: 0
COUGH: 0
DIARRHEA: 0
SHORTNESS OF BREATH: 0
NAUSEA: 0
BLOOD IN STOOL: 0
VOMITING: 0
CONSTIPATION: 0
CHEST TIGHTNESS: 0

## 2023-09-25 NOTE — PROGRESS NOTES
inhaler 1 each 5     Sig: Inhale 2 puffs into the lungs every 4 hours as needed for Wheezing    gabapentin (NEURONTIN) 600 MG tablet 75 tablet 5     Si tab 2-3 times qd       Evangelist Bautista MD  2023  4:09 PM

## 2023-09-26 ENCOUNTER — TELEPHONE (OUTPATIENT)
Dept: FAMILY MEDICINE CLINIC | Age: 43
End: 2023-09-26

## 2023-09-26 NOTE — TELEPHONE ENCOUNTER
Please let the patient know that blood work results showed    TSH or precursor to thyroid was in borderline to overactive    Free T4 and free T3 is active thyroid hormones were in normal range    Could Consider dose adjustment with levothyroxine with a slight decrease or could continue the same and recheck at next office visit given that it was just borderline to overactive    If we were to make a dose adjustment would recommend continuing the 75 mcg daily Monday through Saturday and only half a tablet on Sunday    Thanks

## 2023-09-27 NOTE — TELEPHONE ENCOUNTER
Patient notified and verbalized understanding. She would like to just stay on the same dose and recheck it next time.

## 2023-10-04 ENCOUNTER — TELEPHONE (OUTPATIENT)
Dept: PRIMARY CARE CLINIC | Age: 43
End: 2023-10-04

## 2023-10-04 NOTE — TELEPHONE ENCOUNTER
Please let the patient know that mammogram per radiology report was considered negative    Radiology noted scattered fibroglandular densities that appeared benign    No suspicious masses lesions or other calcifications    Recommending follow-up in 1 year or sooner evaluation if any new changes new concerns    Thanks

## 2023-11-06 RX ORDER — LEVONORGESTREL AND ETHINYL ESTRADIOL 100-20(84)
KIT ORAL
Qty: 1 PACKET | Refills: 3 | Status: SHIPPED | OUTPATIENT
Start: 2023-11-06

## 2023-11-06 RX ORDER — LEVONORGESTREL AND ETHINYL ESTRADIOL 100-20(84)
KIT ORAL
Qty: 1 PACKET | Refills: 0 | OUTPATIENT
Start: 2023-11-06

## 2023-11-06 NOTE — TELEPHONE ENCOUNTER
Last Appointment:  9/25/2023  Future Appointments   Date Time Provider [address]   12/7/2023  3:00 PM RUTH Aquino - CNP [facility]   12/28/2023  1:00 PM Peng Edmonds  [facility]

## 2023-12-26 DIAGNOSIS — F41.1 GENERALIZED ANXIETY DISORDER: ICD-10-CM

## 2023-12-26 DIAGNOSIS — F33.1 MODERATE EPISODE OF RECURRENT MAJOR DEPRESSIVE DISORDER (HCC): ICD-10-CM

## 2023-12-26 RX ORDER — ALPRAZOLAM 1 MG/1
1 TABLET ORAL 2 TIMES DAILY
Qty: 60 TABLET | Refills: 0 | Status: SHIPPED | OUTPATIENT
Start: 2023-12-26 | End: 2024-01-25

## 2023-12-26 NOTE — TELEPHONE ENCOUNTER
Requested Prescriptions     Signed Prescriptions Disp Refills    ALPRAZolam (XANAX) 1 MG tablet 60 tablet 0     Sig: Take 1 tablet by mouth 2 times daily for 30 days. Max Daily Amount: 2 mg     Authorizing Provider: Yudi Medina     Controlled Substance Monitoring:    Acute and Chronic Pain Monitoring:   RX Monitoring Periodic Controlled Substance Monitoring   12/26/2023   4:27 PM No signs of potential drug abuse or diversion identified. I sent the Rx to the pharmacy.

## 2023-12-26 NOTE — TELEPHONE ENCOUNTER
Last Appointment:  9/25/2023  Future Appointments   Date Time Provider [address]   2/5/2024  1:00 PM Louisa Bermeo  [facility]

## 2024-01-25 DIAGNOSIS — E78.2 MIXED HYPERLIPIDEMIA: ICD-10-CM

## 2024-01-25 DIAGNOSIS — R73.01 IMPAIRED FASTING GLUCOSE: ICD-10-CM

## 2024-01-25 DIAGNOSIS — E05.90 HYPERTHYROIDISM: ICD-10-CM

## 2024-01-25 DIAGNOSIS — E55.9 VITAMIN D DEFICIENCY: ICD-10-CM

## 2024-01-25 DIAGNOSIS — Z79.899 LONG TERM CURRENT USE OF THERAPEUTIC DRUG: ICD-10-CM

## 2024-01-25 LAB
ABSOLUTE IMMATURE GRANULOCYTE: 0.04 K/UL (ref 0–0.58)
ALBUMIN SERPL-MCNC: 3.8 G/DL (ref 3.5–5.2)
ALP BLD-CCNC: 66 U/L (ref 35–104)
ALT SERPL-CCNC: 13 U/L (ref 0–32)
ANION GAP SERPL CALCULATED.3IONS-SCNC: 16 MMOL/L (ref 7–16)
AST SERPL-CCNC: 20 U/L (ref 0–31)
BACTERIA: ABNORMAL
BASOPHILS ABSOLUTE: 0.06 K/UL (ref 0–0.2)
BASOPHILS RELATIVE PERCENT: 1 % (ref 0–2)
BILIRUB SERPL-MCNC: 0.3 MG/DL (ref 0–1.2)
BILIRUBIN URINE: NEGATIVE
BUN BLDV-MCNC: 6 MG/DL (ref 6–20)
CALCIUM SERPL-MCNC: 8.8 MG/DL (ref 8.6–10.2)
CHLORIDE BLD-SCNC: 102 MMOL/L (ref 98–107)
CHOLESTEROL, FASTING: 218 MG/DL
CO2: 19 MMOL/L (ref 22–29)
COLOR: YELLOW
CREAT SERPL-MCNC: 0.9 MG/DL (ref 0.5–1)
CREATININE URINE: 322 MG/DL (ref 29–226)
EOSINOPHILS ABSOLUTE: 0.22 K/UL (ref 0.05–0.5)
EOSINOPHILS RELATIVE PERCENT: 2 % (ref 0–6)
EPITHELIAL CELLS UA: ABNORMAL /HPF
FOLATE: 9.3 NG/ML (ref 4.8–24.2)
GFR SERPL CREATININE-BSD FRML MDRD: >60 ML/MIN/1.73M2
GLUCOSE BLD-MCNC: 167 MG/DL (ref 74–99)
GLUCOSE URINE: NEGATIVE MG/DL
HBA1C MFR BLD: 5.4 % (ref 4–5.6)
HCT VFR BLD CALC: 43.6 % (ref 34–48)
HDLC SERPL-MCNC: 37 MG/DL
HEMOGLOBIN: 13.4 G/DL (ref 11.5–15.5)
IMMATURE GRANULOCYTES: 0 % (ref 0–5)
KETONES, URINE: NEGATIVE MG/DL
LDL CHOLESTEROL: 133 MG/DL
LEUKOCYTE ESTERASE, URINE: NEGATIVE
LYMPHOCYTES ABSOLUTE: 5.08 K/UL (ref 1.5–4)
LYMPHOCYTES RELATIVE PERCENT: 39 % (ref 20–42)
MAGNESIUM: 2.2 MG/DL (ref 1.6–2.6)
MCH RBC QN AUTO: 32.9 PG (ref 26–35)
MCHC RBC AUTO-ENTMCNC: 30.7 G/DL (ref 32–34.5)
MCV RBC AUTO: 107.1 FL (ref 80–99.9)
MICROALBUMIN/CREAT 24H UR: 21 MG/L (ref 0–19)
MICROALBUMIN/CREAT UR-RTO: 7 MCG/MG CREAT (ref 0–30)
MONOCYTES ABSOLUTE: 0.52 K/UL (ref 0.1–0.95)
MONOCYTES RELATIVE PERCENT: 4 % (ref 2–12)
NEUTROPHILS ABSOLUTE: 7.25 K/UL (ref 1.8–7.3)
NEUTROPHILS RELATIVE PERCENT: 55 % (ref 43–80)
NITRITE, URINE: NEGATIVE
PDW BLD-RTO: 13.1 % (ref 11.5–15)
PH UA: 6.5 (ref 5–9)
PLATELET # BLD: 434 K/UL (ref 130–450)
PMV BLD AUTO: 9.9 FL (ref 7–12)
POTASSIUM SERPL-SCNC: 4.5 MMOL/L (ref 3.5–5)
PROTEIN UA: ABNORMAL MG/DL
RBC # BLD: 4.07 M/UL (ref 3.5–5.5)
RBC UA: ABNORMAL /HPF
SODIUM BLD-SCNC: 137 MMOL/L (ref 132–146)
SPECIFIC GRAVITY UA: 1.02 (ref 1–1.03)
T3 FREE: 2.96 PG/ML (ref 2–4.4)
T4 FREE: 1.1 NG/DL (ref 0.9–1.7)
TOTAL PROTEIN: 6.8 G/DL (ref 6.4–8.3)
TRIGLYCERIDE, FASTING: 239 MG/DL
TSH SERPL DL<=0.05 MIU/L-ACNC: 2.14 UIU/ML (ref 0.27–4.2)
TURBIDITY: CLEAR
URINE HGB: NEGATIVE
UROBILINOGEN, URINE: 0.2 EU/DL (ref 0–1)
VITAMIN B-12: 731 PG/ML (ref 211–946)
VITAMIN D 25-HYDROXY: 14.7 NG/ML (ref 30–100)
VLDLC SERPL CALC-MCNC: 48 MG/DL
WBC # BLD: 13.2 K/UL (ref 4.5–11.5)
WBC UA: ABNORMAL /HPF

## 2024-01-26 DIAGNOSIS — F41.1 GENERALIZED ANXIETY DISORDER: ICD-10-CM

## 2024-01-26 DIAGNOSIS — F33.1 MODERATE EPISODE OF RECURRENT MAJOR DEPRESSIVE DISORDER (HCC): ICD-10-CM

## 2024-01-26 RX ORDER — ALPRAZOLAM 1 MG/1
1 TABLET ORAL 2 TIMES DAILY
Qty: 60 TABLET | Refills: 0 | Status: SHIPPED | OUTPATIENT
Start: 2024-01-26 | End: 2024-02-25

## 2024-01-26 NOTE — TELEPHONE ENCOUNTER
Last Appointment:  9/25/2023  Future Appointments   Date Time Provider Department Center   2/5/2024  1:00 PM Caleb Crook MD [facility]

## 2024-01-27 ENCOUNTER — TELEPHONE (OUTPATIENT)
Dept: FAMILY MEDICINE CLINIC | Age: 44
End: 2024-01-27

## 2024-01-28 NOTE — TELEPHONE ENCOUNTER
Please let the patient know that blood work results showed    Cholesterol levels were still elevated.  HDL or good cholesterol was lower than recommended.   The 10-year ASCVD risk score (Fran WEBB, et al., 2019) is: 1.3%, which is considered lower risk for heart and vascular complications in the next 10 years.  Recommend low-fat Mediterranean type diet    Bicarbonate level was borderline decreased.  Uncertain as to the exact cause but could be related to medication and/or dehydration    Vitamin D was low and recommend supplementation    Urine analysis showed slight microscopic protein as well as slight overt protein bacteria and skin cells uncertain if sample had some contamination    White blood cell count was borderline elevated associated with increase in the overall lymphocyte counts.     Red blood cell count's were normal but the size of the red blood cells were enlarged    Vitamin B12 and folic acid levels were normal    Thyroid function test including precursor TSH and active thyroid hormone levels T4 and T3 were normal    Sugar was elevated.  Hemoglobin A1c is a measure 3-month sugar control was improved and back in normal range at 5.4.     Other electrolytes including magnesium, liver functions, and kidney function values were normal    Thanks

## 2024-02-27 DIAGNOSIS — F41.1 GENERALIZED ANXIETY DISORDER: ICD-10-CM

## 2024-02-27 DIAGNOSIS — F33.1 MODERATE EPISODE OF RECURRENT MAJOR DEPRESSIVE DISORDER (HCC): ICD-10-CM

## 2024-02-27 RX ORDER — ALPRAZOLAM 1 MG/1
1 TABLET ORAL 2 TIMES DAILY
Qty: 60 TABLET | Refills: 0 | Status: SHIPPED | OUTPATIENT
Start: 2024-02-27 | End: 2024-03-28

## 2024-02-27 NOTE — TELEPHONE ENCOUNTER
Last Appointment:  9/25/2023  Future Appointments   Date Time Provider Department Center   3/13/2024  3:15 PM Caleb Crook MD [facility]

## 2024-02-27 NOTE — TELEPHONE ENCOUNTER
Controlled Substance Monitoring:    Acute and Chronic Pain Monitoring:   RX Monitoring Periodic Controlled Substance Monitoring   2/27/2024   2:37 PM No signs of potential drug abuse or diversion identified.       Requested Prescriptions     Pending Prescriptions Disp Refills    ALPRAZolam (XANAX) 1 MG tablet 60 tablet 0     Sig: Take 1 tablet by mouth 2 times daily for 30 days. Max Daily Amount: 2 mg     I sent the Rx to the pharmacy.

## 2024-04-03 ENCOUNTER — OFFICE VISIT (OUTPATIENT)
Dept: PRIMARY CARE CLINIC | Age: 44
End: 2024-04-03
Payer: COMMERCIAL

## 2024-04-03 VITALS
BODY MASS INDEX: 39.51 KG/M2 | HEART RATE: 93 BPM | SYSTOLIC BLOOD PRESSURE: 110 MMHG | DIASTOLIC BLOOD PRESSURE: 78 MMHG | WEIGHT: 223 LBS | HEIGHT: 63 IN | OXYGEN SATURATION: 98 % | TEMPERATURE: 97.7 F

## 2024-04-03 DIAGNOSIS — K52.9 COLITIS: ICD-10-CM

## 2024-04-03 DIAGNOSIS — B02.29 POST HERPETIC NEURALGIA: ICD-10-CM

## 2024-04-03 DIAGNOSIS — R73.01 IMPAIRED FASTING GLUCOSE: ICD-10-CM

## 2024-04-03 DIAGNOSIS — F41.1 GENERALIZED ANXIETY DISORDER: ICD-10-CM

## 2024-04-03 DIAGNOSIS — F33.1 MODERATE EPISODE OF RECURRENT MAJOR DEPRESSIVE DISORDER (HCC): ICD-10-CM

## 2024-04-03 DIAGNOSIS — Z86.19 HISTORY OF SHINGLES: Primary | ICD-10-CM

## 2024-04-03 DIAGNOSIS — E05.90 HYPERTHYROIDISM: ICD-10-CM

## 2024-04-03 DIAGNOSIS — K22.70 BARRETT'S ESOPHAGUS WITHOUT DYSPLASIA: ICD-10-CM

## 2024-04-03 DIAGNOSIS — E78.2 MIXED HYPERLIPIDEMIA: ICD-10-CM

## 2024-04-03 DIAGNOSIS — J30.9 CHRONIC ALLERGIC RHINITIS: ICD-10-CM

## 2024-04-03 DIAGNOSIS — M15.9 PRIMARY OSTEOARTHRITIS INVOLVING MULTIPLE JOINTS: ICD-10-CM

## 2024-04-03 DIAGNOSIS — J45.20 MILD INTERMITTENT ASTHMA WITHOUT COMPLICATION: ICD-10-CM

## 2024-04-03 DIAGNOSIS — K21.9 GASTROESOPHAGEAL REFLUX DISEASE WITHOUT ESOPHAGITIS: ICD-10-CM

## 2024-04-03 DIAGNOSIS — K22.4 ESOPHAGEAL SPASM: ICD-10-CM

## 2024-04-03 DIAGNOSIS — R00.0 TACHYCARDIA: ICD-10-CM

## 2024-04-03 PROCEDURE — G8417 CALC BMI ABV UP PARAM F/U: HCPCS | Performed by: INTERNAL MEDICINE

## 2024-04-03 PROCEDURE — 99214 OFFICE O/P EST MOD 30 MIN: CPT | Performed by: INTERNAL MEDICINE

## 2024-04-03 PROCEDURE — G8427 DOCREV CUR MEDS BY ELIG CLIN: HCPCS | Performed by: INTERNAL MEDICINE

## 2024-04-03 PROCEDURE — 1036F TOBACCO NON-USER: CPT | Performed by: INTERNAL MEDICINE

## 2024-04-03 RX ORDER — LORATADINE 10 MG/1
10 TABLET ORAL DAILY PRN
Qty: 30 TABLET | Refills: 5 | Status: SHIPPED | OUTPATIENT
Start: 2024-04-03

## 2024-04-03 RX ORDER — ALPRAZOLAM 1 MG/1
1 TABLET ORAL 2 TIMES DAILY
Qty: 60 TABLET | Refills: 0 | OUTPATIENT
Start: 2024-04-03 | End: 2024-05-03

## 2024-04-03 RX ORDER — FLUTICASONE PROPIONATE 50 MCG
2 SPRAY, SUSPENSION (ML) NASAL DAILY
Qty: 1 EACH | Refills: 5 | Status: SHIPPED | OUTPATIENT
Start: 2024-04-03

## 2024-04-03 RX ORDER — DULOXETIN HYDROCHLORIDE 60 MG/1
60 CAPSULE, DELAYED RELEASE ORAL DAILY
Qty: 30 CAPSULE | Refills: 5 | Status: SHIPPED | OUTPATIENT
Start: 2024-04-03

## 2024-04-03 RX ORDER — MONTELUKAST SODIUM 10 MG/1
10 TABLET ORAL NIGHTLY
Qty: 30 TABLET | Refills: 5 | Status: SHIPPED | OUTPATIENT
Start: 2024-04-03

## 2024-04-03 RX ORDER — ALPRAZOLAM 1 MG/1
1 TABLET ORAL 2 TIMES DAILY
Qty: 60 TABLET | Refills: 0 | Status: SHIPPED | OUTPATIENT
Start: 2024-04-03 | End: 2024-05-03

## 2024-04-03 RX ORDER — OMEPRAZOLE 40 MG/1
40 CAPSULE, DELAYED RELEASE ORAL DAILY
Qty: 30 CAPSULE | Refills: 5 | Status: SHIPPED | OUTPATIENT
Start: 2024-04-03

## 2024-04-03 RX ORDER — GABAPENTIN 600 MG/1
TABLET ORAL
Qty: 75 TABLET | Refills: 5 | Status: SHIPPED | OUTPATIENT
Start: 2024-04-03 | End: 2024-10-11

## 2024-04-03 RX ORDER — CYCLOBENZAPRINE HCL 5 MG
5 TABLET ORAL 3 TIMES DAILY PRN
Qty: 60 TABLET | Refills: 5 | Status: SHIPPED | OUTPATIENT
Start: 2024-04-03

## 2024-04-03 RX ORDER — ERGOCALCIFEROL 1.25 MG/1
50000 CAPSULE ORAL WEEKLY
Qty: 4 CAPSULE | Refills: 5 | Status: SHIPPED | OUTPATIENT
Start: 2024-04-03

## 2024-04-03 RX ORDER — BUPROPION HYDROCHLORIDE 100 MG/1
100 TABLET, EXTENDED RELEASE ORAL DAILY
Qty: 30 TABLET | Refills: 5 | Status: SHIPPED | OUTPATIENT
Start: 2024-04-03

## 2024-04-03 SDOH — ECONOMIC STABILITY: HOUSING INSECURITY
IN THE LAST 12 MONTHS, WAS THERE A TIME WHEN YOU DID NOT HAVE A STEADY PLACE TO SLEEP OR SLEPT IN A SHELTER (INCLUDING NOW)?: NO

## 2024-04-03 SDOH — ECONOMIC STABILITY: FOOD INSECURITY: WITHIN THE PAST 12 MONTHS, YOU WORRIED THAT YOUR FOOD WOULD RUN OUT BEFORE YOU GOT MONEY TO BUY MORE.: SOMETIMES TRUE

## 2024-04-03 SDOH — ECONOMIC STABILITY: INCOME INSECURITY: HOW HARD IS IT FOR YOU TO PAY FOR THE VERY BASICS LIKE FOOD, HOUSING, MEDICAL CARE, AND HEATING?: NOT VERY HARD

## 2024-04-03 SDOH — ECONOMIC STABILITY: FOOD INSECURITY: WITHIN THE PAST 12 MONTHS, THE FOOD YOU BOUGHT JUST DIDN'T LAST AND YOU DIDN'T HAVE MONEY TO GET MORE.: SOMETIMES TRUE

## 2024-04-03 SDOH — ECONOMIC STABILITY: TRANSPORTATION INSECURITY
IN THE PAST 12 MONTHS, HAS LACK OF TRANSPORTATION KEPT YOU FROM MEETINGS, WORK, OR FROM GETTING THINGS NEEDED FOR DAILY LIVING?: YES

## 2024-04-03 ASSESSMENT — ENCOUNTER SYMPTOMS
COUGH: 0
VOMITING: 0
SORE THROAT: 0
DIARRHEA: 0
CONSTIPATION: 0
NAUSEA: 0
SHORTNESS OF BREATH: 0
BLOOD IN STOOL: 0
ABDOMINAL PAIN: 0
CHEST TIGHTNESS: 0

## 2024-04-03 NOTE — TELEPHONE ENCOUNTER
Last Appointment:  4/3/2024  Future Appointments   Date Time Provider Department Center   5/8/2024 11:30 AM Caleb Crook MD [facility]   7/10/2024  1:00 PM Caleb Crook MD [facility]

## 2024-04-03 NOTE — PROGRESS NOTES
patient.     Return in about 1 month (around 5/3/2024) for check up and review.    No orders of the defined types were placed in this encounter.    Requested Prescriptions     Signed Prescriptions Disp Refills    vitamin D (ERGOCALCIFEROL) 1.25 MG (10102 UT) CAPS capsule 4 capsule 5     Sig: Take 1 capsule by mouth once a week    buPROPion (WELLBUTRIN SR) 100 MG extended release tablet 30 tablet 5     Sig: Take 1 tablet by mouth daily    ALPRAZolam (XANAX) 1 MG tablet 60 tablet 0     Sig: Take 1 tablet by mouth 2 times daily for 30 days. Max Daily Amount: 2 mg    cyclobenzaprine (FLEXERIL) 5 MG tablet 60 tablet 5     Sig: Take 1 tablet by mouth 3 times daily as needed for Muscle spasms    DULoxetine (CYMBALTA) 60 MG extended release capsule 30 capsule 5     Sig: Take 1 capsule by mouth daily    fluticasone (FLONASE) 50 MCG/ACT nasal spray 1 each 5     Si sprays by Nasal route daily    gabapentin (NEURONTIN) 600 MG tablet 75 tablet 5     Si tab 2-3 times qd    loratadine (CLARITIN) 10 MG tablet 30 tablet 5     Sig: Take 1 tablet by mouth daily as needed (allergies)    montelukast (SINGULAIR) 10 MG tablet 30 tablet 5     Sig: Take 1 tablet by mouth nightly    omeprazole (PRILOSEC) 40 MG delayed release capsule 30 capsule 5     Sig: Take 1 capsule by mouth daily       Caleb Crook MD  4/3/2024  12:03 PM

## 2024-05-06 DIAGNOSIS — F33.1 MODERATE EPISODE OF RECURRENT MAJOR DEPRESSIVE DISORDER (HCC): ICD-10-CM

## 2024-05-06 DIAGNOSIS — F41.1 GENERALIZED ANXIETY DISORDER: ICD-10-CM

## 2024-05-08 RX ORDER — ALPRAZOLAM 1 MG/1
1 TABLET ORAL 2 TIMES DAILY
Qty: 60 TABLET | Refills: 0 | OUTPATIENT
Start: 2024-05-08 | End: 2024-06-07

## 2024-05-31 DIAGNOSIS — K21.9 GASTROESOPHAGEAL REFLUX DISEASE WITHOUT ESOPHAGITIS: ICD-10-CM

## 2024-05-31 DIAGNOSIS — K22.70 BARRETT'S ESOPHAGUS WITHOUT DYSPLASIA: ICD-10-CM

## 2024-05-31 NOTE — TELEPHONE ENCOUNTER
Last Appointment:  4/3/2024  Future Appointments   Date Time Provider Department Center   7/10/2024  1:00 PM Caleb Crook MD [facility]

## 2024-06-01 RX ORDER — PROMETHAZINE HYDROCHLORIDE 25 MG/1
25 TABLET ORAL EVERY 8 HOURS PRN
Qty: 40 TABLET | Refills: 0 | Status: SHIPPED | OUTPATIENT
Start: 2024-06-01

## 2024-06-14 DIAGNOSIS — F33.1 MODERATE EPISODE OF RECURRENT MAJOR DEPRESSIVE DISORDER (HCC): ICD-10-CM

## 2024-06-14 DIAGNOSIS — F41.1 GENERALIZED ANXIETY DISORDER: ICD-10-CM

## 2024-06-15 RX ORDER — ALPRAZOLAM 1 MG/1
1 TABLET ORAL 2 TIMES DAILY
Qty: 60 TABLET | Refills: 0 | Status: SHIPPED | OUTPATIENT
Start: 2024-06-15 | End: 2024-07-15

## 2024-06-15 NOTE — TELEPHONE ENCOUNTER
Controlled Substance Monitoring:    Acute and Chronic Pain Monitoring:   RX Monitoring Periodic Controlled Substance Monitoring   6/15/2024   1:55 PM No signs of potential drug abuse or diversion identified.       Requested Prescriptions     Pending Prescriptions Disp Refills    ALPRAZolam (XANAX) 1 MG tablet 60 tablet 0     Sig: Take 1 tablet by mouth 2 times daily for 30 days. Max Daily Amount: 2 mg     I sent the Rx to the pharmacy.

## 2024-07-03 DIAGNOSIS — K22.70 BARRETT'S ESOPHAGUS WITHOUT DYSPLASIA: ICD-10-CM

## 2024-07-03 DIAGNOSIS — K21.9 GASTROESOPHAGEAL REFLUX DISEASE WITHOUT ESOPHAGITIS: ICD-10-CM

## 2024-07-03 RX ORDER — PROMETHAZINE HYDROCHLORIDE 25 MG/1
25 TABLET ORAL EVERY 8 HOURS PRN
Qty: 40 TABLET | Refills: 0 | Status: SHIPPED | OUTPATIENT
Start: 2024-07-03

## 2024-07-03 NOTE — TELEPHONE ENCOUNTER
Last Appointment:  4/3/2024  Future Appointments   Date Time Provider Department Center   7/10/2024  1:00 PM Caleb Crook MD [facility]

## 2024-07-15 DIAGNOSIS — M15.9 PRIMARY OSTEOARTHRITIS INVOLVING MULTIPLE JOINTS: ICD-10-CM

## 2024-07-15 DIAGNOSIS — F33.1 MODERATE EPISODE OF RECURRENT MAJOR DEPRESSIVE DISORDER (HCC): ICD-10-CM

## 2024-07-15 DIAGNOSIS — F41.1 GENERALIZED ANXIETY DISORDER: ICD-10-CM

## 2024-07-15 RX ORDER — DULOXETIN HYDROCHLORIDE 60 MG/1
60 CAPSULE, DELAYED RELEASE ORAL DAILY
Qty: 30 CAPSULE | Refills: 5 | Status: SHIPPED | OUTPATIENT
Start: 2024-07-15

## 2024-08-07 DIAGNOSIS — F41.1 GENERALIZED ANXIETY DISORDER: ICD-10-CM

## 2024-08-07 DIAGNOSIS — F33.1 MODERATE EPISODE OF RECURRENT MAJOR DEPRESSIVE DISORDER (HCC): ICD-10-CM

## 2024-08-08 RX ORDER — ALPRAZOLAM 1 MG/1
1 TABLET ORAL 2 TIMES DAILY
Qty: 60 TABLET | Refills: 0 | Status: SHIPPED | OUTPATIENT
Start: 2024-08-08 | End: 2024-09-07

## 2024-08-08 NOTE — TELEPHONE ENCOUNTER
Controlled Substance Monitoring:    Acute and Chronic Pain Monitoring:   RX Monitoring Periodic Controlled Substance Monitoring   8/8/2024  12:46 PM No signs of potential drug abuse or diversion identified.       Requested Prescriptions     Pending Prescriptions Disp Refills    ALPRAZolam (XANAX) 1 MG tablet 60 tablet 0     Sig: Take 1 tablet by mouth 2 times daily for 30 days. Max Daily Amount: 2 mg     I sent the Rx to the pharmacy.     Electronically signed by Caleb Crook MD on 8/8/2024 at 12:47 PM

## 2024-08-23 DIAGNOSIS — K22.70 BARRETT'S ESOPHAGUS WITHOUT DYSPLASIA: ICD-10-CM

## 2024-08-23 DIAGNOSIS — K21.9 GASTROESOPHAGEAL REFLUX DISEASE WITHOUT ESOPHAGITIS: ICD-10-CM

## 2024-08-23 RX ORDER — PROMETHAZINE HYDROCHLORIDE 25 MG/1
25 TABLET ORAL EVERY 8 HOURS PRN
Qty: 40 TABLET | Refills: 0 | Status: SHIPPED | OUTPATIENT
Start: 2024-08-23

## 2024-08-23 NOTE — TELEPHONE ENCOUNTER
Last Appointment:  4/3/2024  Future Appointments   Date Time Provider Department Center   9/24/2024  1:00 PM Caleb Crook MD [facility]

## 2024-09-09 DIAGNOSIS — F33.1 MODERATE EPISODE OF RECURRENT MAJOR DEPRESSIVE DISORDER (HCC): ICD-10-CM

## 2024-09-09 DIAGNOSIS — F41.1 GENERALIZED ANXIETY DISORDER: ICD-10-CM

## 2024-09-09 RX ORDER — ALPRAZOLAM 1 MG
1 TABLET ORAL 2 TIMES DAILY
Qty: 60 TABLET | Refills: 0 | Status: SHIPPED | OUTPATIENT
Start: 2024-09-09 | End: 2024-10-09

## 2024-09-24 ENCOUNTER — TELEMEDICINE (OUTPATIENT)
Dept: FAMILY MEDICINE CLINIC | Age: 44
End: 2024-09-24
Payer: COMMERCIAL

## 2024-09-24 DIAGNOSIS — J30.9 CHRONIC ALLERGIC RHINITIS: ICD-10-CM

## 2024-09-24 DIAGNOSIS — K21.9 GASTROESOPHAGEAL REFLUX DISEASE WITHOUT ESOPHAGITIS: ICD-10-CM

## 2024-09-24 DIAGNOSIS — R00.0 TACHYCARDIA: ICD-10-CM

## 2024-09-24 DIAGNOSIS — Z79.899 LONG TERM CURRENT USE OF THERAPEUTIC DRUG: ICD-10-CM

## 2024-09-24 DIAGNOSIS — M15.9 PRIMARY OSTEOARTHRITIS INVOLVING MULTIPLE JOINTS: ICD-10-CM

## 2024-09-24 DIAGNOSIS — Z12.31 ENCOUNTER FOR SCREENING MAMMOGRAM FOR MALIGNANT NEOPLASM OF BREAST: ICD-10-CM

## 2024-09-24 DIAGNOSIS — K22.4 ESOPHAGEAL SPASM: ICD-10-CM

## 2024-09-24 DIAGNOSIS — E55.9 VITAMIN D DEFICIENCY: ICD-10-CM

## 2024-09-24 DIAGNOSIS — R73.01 IMPAIRED FASTING GLUCOSE: ICD-10-CM

## 2024-09-24 DIAGNOSIS — F33.1 MODERATE EPISODE OF RECURRENT MAJOR DEPRESSIVE DISORDER (HCC): Primary | ICD-10-CM

## 2024-09-24 DIAGNOSIS — F41.1 GENERALIZED ANXIETY DISORDER: ICD-10-CM

## 2024-09-24 DIAGNOSIS — E78.2 MIXED HYPERLIPIDEMIA: ICD-10-CM

## 2024-09-24 DIAGNOSIS — K22.70 BARRETT'S ESOPHAGUS WITHOUT DYSPLASIA: ICD-10-CM

## 2024-09-24 DIAGNOSIS — J06.9 ACUTE UPPER RESPIRATORY INFECTION: ICD-10-CM

## 2024-09-24 DIAGNOSIS — K52.9 COLITIS: ICD-10-CM

## 2024-09-24 DIAGNOSIS — N92.6 IRREGULAR MENSTRUAL CYCLE: ICD-10-CM

## 2024-09-24 DIAGNOSIS — E05.90 HYPERTHYROIDISM: ICD-10-CM

## 2024-09-24 DIAGNOSIS — B02.29 POST HERPETIC NEURALGIA: ICD-10-CM

## 2024-09-24 PROCEDURE — G8417 CALC BMI ABV UP PARAM F/U: HCPCS | Performed by: INTERNAL MEDICINE

## 2024-09-24 PROCEDURE — 99214 OFFICE O/P EST MOD 30 MIN: CPT | Performed by: INTERNAL MEDICINE

## 2024-09-24 PROCEDURE — G8428 CUR MEDS NOT DOCUMENT: HCPCS | Performed by: INTERNAL MEDICINE

## 2024-09-24 PROCEDURE — 1036F TOBACCO NON-USER: CPT | Performed by: INTERNAL MEDICINE

## 2024-09-24 RX ORDER — ATENOLOL 50 MG/1
50 TABLET ORAL 2 TIMES DAILY
Qty: 60 TABLET | Refills: 5 | Status: SHIPPED | OUTPATIENT
Start: 2024-09-24

## 2024-09-24 RX ORDER — MONTELUKAST SODIUM 10 MG/1
10 TABLET ORAL NIGHTLY
Qty: 30 TABLET | Refills: 5 | Status: SHIPPED | OUTPATIENT
Start: 2024-09-24

## 2024-09-24 RX ORDER — GABAPENTIN 600 MG/1
TABLET ORAL
Qty: 90 TABLET | Refills: 5 | Status: SHIPPED | OUTPATIENT
Start: 2024-09-24 | End: 2025-04-03

## 2024-09-24 RX ORDER — LEVONORGESTREL AND ETHINYL ESTRADIOL 100-20(84)
KIT ORAL
Qty: 1 PACKET | Refills: 2 | Status: SHIPPED | OUTPATIENT
Start: 2024-09-24

## 2024-09-24 RX ORDER — OMEPRAZOLE 40 MG/1
40 CAPSULE, DELAYED RELEASE ORAL DAILY
Qty: 30 CAPSULE | Refills: 5 | Status: SHIPPED | OUTPATIENT
Start: 2024-09-24

## 2024-09-24 RX ORDER — PROMETHAZINE HYDROCHLORIDE 25 MG/1
25 TABLET ORAL EVERY 8 HOURS PRN
Qty: 40 TABLET | Refills: 0 | Status: SHIPPED | OUTPATIENT
Start: 2024-09-24

## 2024-09-24 ASSESSMENT — ENCOUNTER SYMPTOMS
SORE THROAT: 1
RHINORRHEA: 1
CONSTIPATION: 0
EYE PAIN: 0
VOMITING: 1
NAUSEA: 1
COUGH: 0
CHEST TIGHTNESS: 0
SHORTNESS OF BREATH: 0
BLOOD IN STOOL: 0
DIARRHEA: 1
ABDOMINAL PAIN: 0

## 2024-10-16 DIAGNOSIS — F41.1 GENERALIZED ANXIETY DISORDER: ICD-10-CM

## 2024-10-16 DIAGNOSIS — F33.1 MODERATE EPISODE OF RECURRENT MAJOR DEPRESSIVE DISORDER (HCC): ICD-10-CM

## 2024-10-16 RX ORDER — ALPRAZOLAM 1 MG
1 TABLET ORAL 2 TIMES DAILY
Qty: 60 TABLET | Refills: 0 | Status: SHIPPED | OUTPATIENT
Start: 2024-10-16 | End: 2024-11-15

## 2024-10-16 NOTE — TELEPHONE ENCOUNTER
Controlled Substance Monitoring:    Acute and Chronic Pain Monitoring:   RX Monitoring Periodic Controlled Substance Monitoring   10/16/2024   8:46 AM No signs of potential drug abuse or diversion identified.       Requested Prescriptions     Pending Prescriptions Disp Refills    ALPRAZolam (XANAX) 1 MG tablet 60 tablet 0     Sig: Take 1 tablet by mouth 2 times daily for 30 days. Max Daily Amount: 2 mg     I sent the Rx to the pharmacy.    Electronically signed by Caleb Crook MD on 10/16/2024 at 8:46 AM

## 2024-11-04 DIAGNOSIS — E78.2 MIXED HYPERLIPIDEMIA: ICD-10-CM

## 2024-11-04 DIAGNOSIS — R73.01 IMPAIRED FASTING GLUCOSE: ICD-10-CM

## 2024-11-04 DIAGNOSIS — E05.90 HYPERTHYROIDISM: ICD-10-CM

## 2024-11-04 DIAGNOSIS — Z79.899 LONG TERM CURRENT USE OF THERAPEUTIC DRUG: ICD-10-CM

## 2024-11-04 DIAGNOSIS — E55.9 VITAMIN D DEFICIENCY: ICD-10-CM

## 2024-11-04 LAB
ALBUMIN: 3.9 G/DL (ref 3.5–5.2)
ALP BLD-CCNC: 63 U/L (ref 35–104)
ALT SERPL-CCNC: 15 U/L (ref 0–32)
ANION GAP SERPL CALCULATED.3IONS-SCNC: 18 MMOL/L (ref 7–16)
AST SERPL-CCNC: 17 U/L (ref 0–31)
BASOPHILS ABSOLUTE: 0.05 K/UL (ref 0–0.2)
BASOPHILS RELATIVE PERCENT: 1 % (ref 0–2)
BILIRUB SERPL-MCNC: 0.2 MG/DL (ref 0–1.2)
BUN BLDV-MCNC: 7 MG/DL (ref 6–20)
CALCIUM SERPL-MCNC: 9.1 MG/DL (ref 8.6–10.2)
CHLORIDE BLD-SCNC: 105 MMOL/L (ref 98–107)
CHOLESTEROL, FASTING: 205 MG/DL
CO2: 18 MMOL/L (ref 22–29)
CREAT SERPL-MCNC: 1 MG/DL (ref 0.5–1)
EOSINOPHILS ABSOLUTE: 0.2 K/UL (ref 0.05–0.5)
EOSINOPHILS RELATIVE PERCENT: 2 % (ref 0–6)
GFR, ESTIMATED: 75 ML/MIN/1.73M2
GLUCOSE BLD-MCNC: 106 MG/DL (ref 74–99)
HBA1C MFR BLD: 5.4 % (ref 4–5.6)
HCT VFR BLD CALC: 39.9 % (ref 34–48)
HDLC SERPL-MCNC: 39 MG/DL
HEMOGLOBIN: 12.6 G/DL (ref 11.5–15.5)
IMMATURE GRANULOCYTES %: 0 % (ref 0–5)
IMMATURE GRANULOCYTES ABSOLUTE: 0.03 K/UL (ref 0–0.58)
LDL CHOLESTEROL: 119 MG/DL
LYMPHOCYTES ABSOLUTE: 3.27 K/UL (ref 1.5–4)
LYMPHOCYTES RELATIVE PERCENT: 31 % (ref 20–42)
MAGNESIUM: 2.6 MG/DL (ref 1.6–2.6)
MCH RBC QN AUTO: 33.5 PG (ref 26–35)
MCHC RBC AUTO-ENTMCNC: 31.6 G/DL (ref 32–34.5)
MCV RBC AUTO: 106.1 FL (ref 80–99.9)
MONOCYTES ABSOLUTE: 0.57 K/UL (ref 0.1–0.95)
MONOCYTES RELATIVE PERCENT: 5 % (ref 2–12)
NEUTROPHILS ABSOLUTE: 6.56 K/UL (ref 1.8–7.3)
NEUTROPHILS RELATIVE PERCENT: 61 % (ref 43–80)
PDW BLD-RTO: 12.5 % (ref 11.5–15)
PLATELET # BLD: 457 K/UL (ref 130–450)
PMV BLD AUTO: 11 FL (ref 7–12)
POTASSIUM SERPL-SCNC: 4.5 MMOL/L (ref 3.5–5)
RBC # BLD: 3.76 M/UL (ref 3.5–5.5)
SODIUM BLD-SCNC: 141 MMOL/L (ref 132–146)
T4 FREE: 1.3 NG/DL (ref 0.9–1.7)
TOTAL PROTEIN: 7.2 G/DL (ref 6.4–8.3)
TRIGLYCERIDE, FASTING: 235 MG/DL
TSH SERPL DL<=0.05 MIU/L-ACNC: 0.72 UIU/ML (ref 0.27–4.2)
VITAMIN D 25-HYDROXY: 63.2 NG/ML (ref 30–100)
VLDLC SERPL CALC-MCNC: 47 MG/DL
WBC # BLD: 10.7 K/UL (ref 4.5–11.5)

## 2024-11-05 ENCOUNTER — TELEPHONE (OUTPATIENT)
Dept: FAMILY MEDICINE CLINIC | Age: 44
End: 2024-11-05

## 2024-11-05 LAB
FOLATE: 6.4 NG/ML (ref 4.8–24.2)
VITAMIN B-12: 726 PG/ML (ref 211–946)

## 2024-11-05 NOTE — TELEPHONE ENCOUNTER
Please let the patient know that blood work results showed    Cholesterol levels were again elevated and triglyceride levels were again elevated.     Bicarbonate level was still borderline decreased similar when compared to previous    Sugar was elevated.  Hemoglobin A1c is a measure 3-month sugar control was normal at 5.4 no evidence for prediabetes or diabetes based on results    Thyroid function test including precursor TSH and active thyroid hormone T4 were normal    Vitamin D level was back in normal range and improved and may wish to consider stopping weekly supplement and switching to over-the-counter daily supplement of 2000 units of vitamin D D3    Vitamin B12 and folic acid levels were normal    Blood counts showed the white blood cell count to be improved and back in normal range, platelet count was borderline increased of uncertain cause, red blood cell size was again borderline enlarged of uncertain cause    Other electrolytes including magnesium, liver functions, and kidney function values were normal    Thanks

## 2024-11-06 RX ORDER — ACETAMINOPHEN 160 MG
2000 TABLET,DISINTEGRATING ORAL DAILY
COMMUNITY

## 2024-11-06 NOTE — TELEPHONE ENCOUNTER
Patient returned the office's call and was informed of results. She is out of Vitamin D 50,000 and will restart OTC 2000.     Information sent through [employer] on how to improve lipid profile.

## 2024-11-12 DIAGNOSIS — J30.9 CHRONIC ALLERGIC RHINITIS: ICD-10-CM

## 2024-11-12 DIAGNOSIS — K21.9 GASTROESOPHAGEAL REFLUX DISEASE WITHOUT ESOPHAGITIS: ICD-10-CM

## 2024-11-12 DIAGNOSIS — K22.70 BARRETT'S ESOPHAGUS WITHOUT DYSPLASIA: ICD-10-CM

## 2024-11-13 RX ORDER — PROMETHAZINE HYDROCHLORIDE 25 MG/1
25 TABLET ORAL EVERY 8 HOURS PRN
Qty: 40 TABLET | Refills: 0 | Status: SHIPPED | OUTPATIENT
Start: 2024-11-13

## 2024-11-13 NOTE — TELEPHONE ENCOUNTER
Last Appointment:  9/24/2024  Future Appointments   Date Time Provider Department Center   12/19/2024  3:00 PM Caleb Crook MD [facility]   12/23/2024  1:00 PM Valentina Pozo, APRN - [facility]

## 2024-11-14 RX ORDER — LORATADINE 10 MG/1
10 TABLET ORAL DAILY PRN
Qty: 30 TABLET | Refills: 5 | Status: SHIPPED | OUTPATIENT
Start: 2024-11-14

## 2024-11-14 RX ORDER — CYCLOBENZAPRINE HCL 5 MG
5 TABLET ORAL 3 TIMES DAILY PRN
Qty: 60 TABLET | Refills: 5 | Status: SHIPPED | OUTPATIENT
Start: 2024-11-14

## 2024-11-26 DIAGNOSIS — F41.1 GENERALIZED ANXIETY DISORDER: ICD-10-CM

## 2024-11-26 DIAGNOSIS — F33.1 MODERATE EPISODE OF RECURRENT MAJOR DEPRESSIVE DISORDER (HCC): ICD-10-CM

## 2024-11-26 RX ORDER — ALPRAZOLAM 1 MG/1
1 TABLET ORAL 2 TIMES DAILY
Qty: 60 TABLET | Refills: 0 | Status: SHIPPED | OUTPATIENT
Start: 2024-11-26 | End: 2024-12-26

## 2024-11-26 NOTE — TELEPHONE ENCOUNTER
Controlled Substance Monitoring:    Acute and Chronic Pain Monitoring:   RX Monitoring Periodic Controlled Substance Monitoring   11/26/2024   2:18 PM No signs of potential drug abuse or diversion identified.       Requested Prescriptions     Pending Prescriptions Disp Refills    ALPRAZolam (XANAX) 1 MG tablet 60 tablet 0     Sig: Take 1 tablet by mouth 2 times daily for 30 days. Max Daily Amount: 2 mg     I sent the Rx to the pharmacy.    Electronically signed by Caleb Crook MD on 11/26/2024 at 2:19 PM

## 2024-11-26 NOTE — TELEPHONE ENCOUNTER
Last Appointment:  9/24/2024  Future Appointments   Date Time Provider Department Center   12/19/2024  3:00 PM Caleb Crook MD [facility]   12/23/2024  1:00 PM Valentina Pozo, APRN - CNM [facility]

## 2024-12-09 DIAGNOSIS — K22.70 BARRETT'S ESOPHAGUS WITHOUT DYSPLASIA: ICD-10-CM

## 2024-12-09 DIAGNOSIS — K21.9 GASTROESOPHAGEAL REFLUX DISEASE WITHOUT ESOPHAGITIS: ICD-10-CM

## 2024-12-09 DIAGNOSIS — K22.4 ESOPHAGEAL SPASM: ICD-10-CM

## 2024-12-10 RX ORDER — OMEPRAZOLE 40 MG/1
40 CAPSULE, DELAYED RELEASE ORAL DAILY
Qty: 30 CAPSULE | Refills: 5 | Status: SHIPPED | OUTPATIENT
Start: 2024-12-10

## 2024-12-10 NOTE — TELEPHONE ENCOUNTER
Last Appointment:  9/24/2024  Future Appointments   Date Time Provider Department Center   12/23/2024  1:00 PM Valentina Pozo APRN - [facility]   1/16/2025  1:15 PM Caleb Crook MD [facility]

## 2024-12-27 DIAGNOSIS — K21.9 GASTROESOPHAGEAL REFLUX DISEASE WITHOUT ESOPHAGITIS: ICD-10-CM

## 2024-12-27 DIAGNOSIS — F41.1 GENERALIZED ANXIETY DISORDER: ICD-10-CM

## 2024-12-27 DIAGNOSIS — F33.1 MODERATE EPISODE OF RECURRENT MAJOR DEPRESSIVE DISORDER (HCC): ICD-10-CM

## 2024-12-27 DIAGNOSIS — K22.70 BARRETT'S ESOPHAGUS WITHOUT DYSPLASIA: ICD-10-CM

## 2024-12-27 RX ORDER — ALPRAZOLAM 1 MG/1
1 TABLET ORAL 2 TIMES DAILY
Qty: 60 TABLET | Refills: 0 | Status: SHIPPED | OUTPATIENT
Start: 2024-12-27 | End: 2025-01-26

## 2024-12-27 RX ORDER — PROMETHAZINE HYDROCHLORIDE 25 MG/1
25 TABLET ORAL EVERY 8 HOURS PRN
Qty: 40 TABLET | Refills: 0 | Status: SHIPPED | OUTPATIENT
Start: 2024-12-27

## 2024-12-27 NOTE — TELEPHONE ENCOUNTER
Controlled Substance Monitoring:    Acute and Chronic Pain Monitoring:   RX Monitoring Periodic Controlled Substance Monitoring   12/27/2024  12:27 PM No signs of potential drug abuse or diversion identified.       Requested Prescriptions     Pending Prescriptions Disp Refills    promethazine (PHENERGAN) 25 MG tablet 40 tablet 0     Sig: Take 1 tablet by mouth every 8 hours as needed for Nausea    ALPRAZolam (XANAX) 1 MG tablet 60 tablet 0     Sig: Take 1 tablet by mouth 2 times daily for 30 days. Max Daily Amount: 2 mg     I sent the Rx to the pharmacy.    Electronically signed by Caleb Crook MD on 12/27/2024 at 12:29 PM

## 2024-12-27 NOTE — TELEPHONE ENCOUNTER
Name of Medication(s) Requested:  Requested Prescriptions     Pending Prescriptions Disp Refills    promethazine (PHENERGAN) 25 MG tablet 40 tablet 0     Sig: Take 1 tablet by mouth every 8 hours as needed for Nausea    ALPRAZolam (XANAX) 1 MG tablet 60 tablet 0     Sig: Take 1 tablet by mouth 2 times daily for 30 days. Max Daily Amount: 2 mg       Medication is on current medication list Yes    Dosage and directions were verified? Yes    Quantity verified: 90 day supply     Pharmacy Verified?  Yes    Last Appointment:  9/24/2024    Future appts:  Future Appointments   Date Time Provider Department Center   1/16/2025  1:15 PM Caleb Crook MD [facility]        (If no appt send self scheduling link. .REFILLAPPT)  Scheduling request sent?     [] Yes  [x] No    Does patient need updated?  [] Yes  [x] No

## 2025-01-27 DIAGNOSIS — F41.1 GENERALIZED ANXIETY DISORDER: ICD-10-CM

## 2025-01-27 DIAGNOSIS — F33.1 MODERATE EPISODE OF RECURRENT MAJOR DEPRESSIVE DISORDER (HCC): ICD-10-CM

## 2025-01-27 RX ORDER — ALPRAZOLAM 1 MG/1
1 TABLET ORAL 2 TIMES DAILY
Qty: 60 TABLET | Refills: 2 | Status: SHIPPED | OUTPATIENT
Start: 2025-01-29 | End: 2025-04-29

## 2025-01-27 NOTE — TELEPHONE ENCOUNTER
Last Appointment:  9/24/2024  Future Appointments   Date Time Provider Department Center   2/25/2025  1:30 PM Caleb Crook MD [facility]

## 2025-02-17 ENCOUNTER — TELEPHONE (OUTPATIENT)
Dept: FAMILY MEDICINE CLINIC | Age: 45
End: 2025-02-17

## 2025-02-17 NOTE — TELEPHONE ENCOUNTER
[facility] office called to request our fax number. They are faxing a records request to the office.